# Patient Record
Sex: MALE | Race: WHITE | NOT HISPANIC OR LATINO | Employment: FULL TIME | URBAN - NONMETROPOLITAN AREA
[De-identification: names, ages, dates, MRNs, and addresses within clinical notes are randomized per-mention and may not be internally consistent; named-entity substitution may affect disease eponyms.]

---

## 2019-01-15 ENCOUNTER — OFFICE VISIT (OUTPATIENT)
Dept: RETAIL CLINIC | Facility: CLINIC | Age: 34
End: 2019-01-15

## 2019-01-15 VITALS
SYSTOLIC BLOOD PRESSURE: 123 MMHG | DIASTOLIC BLOOD PRESSURE: 83 MMHG | RESPIRATION RATE: 20 BRPM | WEIGHT: 307 LBS | BODY MASS INDEX: 40.69 KG/M2 | HEIGHT: 73 IN | TEMPERATURE: 98 F | HEART RATE: 90 BPM | OXYGEN SATURATION: 97 %

## 2019-01-15 DIAGNOSIS — R11.0 NAUSEA: Primary | ICD-10-CM

## 2019-01-15 DIAGNOSIS — R19.7 ACUTE DIARRHEA: ICD-10-CM

## 2019-01-15 PROBLEM — E11.9 TYPE 2 DIABETES MELLITUS WITHOUT COMPLICATION, WITHOUT LONG-TERM CURRENT USE OF INSULIN: Status: ACTIVE | Noted: 2019-01-15

## 2019-01-15 PROBLEM — I10 ESSENTIAL HYPERTENSION: Status: ACTIVE | Noted: 2019-01-15

## 2019-01-15 PROCEDURE — 99203 OFFICE O/P NEW LOW 30 MIN: CPT | Performed by: NURSE PRACTITIONER

## 2019-01-15 RX ORDER — HYDROCHLOROTHIAZIDE 25 MG/1
25 TABLET ORAL DAILY
COMMUNITY
End: 2019-02-21 | Stop reason: SDUPTHER

## 2019-01-15 RX ORDER — ONDANSETRON 8 MG/1
8 TABLET, ORALLY DISINTEGRATING ORAL EVERY 8 HOURS PRN
Qty: 12 TABLET | Refills: 0 | Status: SHIPPED | OUTPATIENT
Start: 2019-01-15 | End: 2019-07-18

## 2019-01-15 RX ORDER — AMLODIPINE BESYLATE 5 MG/1
5 TABLET ORAL DAILY
COMMUNITY
End: 2019-03-21 | Stop reason: DRUGHIGH

## 2019-01-15 RX ORDER — LOSARTAN POTASSIUM 50 MG/1
50 TABLET ORAL DAILY
COMMUNITY
End: 2019-02-21 | Stop reason: SDUPTHER

## 2019-01-15 NOTE — PATIENT INSTRUCTIONS
Diarrhea, Adult  Diarrhea is frequent loose and watery bowel movements. Diarrhea can make you feel weak and cause you to become dehydrated. Dehydration can make you tired and thirsty, cause you to have a dry mouth, and decrease how often you urinate. Diarrhea typically lasts 2-3 days. However, it can last longer if it is a sign of something more serious. It is important to treat your diarrhea as told by your health care provider.  Follow these instructions at home:  Eating and drinking    Follow these recommendations as told by your health care provider:  · Take an oral rehydration solution (ORS). This is a drink that is sold at pharmacies and retail stores.  · Drink clear fluids, such as water, ice chips, diluted fruit juice, and low-calorie sports drinks.  · Eat bland, easy-to-digest foods in small amounts as you are able. These foods include bananas, applesauce, rice, lean meats, toast, and crackers.  · Avoid drinking fluids that contain a lot of sugar or caffeine, such as energy drinks, sports drinks, and soda.  · Avoid alcohol.  · Avoid spicy or fatty foods.    General instructions  · Drink enough fluid to keep your urine clear or pale yellow.  · Wash your hands often. If soap and water are not available, use hand .  · Make sure that all people in your household wash their hands well and often.  · Take over-the-counter and prescription medicines only as told by your health care provider.  · Rest at home while you recover.  · Watch your condition for any changes.  · Take a warm bath to relieve any burning or pain from frequent diarrhea episodes.  · Keep all follow-up visits as told by your health care provider. This is important.  Contact a health care provider if:  · You have a fever.  · Your diarrhea gets worse.  · You have new symptoms.  · You cannot keep fluids down.  · You feel light-headed or dizzy.  · You have a headache  · You have muscle cramps.  Get help right away if:  · You have chest  pain.  · You feel extremely weak or you faint.  · You have bloody or black stools or stools that look like tar.  · You have severe pain, cramping, or bloating in your abdomen.  · You have trouble breathing or you are breathing very quickly.  · Your heart is beating very quickly.  · Your skin feels cold and clammy.  · You feel confused.  · You have signs of dehydration, such as:  ? Dark urine, very little urine, or no urine.  ? Cracked lips.  ? Dry mouth.  ? Sunken eyes.  ? Sleepiness.  ? Weakness.  This information is not intended to replace advice given to you by your health care provider. Make sure you discuss any questions you have with your health care provider.  Document Released: 12/08/2003 Document Revised: 04/27/2017 Document Reviewed: 08/23/2016  [employer] Patient Education © 2018 [employer]  Nausea, Adult  Nausea is the feeling of an upset stomach or having to vomit. Nausea on its own is not usually a serious concern, but it may be an early sign of a more serious medical problem. As nausea gets worse, it can lead to vomiting. If vomiting develops, or if you are not able to drink enough fluids, you are at risk of becoming dehydrated. Dehydration can make you tired and thirsty, cause you to have a dry mouth, and decrease how often you urinate. Older adults and people with other diseases or a weak immune system are at higher risk for dehydration. The main goals of treating your nausea are:  · To limit repeated nausea episodes.  · To prevent vomiting and dehydration.    Follow these instructions at home:  Follow instructions from your health care provider about how to care for yourself at home.  Eating and drinking  Follow these recommendations as told by your health care provider:  · Take an oral rehydration solution (ORS). This is a drink that is sold at pharmacies and retail stores.  · Drink clear fluids in small amounts as you are able. Clear fluids include water, ice chips, diluted fruit  juice, and low-calorie sports drinks.  · Eat bland, easy-to-digest foods in small amounts as you are able. These foods include bananas, applesauce, rice, lean meats, toast, and crackers.  · Avoid drinking fluids that contain a lot of sugar or caffeine, such as energy drinks, sports drinks, and soda.  · Avoid alcohol.  · Avoid spicy or fatty foods.    General instructions  · Drink enough fluid to keep your urine clear or pale yellow.  · Wash your hands often. If soap and water are not available, use hand .  · Make sure that all people in your household wash their hands well and often.  · Rest at home while you recover.  · Take over-the-counter and prescription medicines only as told by your health care provider.  · Breathe slowly and deeply when you feel nauseous.  · Watch your condition for any changes.  · Keep all follow-up visits as told by your health care provider. This is important.  Contact a health care provider if:  · You have a headache.  · You have new symptoms.  · Your nausea gets worse.  · You have a fever.  · You feel light-headed or dizzy.  · You vomit.  · You cannot keep fluids down.  Get help right away if:  · You have pain in your chest, neck, arm, or jaw.  · You feel extremely weak or you faint.  · You have vomit that is bright red or looks like coffee grounds.  · You have bloody or black stools or stools that look like tar.  · You have a severe headache, a stiff neck, or both.  · You have severe pain, cramping, or bloating in your abdomen.  · You have a rash.  · You have difficulty breathing or are breathing very quickly.  · Your heart is beating very quickly.  · Your skin feels cold and clammy.  · You feel confused.  · You have pain when you urinate.  · You have signs of dehydration, such as:  ? Dark urine, very little, or no urine.  ? Cracked lips.  ? Dry mouth.  ? Sunken eyes.  ? Sleepiness.  ? Weakness.  These symptoms may represent a serious problem that is an emergency. Do not wait  to see if the symptoms will go away. Get medical help right away. Call your local emergency services (911 in the [place] Do not drive yourself to the hospital.  This information is not intended to replace advice given to you by your health care provider. Make sure you discuss any questions you have with your health care provider.  Document Released: 01/25/2006 Document Revised: 05/22/2017 Document Reviewed: 08/23/2016  [employer] Interactive Patient Education © 2018 [employer]

## 2019-02-15 ENCOUNTER — OFFICE VISIT (OUTPATIENT)
Dept: INTERNAL MEDICINE | Facility: CLINIC | Age: 34
End: 2019-02-15

## 2019-02-15 VITALS
WEIGHT: 306.44 LBS | BODY MASS INDEX: 40.61 KG/M2 | OXYGEN SATURATION: 98 % | SYSTOLIC BLOOD PRESSURE: 144 MMHG | DIASTOLIC BLOOD PRESSURE: 92 MMHG | HEIGHT: 73 IN | RESPIRATION RATE: 12 BRPM | TEMPERATURE: 98.4 F | HEART RATE: 90 BPM

## 2019-02-15 DIAGNOSIS — K21.9 GASTROESOPHAGEAL REFLUX DISEASE, ESOPHAGITIS PRESENCE NOT SPECIFIED: ICD-10-CM

## 2019-02-15 DIAGNOSIS — I10 ESSENTIAL HYPERTENSION: ICD-10-CM

## 2019-02-15 DIAGNOSIS — E11.9 TYPE 2 DIABETES MELLITUS WITHOUT COMPLICATION, WITHOUT LONG-TERM CURRENT USE OF INSULIN (HCC): Primary | ICD-10-CM

## 2019-02-15 PROCEDURE — 99204 OFFICE O/P NEW MOD 45 MIN: CPT | Performed by: FAMILY MEDICINE

## 2019-02-15 RX ORDER — METFORMIN HYDROCHLORIDE 500 MG/1
1 TABLET, EXTENDED RELEASE ORAL 2 TIMES DAILY
COMMUNITY
Start: 2019-01-16 | End: 2019-03-21 | Stop reason: SDUPTHER

## 2019-02-15 NOTE — PATIENT INSTRUCTIONS
Check milligram doses on bottles before you needs refills.     Please get labs done soon.     Goal blood pressure less than 140/90, if that is not happening see us back for medication adjustment.     See us back in 3 months, sooner if labs or blood pressure is abnormal.

## 2019-02-15 NOTE — PROGRESS NOTES
"CC:   Chief Complaint   Patient presents with   • Establish Care   • Med Refill       History:  Gagandeep Ordoñez is a 33 y.o. male who presents today for evaluation of the above problems.      Constant muscle cramps that wake him up at night, on feet all day working at [employer] center. Works night shift. Present for several months, maybe a year. Last episode last night, prior to that 2 weeks ago.     HTN: on amlodipine, unsure what dose, as well as HCTZ and losartan. He does not know the dosage of any of them. BP normally runs 120-130/80s.     DMII: has not checked his blood sugars recently. Cannot remember last A1c. Says that he has never been a diabetic, just \"gave him medication.\" Saw dietician once, tries to avoid breads, no sodas, drinks mostly water.     Gets acid reflux on occasions with spicy foods.     Wife thinks he has sleep apnea due to fatigue.     ROS:  Review of Systems   Constitutional: Positive for fatigue (12 hr days). Negative for chills and fever.   HENT: Negative.    Eyes: Negative.    Respiratory: Negative.    Cardiovascular: Negative.    Gastrointestinal: Negative.    Endocrine: Positive for polydipsia.   Genitourinary: Negative.    Musculoskeletal: Positive for myalgias.   Allergic/Immunologic: Negative.    Neurological: Negative.    Hematological: Negative.    Psychiatric/Behavioral: Positive for sleep disturbance.       No Known Allergies  Past Medical History:   Diagnosis Date   • Acid reflux    • Hypertension    • Type 2 diabetes mellitus (CMS/HCC)      Past Surgical History:   Procedure Laterality Date   • HAND SURGERY     • HERNIA REPAIR       Family History   Problem Relation Age of Onset   • Diabetes Mother    • Diabetes Father    • Stroke Father       reports that  has never smoked. He has quit using smokeless tobacco. He reports that he drinks alcohol. He reports that he does not use drugs.      Current Outpatient Medications:   •  amLODIPine (NORVASC) 5 MG tablet, Take 5 mg by mouth " "Daily., Disp: , Rfl:   •  hydrochlorothiazide (HYDRODIURIL) 25 MG tablet, Take 25 mg by mouth Daily., Disp: , Rfl:   •  losartan (COZAAR) 50 MG tablet, Take 50 mg by mouth Daily., Disp: , Rfl:   •  metFORMIN ER (GLUCOPHAGE-XR) 500 MG 24 hr tablet, Take 1 tablet by mouth 2 (Two) Times a Day., Disp: , Rfl:   •  ondansetron ODT (ZOFRAN ODT) 8 MG disintegrating tablet, Take 1 tablet by mouth Every 8 (Eight) Hours As Needed for Nausea or Vomiting., Disp: 12 tablet, Rfl: 0    OBJECTIVE:  /92 (BP Location: Left arm, Patient Position: Sitting, Cuff Size: Adult)   Pulse 90   Temp 98.4 °F (36.9 °C) (Temporal)   Resp 12   Ht 185.4 cm (73\")   Wt (!) 139 kg (306 lb 7 oz)   SpO2 98%   BMI 40.43 kg/m²      Physical Exam   Constitutional: He is oriented to person, place, and time. No distress.   Morbidly obese   HENT:   Head: Normocephalic and atraumatic.   Nose: Nose normal.   Eyes: Conjunctivae are normal. Right eye exhibits no discharge. Left eye exhibits no discharge. No scleral icterus.   Neck: No tracheal deviation present.   Cardiovascular: Normal rate, regular rhythm and normal heart sounds. Exam reveals no gallop and no friction rub.   No murmur heard.  Pulmonary/Chest: Effort normal and breath sounds normal. No respiratory distress. He has no wheezes. He has no rales.   Musculoskeletal: He exhibits no edema.   Neurological: He is alert and oriented to person, place, and time.   Skin: Skin is warm and dry. He is not diaphoretic. No pallor.   Psychiatric: He has a normal mood and affect. His behavior is normal. Judgment and thought content normal.   Nursing note and vitals reviewed.      Assessment/Plan    Problem List Items Addressed This Visit        Cardiovascular and Mediastinum    Essential hypertension     Elevated today  -home BP measurements  -clarify home doses of medications prior to refill            Digestive    Acid reflux     PRN OTC antacid            Endocrine    Type 2 diabetes mellitus without " complication, without long-term current use of insulin (CMS/[facility]) - Primary     Check A1c and CMP today, gave Rx for pt to check blood sugars. F/u 3 months, sooner if A1c elevated for additional therapy         Relevant Medications    metFORMIN ER (GLUCOPHAGE-XR) 500 MG 24 hr tablet    Other Relevant Orders    Lipid panel    Comprehensive Metabolic Panel    Hemoglobin A1c    TSH          Patient's Body mass index is 40.43 kg/m². BMI is above normal parameters. Recommendations include: nutrition counseling.      An After Visit Summary was printed and given to the patient at discharge.  Return in about 3 months (around 5/15/2019).         Mynor Walker D.O.  Family Medicine  Osteopathic Neuromusculoskeletal Medicine  2/17/2019

## 2019-02-18 NOTE — ASSESSMENT & PLAN NOTE
Check A1c and CMP today, gave Rx for pt to check blood sugars. F/u 3 months, sooner if A1c elevated for additional therapy

## 2019-02-20 ENCOUNTER — APPOINTMENT (OUTPATIENT)
Dept: CT IMAGING | Age: 34
End: 2019-02-20
Payer: COMMERCIAL

## 2019-02-20 ENCOUNTER — HOSPITAL ENCOUNTER (EMERGENCY)
Age: 34
Discharge: HOME | End: 2019-02-20
Payer: COMMERCIAL

## 2019-02-20 VITALS
HEART RATE: 84 BPM | HEIGHT: 72 IN | TEMPERATURE: 97.8 F | WEIGHT: 306 LBS | OXYGEN SATURATION: 93 % | RESPIRATION RATE: 16 BRPM | SYSTOLIC BLOOD PRESSURE: 118 MMHG | BODY MASS INDEX: 41.45 KG/M2 | DIASTOLIC BLOOD PRESSURE: 64 MMHG

## 2019-02-20 DIAGNOSIS — I16.0 HYPERTENSIVE URGENCY: Primary | ICD-10-CM

## 2019-02-20 LAB
ALBUMIN SERPL-MCNC: 4.5 G/DL (ref 3.5–5.2)
ALP BLD-CCNC: 90 U/L (ref 40–130)
ALT SERPL-CCNC: 44 U/L (ref 5–41)
ANION GAP SERPL CALCULATED.3IONS-SCNC: 14 MMOL/L (ref 7–19)
AST SERPL-CCNC: 25 U/L (ref 5–40)
BASOPHILS ABSOLUTE: 0.1 K/UL (ref 0–0.2)
BASOPHILS RELATIVE PERCENT: 0.7 % (ref 0–1)
BILIRUB SERPL-MCNC: 0.4 MG/DL (ref 0.2–1.2)
BUN BLDV-MCNC: 18 MG/DL (ref 6–20)
CALCIUM SERPL-MCNC: 9.5 MG/DL (ref 8.6–10)
CHLORIDE BLD-SCNC: 97 MMOL/L (ref 98–111)
CO2: 27 MMOL/L (ref 22–29)
CREAT SERPL-MCNC: 0.8 MG/DL (ref 0.5–1.2)
EOSINOPHILS ABSOLUTE: 0.1 K/UL (ref 0–0.6)
EOSINOPHILS RELATIVE PERCENT: 1.4 % (ref 0–5)
GFR NON-AFRICAN AMERICAN: >60
GLUCOSE BLD-MCNC: 142 MG/DL (ref 74–109)
HCT VFR BLD CALC: 45.6 % (ref 42–52)
HEMOGLOBIN: 15.1 G/DL (ref 14–18)
LYMPHOCYTES ABSOLUTE: 3.4 K/UL (ref 1.1–4.5)
LYMPHOCYTES RELATIVE PERCENT: 33.2 % (ref 20–40)
MCH RBC QN AUTO: 28.8 PG (ref 27–31)
MCHC RBC AUTO-ENTMCNC: 33.1 G/DL (ref 33–37)
MCV RBC AUTO: 86.9 FL (ref 80–94)
MONOCYTES ABSOLUTE: 0.8 K/UL (ref 0–0.9)
MONOCYTES RELATIVE PERCENT: 7.9 % (ref 0–10)
NEUTROPHILS ABSOLUTE: 5.8 K/UL (ref 1.5–7.5)
NEUTROPHILS RELATIVE PERCENT: 56.4 % (ref 50–65)
PDW BLD-RTO: 12.9 % (ref 11.5–14.5)
PLATELET # BLD: 292 K/UL (ref 130–400)
PMV BLD AUTO: 11.2 FL (ref 9.4–12.4)
POTASSIUM SERPL-SCNC: 4.2 MMOL/L (ref 3.5–5)
RBC # BLD: 5.25 M/UL (ref 4.7–6.1)
SODIUM BLD-SCNC: 138 MMOL/L (ref 136–145)
TOTAL PROTEIN: 7.1 G/DL (ref 6.6–8.7)
TROPONIN: <0.01 NG/ML (ref 0–0.03)
WBC # BLD: 10.3 K/UL (ref 4.8–10.8)

## 2019-02-20 PROCEDURE — 84484 ASSAY OF TROPONIN QUANT: CPT

## 2019-02-20 PROCEDURE — 93005 ELECTROCARDIOGRAM TRACING: CPT

## 2019-02-20 PROCEDURE — 99284 EMERGENCY DEPT VISIT MOD MDM: CPT

## 2019-02-20 PROCEDURE — 36415 COLL VENOUS BLD VENIPUNCTURE: CPT

## 2019-02-20 PROCEDURE — 70450 CT HEAD/BRAIN W/O DYE: CPT

## 2019-02-20 PROCEDURE — 99284 EMERGENCY DEPT VISIT MOD MDM: CPT | Performed by: NURSE PRACTITIONER

## 2019-02-20 PROCEDURE — 80053 COMPREHEN METABOLIC PANEL: CPT

## 2019-02-20 PROCEDURE — 85025 COMPLETE CBC W/AUTO DIFF WBC: CPT

## 2019-02-20 RX ORDER — AMLODIPINE BESYLATE 5 MG/1
5 TABLET ORAL DAILY
COMMUNITY

## 2019-02-20 RX ORDER — LOSARTAN POTASSIUM 50 MG/1
50 TABLET ORAL DAILY
COMMUNITY

## 2019-02-20 RX ORDER — HYDROCHLOROTHIAZIDE 25 MG/1
25 TABLET ORAL DAILY
COMMUNITY

## 2019-02-20 ASSESSMENT — ENCOUNTER SYMPTOMS
RESPIRATORY NEGATIVE: 1
NAUSEA: 1
DIARRHEA: 1

## 2019-02-21 ENCOUNTER — LAB (OUTPATIENT)
Dept: LAB | Facility: HOSPITAL | Age: 34
End: 2019-02-21

## 2019-02-21 ENCOUNTER — OFFICE VISIT (OUTPATIENT)
Dept: INTERNAL MEDICINE | Facility: CLINIC | Age: 34
End: 2019-02-21

## 2019-02-21 VITALS
HEIGHT: 73 IN | SYSTOLIC BLOOD PRESSURE: 122 MMHG | OXYGEN SATURATION: 98 % | HEART RATE: 84 BPM | WEIGHT: 309.13 LBS | BODY MASS INDEX: 40.97 KG/M2 | DIASTOLIC BLOOD PRESSURE: 80 MMHG | RESPIRATION RATE: 12 BRPM

## 2019-02-21 DIAGNOSIS — I10 ESSENTIAL HYPERTENSION: Primary | ICD-10-CM

## 2019-02-21 DIAGNOSIS — G47.26 SHIFT WORK SLEEP DISORDER: ICD-10-CM

## 2019-02-21 DIAGNOSIS — Z79.4 DIABETES MELLITUS, TYPE II, INSULIN DEPENDENT (HCC): ICD-10-CM

## 2019-02-21 DIAGNOSIS — E11.9 TYPE 2 DIABETES MELLITUS WITHOUT COMPLICATION, WITHOUT LONG-TERM CURRENT USE OF INSULIN (HCC): ICD-10-CM

## 2019-02-21 DIAGNOSIS — E11.9 DIABETES MELLITUS, TYPE II, INSULIN DEPENDENT (HCC): ICD-10-CM

## 2019-02-21 LAB
ALBUMIN SERPL-MCNC: 4.5 G/DL (ref 3.5–5)
ALBUMIN/GLOB SERPL: 1.3 G/DL (ref 1.1–2.5)
ALP SERPL-CCNC: 81 U/L (ref 24–120)
ALT SERPL W P-5'-P-CCNC: 64 U/L (ref 0–54)
ANION GAP SERPL CALCULATED.3IONS-SCNC: 9 MMOL/L (ref 4–13)
AST SERPL-CCNC: 42 U/L (ref 7–45)
BILIRUB SERPL-MCNC: 0.8 MG/DL (ref 0.1–1)
BUN BLD-MCNC: 16 MG/DL (ref 5–21)
BUN/CREAT SERPL: 20 (ref 7–25)
CALCIUM SPEC-SCNC: 9.5 MG/DL (ref 8.4–10.4)
CHLORIDE SERPL-SCNC: 99 MMOL/L (ref 98–110)
CO2 SERPL-SCNC: 30 MMOL/L (ref 24–31)
CREAT BLD-MCNC: 0.8 MG/DL (ref 0.5–1.4)
GFR SERPL CREATININE-BSD FRML MDRD: 110 ML/MIN/1.73
GLOBULIN UR ELPH-MCNC: 3.5 GM/DL
GLUCOSE BLD-MCNC: 125 MG/DL (ref 70–100)
HBA1C MFR BLD: 6.6 %
POTASSIUM BLD-SCNC: 4.3 MMOL/L (ref 3.5–5.3)
PROT SERPL-MCNC: 8 G/DL (ref 6.3–8.7)
SODIUM BLD-SCNC: 138 MMOL/L (ref 135–145)
TSH SERPL DL<=0.05 MIU/L-ACNC: 1.39 MIU/ML (ref 0.47–4.68)

## 2019-02-21 PROCEDURE — 83036 HEMOGLOBIN GLYCOSYLATED A1C: CPT

## 2019-02-21 PROCEDURE — 84443 ASSAY THYROID STIM HORMONE: CPT | Performed by: FAMILY MEDICINE

## 2019-02-21 PROCEDURE — 36415 COLL VENOUS BLD VENIPUNCTURE: CPT

## 2019-02-21 PROCEDURE — 99214 OFFICE O/P EST MOD 30 MIN: CPT | Performed by: FAMILY MEDICINE

## 2019-02-21 PROCEDURE — 80053 COMPREHEN METABOLIC PANEL: CPT | Performed by: FAMILY MEDICINE

## 2019-02-21 RX ORDER — HYDROCHLOROTHIAZIDE 25 MG/1
25 TABLET ORAL DAILY
Qty: 90 TABLET | Refills: 1 | Status: SHIPPED | OUTPATIENT
Start: 2019-02-21 | End: 2019-03-21 | Stop reason: SDUPTHER

## 2019-02-21 RX ORDER — LOSARTAN POTASSIUM 50 MG/1
50 TABLET ORAL DAILY
Qty: 90 TABLET | Refills: 1 | Status: SHIPPED | OUTPATIENT
Start: 2019-02-21 | End: 2019-03-21 | Stop reason: SDUPTHER

## 2019-02-21 RX ORDER — AMLODIPINE BESYLATE 10 MG/1
10 TABLET ORAL DAILY
Qty: 90 TABLET | Refills: 0 | Status: SHIPPED | OUTPATIENT
Start: 2019-02-21 | End: 2019-03-21 | Stop reason: SDUPTHER

## 2019-02-21 NOTE — PATIENT INSTRUCTIONS
Continue losartan and HCTZ  Increase amlodipine to 10 mg daily (new Rx is 10 mg tabs, but you can take two 5 mg tabs until it's out)  Take home blood pressure goal of less than 140/90, use a arm cuff as wrist cuffs are often inaccurate.     Try melatonin to help with sleep before using Tylenol PM

## 2019-02-21 NOTE — ASSESSMENT & PLAN NOTE
Uncontrolled with hypertensive urgency yesterday. BP ok today, but given yesterday's events will increase amlodipine and continue HCTZ and losartan. F/u 4 weeks, home BP monitoring for goal < 140/90.

## 2019-02-21 NOTE — PROGRESS NOTES
CC:   Chief Complaint   Patient presents with   • Follow-up   • Hypertension       History:  Gagandeep Ordoñez is a 33 y.o. male who presents today for follow-up for evaluation of the above:    Had high blood pressure yesterday AM was 182/110 with wrist cuff, took meds and came down to 140s/70s. Says he went to [facility] ED. Had ECG and blood work and CT head (normal) and said that everything was ok. Says he felt numbness in his shoulder and L neck, but says that he pulled a muscle in the arm and it felt similar.     Struggles with staying asleep working at night. Currently not using sleep aid.     ROS:  Review of Systems   Constitutional: Negative for fatigue.   HENT:        Tightness in jaw yesterday with high BP   Respiratory: Negative for shortness of breath.    Cardiovascular: Negative for chest pain.   Gastrointestinal: Negative for nausea.   Genitourinary: Negative.    Neurological: Positive for dizziness (yesterday with high BP). Negative for light-headedness.   Psychiatric/Behavioral: Negative for sleep disturbance.       Mr. Ordoñez  reports that  has never smoked. He has quit using smokeless tobacco. He reports that he drinks alcohol. He reports that he does not use drugs.      Current Outpatient Medications:   •  amLODIPine (NORVASC) 5 MG tablet, Take 5 mg by mouth Daily., Disp: , Rfl:   •  hydrochlorothiazide (HYDRODIURIL) 25 MG tablet, Take 1 tablet by mouth Daily., Disp: 90 tablet, Rfl: 1  •  losartan (COZAAR) 50 MG tablet, Take 1 tablet by mouth Daily., Disp: 90 tablet, Rfl: 1  •  metFORMIN ER (GLUCOPHAGE-XR) 500 MG 24 hr tablet, Take 1 tablet by mouth 2 (Two) Times a Day., Disp: , Rfl:   •  amLODIPine (NORVASC) 10 MG tablet, Take 1 tablet by mouth Daily., Disp: 90 tablet, Rfl: 0  •  ondansetron ODT (ZOFRAN ODT) 8 MG disintegrating tablet, Take 1 tablet by mouth Every 8 (Eight) Hours As Needed for Nausea or Vomiting., Disp: 12 tablet, Rfl: 0      OBJECTIVE:  /80 (BP Location: Left arm, Patient Position:  "Sitting, Cuff Size: Large Adult)   Pulse 84   Resp 12   Ht 185.4 cm (73\")   Wt (!) 140 kg (309 lb 2 oz)   SpO2 98%   BMI 40.78 kg/m²    Physical Exam   Constitutional: He is oriented to person, place, and time. No distress.   Morbid obesity   HENT:   Head: Normocephalic and atraumatic.   Nose: Nose normal.   Eyes: Conjunctivae are normal. Right eye exhibits no discharge. Left eye exhibits no discharge. No scleral icterus.   Neck: No tracheal deviation present.   Cardiovascular: Normal rate, regular rhythm and normal heart sounds. Exam reveals no gallop and no friction rub.   No murmur heard.  Pulmonary/Chest: Effort normal and breath sounds normal. No respiratory distress. He has no wheezes. He has no rales.   Neurological: He is alert and oriented to person, place, and time.   Skin: Skin is warm and dry. He is not diaphoretic. No pallor.   Psychiatric: He has a normal mood and affect. His behavior is normal. Judgment and thought content normal.   Nursing note and vitals reviewed.      Assessment/Plan    Problem List Items Addressed This Visit        Cardiovascular and Mediastinum    Essential hypertension - Primary     Uncontrolled with hypertensive urgency yesterday. BP ok today, but given yesterday's events will increase amlodipine and continue HCTZ and losartan. F/u 4 weeks, home BP monitoring for goal < 140/90.          Relevant Medications    amLODIPine (NORVASC) 10 MG tablet    hydrochlorothiazide (HYDRODIURIL) 25 MG tablet    losartan (COZAAR) 50 MG tablet    Other Relevant Orders    Miscellaneous DME       Other    Shift work sleep disorder     Recommend melatonin               An After Visit Summary was printed and given to the patient at discharge.  Return in about 4 weeks (around 3/21/2019). Sooner if problems arise.         Mynor Walker D.O.  Family Medicine  Osteopathic Neuromusculoskeletal Medicine  "

## 2019-02-22 LAB
EKG P AXIS: -4 DEGREES
EKG P-R INTERVAL: 152 MS
EKG Q-T INTERVAL: 376 MS
EKG QRS DURATION: 92 MS
EKG QTC CALCULATION (BAZETT): 398 MS
EKG T AXIS: -2 DEGREES

## 2019-02-25 ENCOUNTER — TELEPHONE (OUTPATIENT)
Dept: INTERNAL MEDICINE | Facility: CLINIC | Age: 34
End: 2019-02-25

## 2019-02-25 NOTE — TELEPHONE ENCOUNTER
----- Message from Mynor Walker DO sent at 2/25/2019  4:25 PM CST -----  Normal TSH  A1c looks great  Mild liver enzyme elevation that can be monitored for now, recommend weight loss    Please inform pt

## 2019-03-14 ENCOUNTER — TELEPHONE (OUTPATIENT)
Dept: INTERNAL MEDICINE | Facility: CLINIC | Age: 34
End: 2019-03-14

## 2019-03-14 ENCOUNTER — TRANSCRIBE ORDERS (OUTPATIENT)
Dept: INTERNAL MEDICINE | Facility: CLINIC | Age: 34
End: 2019-03-14

## 2019-03-14 ENCOUNTER — APPOINTMENT (OUTPATIENT)
Dept: LAB | Facility: HOSPITAL | Age: 34
End: 2019-03-14

## 2019-03-14 DIAGNOSIS — E11.9 DIABETES MELLITUS, TYPE II, INSULIN DEPENDENT (HCC): Primary | ICD-10-CM

## 2019-03-14 DIAGNOSIS — Z79.4 DIABETES MELLITUS, TYPE II, INSULIN DEPENDENT (HCC): Primary | ICD-10-CM

## 2019-03-14 LAB
CHOLEST SERPL-MCNC: 146 MG/DL (ref 130–200)
HDLC SERPL-MCNC: 39 MG/DL
LDLC SERPL CALC-MCNC: 42 MG/DL (ref 0–99)
LDLC/HDLC SERPL: 1.09 {RATIO}
TRIGL SERPL-MCNC: 323 MG/DL (ref 0–149)
VLDLC SERPL-MCNC: 64.6 MG/DL

## 2019-03-14 PROCEDURE — 80061 LIPID PANEL: CPT

## 2019-03-14 PROCEDURE — 36415 COLL VENOUS BLD VENIPUNCTURE: CPT

## 2019-03-14 NOTE — TELEPHONE ENCOUNTER
Called patient gave results and need for low fat diet        ----- Message from Mynor Walker DO sent at 3/14/2019 12:28 PM CDT -----  Hypertriglyceridemia, recommend low-fat diet, please inform patient

## 2019-03-15 NOTE — TELEPHONE ENCOUNTER
Please tell the patient that I would recommend starting at the American diabetes Association website for further information.  We can talk more at our next follow-up visit

## 2019-03-21 ENCOUNTER — OFFICE VISIT (OUTPATIENT)
Dept: INTERNAL MEDICINE | Facility: CLINIC | Age: 34
End: 2019-03-21

## 2019-03-21 VITALS
BODY MASS INDEX: 41.27 KG/M2 | WEIGHT: 311.38 LBS | HEIGHT: 73 IN | RESPIRATION RATE: 12 BRPM | OXYGEN SATURATION: 98 % | SYSTOLIC BLOOD PRESSURE: 130 MMHG | HEART RATE: 82 BPM | DIASTOLIC BLOOD PRESSURE: 92 MMHG

## 2019-03-21 DIAGNOSIS — E11.9 TYPE 2 DIABETES MELLITUS WITHOUT COMPLICATION, WITHOUT LONG-TERM CURRENT USE OF INSULIN (HCC): Primary | ICD-10-CM

## 2019-03-21 DIAGNOSIS — Z56.6 STRESS AT WORK: ICD-10-CM

## 2019-03-21 DIAGNOSIS — E66.01 MORBID OBESITY WITH BMI OF 40.0-44.9, ADULT (HCC): ICD-10-CM

## 2019-03-21 DIAGNOSIS — G47.26 SHIFT WORK SLEEP DISORDER: ICD-10-CM

## 2019-03-21 DIAGNOSIS — I10 ESSENTIAL HYPERTENSION: ICD-10-CM

## 2019-03-21 PROCEDURE — 99214 OFFICE O/P EST MOD 30 MIN: CPT | Performed by: FAMILY MEDICINE

## 2019-03-21 RX ORDER — AMLODIPINE BESYLATE 10 MG/1
10 TABLET ORAL DAILY
Qty: 90 TABLET | Refills: 1 | Status: SHIPPED | OUTPATIENT
Start: 2019-03-21 | End: 2019-11-15 | Stop reason: SDUPTHER

## 2019-03-21 RX ORDER — HYDROCHLOROTHIAZIDE 25 MG/1
25 TABLET ORAL DAILY
Qty: 90 TABLET | Refills: 1 | Status: SHIPPED | OUTPATIENT
Start: 2019-03-21 | End: 2019-05-06 | Stop reason: SDUPTHER

## 2019-03-21 RX ORDER — METFORMIN HYDROCHLORIDE 500 MG/1
500 TABLET, EXTENDED RELEASE ORAL 2 TIMES DAILY
Qty: 180 TABLET | Refills: 1 | Status: SHIPPED | OUTPATIENT
Start: 2019-03-21 | End: 2019-09-16 | Stop reason: SDUPTHER

## 2019-03-21 RX ORDER — LOSARTAN POTASSIUM 100 MG/1
100 TABLET ORAL DAILY
Qty: 90 TABLET | Refills: 1 | Status: SHIPPED | OUTPATIENT
Start: 2019-03-21 | End: 2019-09-16 | Stop reason: SDUPTHER

## 2019-03-21 SDOH — HEALTH STABILITY - MENTAL HEALTH: OTHER PHYSICAL AND MENTAL STRAIN RELATED TO WORK: Z56.6

## 2019-03-21 NOTE — PROGRESS NOTES
"CC:   Chief Complaint   Patient presents with   • Follow-up   • Hypertension   • Diabetes       History:  Gagandeep Ordoñez is a 33 y.o. male who presents today for follow-up for evaluation of the above:    Blood pressure usually 140s/80s, but staying under 150 since amlodipine increase. 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 He has started taking a cortisol manager supplement recommended by pharmacist that is helping. Melatonin is helping with sleep.     Needs refill of metformin for stable DM    Wants information on low fat diets given in pt instructions today      ROS:  Review of Systems   Respiratory: Negative for shortness of breath.    Cardiovascular: Negative for chest pain.   Neurological: Negative for dizziness and light-headedness.       Mr. Ordoñez  reports that he has never smoked. He has quit using smokeless tobacco. He reports that he drinks alcohol. He reports that he does not use drugs.      Current Outpatient Medications:   •  amLODIPine (NORVASC) 10 MG tablet, Take 1 tablet by mouth Daily., Disp: 90 tablet, Rfl: 1  •  hydrochlorothiazide (HYDRODIURIL) 25 MG tablet, Take 1 tablet by mouth Daily., Disp: 90 tablet, Rfl: 1  •  losartan (COZAAR) 100 MG tablet, Take 1 tablet by mouth Daily., Disp: 90 tablet, Rfl: 1  •  metFORMIN ER (GLUCOPHAGE-XR) 500 MG 24 hr tablet, Take 1 tablet by mouth 2 (Two) Times a Day., Disp: 180 tablet, Rfl: 1  •  Misc Natural Products (CORTISOL MANAGER PO), Take 3 tablets by mouth Daily., Disp: , Rfl:   •  ondansetron ODT (ZOFRAN ODT) 8 MG disintegrating tablet, Take 1 tablet by mouth Every 8 (Eight) Hours As Needed for Nausea or Vomiting., Disp: 12 tablet, Rfl: 0      OBJECTIVE:  /92 (BP Location: Left arm, Patient Position: Sitting, Cuff Size: Large Adult)   Pulse 82   Resp 12   Ht 185.4 cm (73\")   Wt (!) 141 kg (311 lb 6 oz)   SpO2 98%   BMI 41.08 kg/m²    Physical Exam   Constitutional: He is oriented to person, place, and " time. No distress.   Morbid obesity   HENT:   Head: Normocephalic and atraumatic.   Nose: Nose normal.   Eyes: Conjunctivae are normal. Right eye exhibits no discharge. Left eye exhibits no discharge. No scleral icterus.   Neck: No tracheal deviation present.   Cardiovascular: Normal rate, regular rhythm and normal heart sounds. Exam reveals no gallop and no friction rub.   No murmur heard.  Pulmonary/Chest: Effort normal and breath sounds normal. No respiratory distress. He has no wheezes. He has no rales.   Neurological: He is alert and oriented to person, place, and time.   Skin: Skin is warm and dry. He is not diaphoretic. No pallor.   Psychiatric: He has a normal mood and affect. His behavior is normal. Judgment and thought content normal.   Nursing note and vitals reviewed.      Assessment/Plan    Problem List Items Addressed This Visit        Cardiovascular and Mediastinum    Essential hypertension     Still uncontrolled, increase losartan and continue amlodipine and HCTZ. F/u 4-6 weeks         Relevant Medications    losartan (COZAAR) 100 MG tablet    hydrochlorothiazide (HYDRODIURIL) 25 MG tablet    amLODIPine (NORVASC) 10 MG tablet       Digestive    Morbid obesity with BMI of 40.0-44.9, adult (CMS/[facility])     Diet information given to pt today            Endocrine    Type 2 diabetes mellitus without complication, without long-term current use of insulin (CMS/[facility]) - Primary    Relevant Medications    metFORMIN ER (GLUCOPHAGE-XR) 500 MG 24 hr tablet       Other    Shift work sleep disorder     Improved on melatonin         Stress at work     Declines further assistance, will let us know if that is needed at f/u               Patient's Body mass index is 41.08 kg/m². BMI is above normal parameters. Recommendations include: educational material and nutrition counseling.      An After Visit Summary was printed and given to the patient at discharge.  Return in about 6 weeks (around 5/2/2019) for Recheck. Sooner if  problems arise.         Mynor Walker D.O.  [facility]  [facility]

## 2019-03-21 NOTE — PATIENT INSTRUCTIONS
High-Fiber Diet  Fiber, also called dietary fiber, is a type of carbohydrate found in fruits, vegetables, whole grains, and beans. A high-fiber diet can have many health benefits. Your health care provider may recommend a high-fiber diet to help:  · Prevent constipation. Fiber can make your bowel movements more regular.  · Lower your cholesterol.  · Relieve hemorrhoids, uncomplicated diverticulosis, or irritable bowel syndrome.  · Prevent overeating as part of a weight-loss plan.  · Prevent heart disease, type 2 diabetes, and certain cancers.    What is my plan?  The recommended daily intake of fiber includes:  · 38 grams for men under age 50.  · 30 grams for men over age 50.  · 25 grams for women under age 50.  · 21 grams for women over age 50.    You can get the recommended daily intake of dietary fiber by eating a variety of fruits, vegetables, grains, and beans. Your health care provider may also recommend a fiber supplement if it is not possible to get enough fiber through your diet.  What do I need to know about a high-fiber diet?  · Fiber supplements have not been widely studied for their effectiveness, so it is better to get fiber through food sources.  · Always check the fiber content on the nutrition facts label of any prepackaged food. Look for foods that contain at least 5 grams of fiber per serving.  · Ask your dietitian if you have questions about specific foods that are related to your condition, especially if those foods are not listed in the following section.  · Increase your daily fiber consumption gradually. Increasing your intake of dietary fiber too quickly may cause bloating, cramping, or gas.  · Drink plenty of water. Water helps you to digest fiber.  What foods can I eat?  Grains  Whole-grain breads. Multigrain cereal. Oats and oatmeal. Brown rice. Barley. Bulgur wheat. Millet. Bran muffins. Popcorn. Rye wafer crackers.  Vegetables  Sweet potatoes. Spinach. Kale. Artichokes. Cabbage.  Broccoli. Green peas. Carrots. Squash.  Fruits  Berries. Pears. Apples. Oranges. Avocados. Prunes and raisins. Dried figs.  Meats and Other Protein Sources  Navy, kidney, johnston, and soy beans. Split peas. Lentils. Nuts and seeds.  Dairy  Fiber-fortified yogurt.  Beverages  Fiber-fortified soy milk. Fiber-fortified orange juice.  Other  Fiber bars.  The items listed above may not be a complete list of recommended foods or beverages. Contact your dietitian for more options.  What foods are not recommended?  Grains  White bread. Pasta made with refined flour. White rice.  Vegetables  Fried potatoes. Canned vegetables. Well-cooked vegetables.  Fruits  Fruit juice. Cooked, strained fruit.  Meats and Other Protein Sources  Fatty cuts of meat. Fried poultry or fried fish.  Dairy  Milk. Yogurt. Cream cheese. Sour cream.  Beverages  Soft drinks.  Other  Cakes and pastries. Butter and oils.  The items listed above may not be a complete list of foods and beverages to avoid. Contact your dietitian for more information.  What are some tips for including high-fiber foods in my diet?  · Eat a wide variety of high-fiber foods.  · Make sure that half of all grains consumed each day are whole grains.  · Replace breads and cereals made from refined flour or white flour with whole-grain breads and cereals.  · Replace white rice with brown rice, bulgur wheat, or millet.  · Start the day with a breakfast that is high in fiber, such as a cereal that contains at least 5 grams of fiber per serving.  · Use beans in place of meat in soups, salads, or pasta.  · Eat high-fiber snacks, such as berries, raw vegetables, nuts, or popcorn.  This information is not intended to replace advice given to you by your health care provider. Make sure you discuss any questions you have with your health care provider.  Document Released: 12/18/2006 Document Revised: 05/25/2017 Document Reviewed: 06/02/2015  Elsevier Interactive Patient Education © 2018  "[employer]      DASH Eating Plan  DASH stands for \"Dietary Approaches to Stop Hypertension.\" The DASH eating plan is a healthy eating plan that has been shown to reduce high blood pressure (hypertension). It may also reduce your risk for type 2 diabetes, heart disease, and stroke. The DASH eating plan may also help with weight loss.  What are tips for following this plan?  General guidelines  · Avoid eating more than 2,300 mg (milligrams) of salt (sodium) a day. If you have hypertension, you may need to reduce your sodium intake to 1,500 mg a day.  · Limit alcohol intake to no more than 1 drink a day for nonpregnant women and 2 drinks a day for men. One drink equals 12 oz of beer, 5 oz of wine, or 1½ oz of hard liquor.  · Work with your health care provider to maintain a healthy body weight or to lose weight. Ask what an ideal weight is for you.  · Get at least 30 minutes of exercise that causes your heart to beat faster (aerobic exercise) most days of the week. Activities may include walking, swimming, or biking.  · Work with your health care provider or diet and nutrition specialist (dietitian) to adjust your eating plan to your individual calorie needs.  Reading food labels  · Check food labels for the amount of sodium per serving. Choose foods with less than 5 percent of the Daily Value of sodium. Generally, foods with less than 300 mg of sodium per serving fit into this eating plan.  · To find whole grains, look for the word \"whole\" as the first word in the ingredient list.  Shopping  · Buy products labeled as \"low-sodium\" or \"no salt added.\"  · Buy fresh foods. Avoid canned foods and premade or frozen meals.  Cooking  · Avoid adding salt when cooking. Use salt-free seasonings or herbs instead of table salt or sea salt. Check with your health care provider or pharmacist before using salt substitutes.  · Do not roa foods. Cook foods using healthy methods such as baking, boiling, grilling, and broiling " instead.  · Cook with heart-healthy oils, such as olive, canola, soybean, or sunflower oil.  Meal planning    · Eat a balanced diet that includes:  ? 5 or more servings of fruits and vegetables each day. At each meal, try to fill half of your plate with fruits and vegetables.  ? Up to 6-8 servings of whole grains each day.  ? Less than 6 oz of lean meat, poultry, or fish each day. A 3-oz serving of meat is about the same size as a deck of cards. One egg equals 1 oz.  ? 2 servings of low-fat dairy each day.  ? A serving of nuts, seeds, or beans 5 times each week.  ? Heart-healthy fats. Healthy fats called Omega-3 fatty acids are found in foods such as flaxseeds and coldwater fish, like sardines, salmon, and mackerel.  · Limit how much you eat of the following:  ? Canned or prepackaged foods.  ? Food that is high in trans fat, such as fried foods.  ? Food that is high in saturated fat, such as fatty meat.  ? Sweets, desserts, sugary drinks, and other foods with added sugar.  ? Full-fat dairy products.  · Do not salt foods before eating.  · Try to eat at least 2 vegetarian meals each week.  · Eat more home-cooked food and less restaurant, buffet, and fast food.  · When eating at a restaurant, ask that your food be prepared with less salt or no salt, if possible.  What foods are recommended?  The items listed may not be a complete list. Talk with your dietitian about what dietary choices are best for you.  Grains  Whole-grain or whole-wheat bread. Whole-grain or whole-wheat pasta. Brown rice. Oatmeal. Quinoa. Bulgur. Whole-grain and low-sodium cereals. Fouzia bread. Low-fat, low-sodium crackers. Whole-wheat flour tortillas.  Vegetables  Fresh or frozen vegetables (raw, steamed, roasted, or grilled). Low-sodium or reduced-sodium tomato and vegetable juice. Low-sodium or reduced-sodium tomato sauce and tomato paste. Low-sodium or reduced-sodium canned vegetables.  Fruits  All fresh, dried, or frozen fruit. Canned fruit in  natural juice (without added sugar).  Meat and other protein foods  Skinless chicken or turkey. Ground chicken or turkey. Pork with fat trimmed off. Fish and seafood. Egg whites. Dried beans, peas, or lentils. Unsalted nuts, nut butters, and seeds. Unsalted canned beans. Lean cuts of beef with fat trimmed off. Low-sodium, lean deli meat.  Dairy  Low-fat (1%) or fat-free (skim) milk. Fat-free, low-fat, or reduced-fat cheeses. Nonfat, low-sodium ricotta or cottage cheese. Low-fat or nonfat yogurt. Low-fat, low-sodium cheese.  Fats and oils  Soft margarine without trans fats. Vegetable oil. Low-fat, reduced-fat, or light mayonnaise and salad dressings (reduced-sodium). Canola, safflower, olive, soybean, and sunflower oils. Avocado.  Seasoning and other foods  Herbs. Spices. Seasoning mixes without salt. Unsalted popcorn and pretzels. Fat-free sweets.  What foods are not recommended?  The items listed may not be a complete list. Talk with your dietitian about what dietary choices are best for you.  Grains  Baked goods made with fat, such as croissants, muffins, or some breads. Dry pasta or rice meal packs.  Vegetables  Creamed or fried vegetables. Vegetables in a cheese sauce. Regular canned vegetables (not low-sodium or reduced-sodium). Regular canned tomato sauce and paste (not low-sodium or reduced-sodium). Regular tomato and vegetable juice (not low-sodium or reduced-sodium). Pickles. Olives.  Fruits  Canned fruit in a light or heavy syrup. Fried fruit. Fruit in cream or butter sauce.  Meat and other protein foods  Fatty cuts of meat. Ribs. Fried meat. Hernandez. Sausage. Bologna and other processed lunch meats. Salami. Fatback. Hotdogs. Bratwurst. Salted nuts and seeds. Canned beans with added salt. Canned or smoked fish. Whole eggs or egg yolks. Chicken or turkey with skin.  Dairy  Whole or 2% milk, cream, and half-and-half. Whole or full-fat cream cheese. Whole-fat or sweetened yogurt. Full-fat cheese. Nondairy  creamers. Whipped toppings. Processed cheese and cheese spreads.  Fats and oils  Butter. Stick margarine. Lard. Shortening. Ghee. Hernandez fat. Tropical oils, such as coconut, palm kernel, or palm oil.  Seasoning and other foods  Salted popcorn and pretzels. Onion salt, garlic salt, seasoned salt, table salt, and sea salt. Worcestershire sauce. Tartar sauce. Barbecue sauce. Teriyaki sauce. Soy sauce, including reduced-sodium. Steak sauce. Canned and packaged gravies. Fish sauce. Oyster sauce. Cocktail sauce. Horseradish that you find on the shelf. Ketchup. Mustard. Meat flavorings and tenderizers. Bouillon cubes. Hot sauce and Tabasco sauce. Premade or packaged marinades. Premade or packaged taco seasonings. Relishes. Regular salad dressings.  Where to find more information:  · National Heart, Lung, and Blood [place]: www.nhlbi.nih.gov  · American Heart Association: www.heart.org  Summary  · The DASH eating plan is a healthy eating plan that has been shown to reduce high blood pressure (hypertension). It may also reduce your risk for type 2 diabetes, heart disease, and stroke.  · With the DASH eating plan, you should limit salt (sodium) intake to 2,300 mg a day. If you have hypertension, you may need to reduce your sodium intake to 1,500 mg a day.  · When on the DASH eating plan, aim to eat more fresh fruits and vegetables, whole grains, lean proteins, low-fat dairy, and heart-healthy fats.  · Work with your health care provider or diet and nutrition specialist (dietitian) to adjust your eating plan to your individual calorie needs.  This information is not intended to replace advice given to you by your health care provider. Make sure you discuss any questions you have with your health care provider.  Document Released: 12/06/2012 Document Revised: 12/11/2017 Document Reviewed: 12/11/2017  [employer] Interactive Patient Education © 2019 [employer].      Heart-Healthy Eating Plan  Heart-healthy meal planning  "includes:  · Limiting unhealthy fats.  · Increasing healthy fats.  · Making other small dietary changes.    You may need to talk with your doctor or a diet specialist (dietitian) to create an eating plan that is right for you.  What types of fat should I choose?  · Choose healthy fats. These include olive oil and canola oil, flaxseeds, walnuts, almonds, and seeds.  · Eat more omega-3 fats. These include salmon, mackerel, sardines, tuna, flaxseed oil, and ground flaxseeds. Try to eat fish at least twice each week.  · Limit saturated fats.  ? Saturated fats are often found in animal products, such as meats, butter, and cream.  ? Plant sources of saturated fats include palm oil, palm kernel oil, and coconut oil.  · Avoid foods with partially hydrogenated oils in them. These include stick margarine, some tub margarines, cookies, crackers, and other baked goods. These contain trans fats.  What general guidelines do I need to follow?  · Check food labels carefully. Identify foods with trans fats or high amounts of saturated fat.  · Fill one half of your plate with vegetables and green salads. Eat 4-5 servings of vegetables per day. A serving of vegetables is:  ? 1 cup of raw leafy vegetables.  ? ½ cup of raw or cooked cut-up vegetables.  ? ½ cup of vegetable juice.  · Fill one fourth of your plate with whole grains. Look for the word \"whole\" as the first word in the ingredient list.  · Fill one fourth of your plate with lean protein foods.  · Eat 4-5 servings of fruit per day. A serving of fruit is:  ? One medium whole fruit.  ? ¼ cup of dried fruit.  ? ½ cup of fresh, frozen, or canned fruit.  ? ½ cup of 100% fruit juice.  · Eat more foods that contain soluble fiber. These include apples, broccoli, carrots, beans, peas, and barley. Try to get 20-30 g of fiber per day.  · Eat more home-cooked food. Eat less restaurant, buffet, and fast food.  · Limit or avoid alcohol.  · Limit foods high in starch and sugar.  · Avoid " fried foods.  · Avoid frying your food. Try baking, boiling, grilling, or broiling it instead. You can also reduce fat by:  ? Removing the skin from poultry.  ? Removing all visible fats from meats.  ? Skimming the fat off of stews, soups, and gravies before serving them.  ? Steaming vegetables in water or broth.  · Lose weight if you are overweight.  · Eat 4-5 servings of nuts, legumes, and seeds per week:  ? One serving of dried beans or legumes equals ½ cup after being cooked.  ? One serving of nuts equals 1½ ounces.  ? One serving of seeds equals ½ ounce or one tablespoon.  · You may need to keep track of how much salt or sodium you eat. This is especially true if you have high blood pressure. Talk with your doctor or dietitian to get more information.  What foods can I eat?  Grains  Breads, including Kazakh, white, erasmo, wheat, raisin, rye, oatmeal, and Italian. Tortillas that are neither fried nor made with lard or trans fat. Low-fat rolls, including hotdog and hamburger buns and English muffins. Biscuits. Muffins. Waffles. Pancakes. Light popcorn. Whole-grain cereals. Flatbread. [place] toast. Pretzels. Breadsticks. Rusks. Low-fat snacks. Low-fat crackers, including oyster, saltine, matzo, jorge, animal, and rye. Rice and pasta, including brown rice and pastas that are made with whole wheat.  Vegetables  All vegetables.  Fruits  All fruits, but limit coconut.  Meats and Other Protein Sources  Lean, well-trimmed beef, veal, pork, and lamb. Chicken and turkey without skin. All fish and shellfish. Wild duck, rabbit, pheasant, and venison. Egg whites or low-cholesterol egg substitutes. Dried beans, peas, lentils, and tofu. Seeds and most nuts.  Dairy  Low-fat or nonfat cheeses, including ricotta, string, and mozzarella. Skim or 1% milk that is liquid, powdered, or evaporated. Buttermilk that is made with low-fat milk. Nonfat or low-fat yogurt.  Beverages  Mineral water. Diet carbonated beverages.  Sweets and  Desserts  Sherbets and fruit ices. Honey, jam, marmalade, jelly, and syrups. Meringues and gelatins. Pure sugar candy, such as hard candy, jelly beans, gumdrops, mints, marshmallows, and small amounts of dark chocolate. Paul food cake.  Eat all sweets and desserts in moderation.  Fats and Oils  Nonhydrogenated (trans-free) margarines. Vegetable oils, including soybean, sesame, sunflower, olive, peanut, safflower, corn, canola, and cottonseed. Salad dressings or mayonnaise made with a vegetable oil. Limit added fats and oils that you use for cooking, baking, salads, and as spreads.  Other  Cocoa powder. Coffee and tea. All seasonings and condiments.  The items listed above may not be a complete list of recommended foods or beverages. Contact your dietitian for more options.  What foods are not recommended?  Grains  Breads that are made with saturated or trans fats, oils, or whole milk. Croissants. Butter rolls. Cheese breads. Sweet rolls. Donuts. Buttered popcorn. Chow mein noodles. High-fat crackers, such as cheese or butter crackers.  Meats and Other Protein Sources  Fatty meats, such as hotdogs, short ribs, sausage, spareribs, brewer, rib eye roast or steak, and mutton. High-fat deli meats, such as salami and bologna. Caviar. Domestic duck and goose. Organ meats, such as kidney, liver, sweetbreads, and heart.  Dairy  Cream, sour cream, cream cheese, and creamed cottage cheese. Whole-milk cheeses, including blue (emerita), Arjay Camilo, Brie, Efrain, American, Havarti, Swiss, cheddar, Camembert, and [place]. Whole or 2% milk that is liquid, evaporated, or condensed. Whole buttermilk. Cream sauce or high-fat cheese sauce. Yogurt that is made from whole milk.  Beverages  Regular sodas and juice drinks with added sugar.  Sweets and Desserts  Frosting. Pudding. Cookies. Cakes other than paul food cake. Candy that has milk chocolate or white chocolate, hydrogenated fat, butter, coconut, or unknown ingredients. Buttered  syrups. Full-fat ice cream or ice cream drinks.  Fats and Oils  Gravy that has suet, meat fat, or shortening. Cocoa butter, hydrogenated oils, palm oil, coconut oil, palm kernel oil. These can often be found in baked products, candy, fried foods, nondairy creamers, and whipped toppings. Solid fats and shortenings, including brewer fat, salt pork, lard, and butter. Nondairy cream substitutes, such as coffee creamers and sour cream substitutes. Salad dressings that are made of unknown oils, cheese, or sour cream.  The items listed above may not be a complete list of foods and beverages to avoid. Contact your dietitian for more information.  This information is not intended to replace advice given to you by your health care provider. Make sure you discuss any questions you have with your health care provider.  Document Released: 06/18/2013 Document Revised: 05/25/2017 Document Reviewed: 06/11/2015  [employer] Interactive Patient Education © 2019 [employer] Inc.

## 2019-05-06 ENCOUNTER — OFFICE VISIT (OUTPATIENT)
Dept: INTERNAL MEDICINE | Facility: CLINIC | Age: 34
End: 2019-05-06

## 2019-05-06 VITALS
DIASTOLIC BLOOD PRESSURE: 96 MMHG | BODY MASS INDEX: 41.62 KG/M2 | OXYGEN SATURATION: 97 % | SYSTOLIC BLOOD PRESSURE: 146 MMHG | HEART RATE: 82 BPM | RESPIRATION RATE: 16 BRPM | TEMPERATURE: 98 F | WEIGHT: 314 LBS | HEIGHT: 73 IN

## 2019-05-06 DIAGNOSIS — I10 ESSENTIAL HYPERTENSION: Primary | ICD-10-CM

## 2019-05-06 DIAGNOSIS — R42 DIZZINESS: ICD-10-CM

## 2019-05-06 PROCEDURE — 99213 OFFICE O/P EST LOW 20 MIN: CPT | Performed by: NURSE PRACTITIONER

## 2019-05-06 RX ORDER — HYDROCHLOROTHIAZIDE 50 MG/1
50 TABLET ORAL DAILY
Start: 2019-05-06 | End: 2019-05-29 | Stop reason: SDUPTHER

## 2019-05-06 NOTE — PROGRESS NOTES
CC: dizziness    History:  Gagandeep Ordoñez is a 33 y.o. male who presents today for evaluation of the above problems.    Has been feeling dizzy since Saturday.  His wife feels that he has been breathing heavier, though he hasn't noticed this.   Feels slightly off balance, though if he is on an elevator the sensation is worse.  Has been swimming recently, but this started before that.    Has also had a mild headache.  Has had some mild congestion in the AM that resolves after he gets up.    He does have a BP cuff at home and his pressure has been running high 140s to 150s over 100-110.  In our office today it is 146/96.      ROS:  Review of Systems   Constitutional: Negative for fever.   HENT: Positive for congestion (am only). Negative for ear pain, sinus pressure, sinus pain and sore throat.    Respiratory: Negative for cough and shortness of breath.    Cardiovascular: Negative for chest pain.   Neurological: Positive for headaches.       No Known Allergies  Past Medical History:   Diagnosis Date   • Acid reflux    • Hypertension    • Type 2 diabetes mellitus (CMS/HCC)      Past Surgical History:   Procedure Laterality Date   • HAND SURGERY     • HERNIA REPAIR       Family History   Problem Relation Age of Onset   • Diabetes Mother    • Diabetes Father    • Stroke Father       reports that he has never smoked. He has quit using smokeless tobacco. He reports that he drinks alcohol. He reports that he does not use drugs.      Current Outpatient Medications:   •  amLODIPine (NORVASC) 10 MG tablet, Take 1 tablet by mouth Daily., Disp: 90 tablet, Rfl: 1  •  Doxylamine Succinate, Sleep, (SLEEP AID PO), Take  by mouth Every Night., Disp: , Rfl:   •  hydrochlorothiazide (HYDRODIURIL) 50 MG tablet, Take 1 tablet by mouth Daily., Disp: , Rfl:   •  losartan (COZAAR) 100 MG tablet, Take 1 tablet by mouth Daily., Disp: 90 tablet, Rfl: 1  •  metFORMIN ER (GLUCOPHAGE-XR) 500 MG 24 hr tablet, Take 1 tablet by mouth 2 (Two) Times a  "Day., Disp: 180 tablet, Rfl: 1  •  Misc Natural Products (CORTISOL MANAGER PO), Take 3 tablets by mouth Daily., Disp: , Rfl:   •  ondansetron ODT (ZOFRAN ODT) 8 MG disintegrating tablet, Take 1 tablet by mouth Every 8 (Eight) Hours As Needed for Nausea or Vomiting., Disp: 12 tablet, Rfl: 0    OBJECTIVE:  /96 (BP Location: Left arm, Patient Position: Sitting, Cuff Size: Large Adult)   Pulse 82   Temp 98 °F (36.7 °C) (Oral)   Resp 16   Ht 185.4 cm (73\")   Wt (!) 142 kg (314 lb)   SpO2 97%   BMI 41.43 kg/m²    Physical Exam   Constitutional: He is oriented to person, place, and time. Vital signs are normal. He appears well-developed and well-nourished.   HENT:   Right Ear: Tympanic membrane, external ear and ear canal normal.   Left Ear: Tympanic membrane, external ear and ear canal normal.   Neck: Carotid bruit is not present.   Cardiovascular: Normal rate and regular rhythm.   Pulmonary/Chest: Effort normal and breath sounds normal.   Neurological: He is alert and oriented to person, place, and time.   Psychiatric: He has a normal mood and affect. His behavior is normal.   Vitals reviewed.      Assessment/Plan    Gagandeep was seen today for dizziness.    Diagnoses and all orders for this visit:    Essential hypertension  -     hydrochlorothiazide (HYDRODIURIL) 50 MG tablet; Take 1 tablet by mouth Daily.    Dizziness      Since he is already on maximum dose of losartan and amlodipine, will increase HCTZ and plan to re-evaluate at his next appt. With Dr. Walker on May 17.  He was advised to let us known if his condition worsens prior to this.     An After Visit Summary was printed and given to the patient at discharge.  Return for Next scheduled follow up.         Starr Milian, KARUNA  5/6/2019   "

## 2019-05-07 ENCOUNTER — TELEPHONE (OUTPATIENT)
Dept: INTERNAL MEDICINE | Facility: CLINIC | Age: 34
End: 2019-05-07

## 2019-05-07 NOTE — TELEPHONE ENCOUNTER
"Patient called back and needs his work excuse for Sunday and Monday nights faxed, plus a statement saying that he can work tonight and tomorrow night but should not be out on the floor, he should be in a \"Control\" position because of his blood pressure. Is this OK? If so, after printed, they should all be faxed to [facility] at [phone].  MM  "

## 2019-05-07 NOTE — TELEPHONE ENCOUNTER
Ok.  Continue watching it for the next several days. Keep a record and bring to visit on the 17th with Aaron.

## 2019-05-07 NOTE — TELEPHONE ENCOUNTER
"Patient called back and needs his work excuse for Sunday and Monday nights faxed, plus a statement saying that he can work tonight and tomorrow night but should not be out on the floor, he should be in a \"Control\" position because of his blood pressure. Is this OK? If so, after printed, they should all be faxed to [facility] at [phone].  MM  "

## 2019-05-07 NOTE — TELEPHONE ENCOUNTER
Patient called to let you know that his BP today after taking his new medication is 138/100.    MM

## 2019-05-07 NOTE — TELEPHONE ENCOUNTER
I spoke with the patient and he will keep readings and call on Friday if it has not come down by then.    EMY

## 2019-05-08 NOTE — TELEPHONE ENCOUNTER
Work note faxed yesterday. If this is for HTN he needs better BP management and should have further follow up

## 2019-05-17 ENCOUNTER — OFFICE VISIT (OUTPATIENT)
Dept: INTERNAL MEDICINE | Facility: CLINIC | Age: 34
End: 2019-05-17

## 2019-05-17 VITALS
HEIGHT: 73 IN | TEMPERATURE: 98 F | RESPIRATION RATE: 16 BRPM | WEIGHT: 311 LBS | OXYGEN SATURATION: 96 % | BODY MASS INDEX: 41.22 KG/M2 | HEART RATE: 75 BPM | SYSTOLIC BLOOD PRESSURE: 126 MMHG | DIASTOLIC BLOOD PRESSURE: 84 MMHG

## 2019-05-17 DIAGNOSIS — I10 ESSENTIAL HYPERTENSION: Primary | ICD-10-CM

## 2019-05-17 DIAGNOSIS — E11.9 TYPE 2 DIABETES MELLITUS WITHOUT COMPLICATION, WITHOUT LONG-TERM CURRENT USE OF INSULIN (HCC): ICD-10-CM

## 2019-05-17 DIAGNOSIS — Z56.6 STRESS AT WORK: ICD-10-CM

## 2019-05-17 DIAGNOSIS — E66.01 MORBID OBESITY WITH BMI OF 40.0-44.9, ADULT (HCC): ICD-10-CM

## 2019-05-17 PROCEDURE — 99214 OFFICE O/P EST MOD 30 MIN: CPT | Performed by: FAMILY MEDICINE

## 2019-05-17 SDOH — HEALTH STABILITY - MENTAL HEALTH: OTHER PHYSICAL AND MENTAL STRAIN RELATED TO WORK: Z56.6

## 2019-05-17 NOTE — ASSESSMENT & PLAN NOTE
Patient may benefit from small dose of antidepressant but he is not interested in trying one at this time.  He will let us know if he changes his mind, follow-up in 3 months unless he starts a new medication and then we will see him back in 4 weeks from starting antidepressant

## 2019-05-17 NOTE — PROGRESS NOTES
CC:   Chief Complaint   Patient presents with   • Follow-up   • Diabetes       History:  Gagandeep Ordoñez is a 33 y.o. 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  Occasionally he experiences headache when his blood pressure is elevated, he did ask for a letter last week to be removed to a control station given elevated blood pressure at that time, diastolic could be as high as 100s.  He is taking a cortisol manager that is not FDA regulated, it is recommended that he take 2 capsules based on the product information, but he has been taking as high as 4 daily.    He has lost 3 pounds since last visit    ROS:  Review of Systems   Neurological: Headaches: HTN.   Psychiatric/Behavioral: Positive for agitation and behavioral problems.       Mr. Ordoñez  reports that he has never smoked. He has quit using smokeless tobacco. He reports that he drinks alcohol. He reports that he does not use drugs.      Current Outpatient Medications:   •  amLODIPine (NORVASC) 10 MG tablet, Take 1 tablet by mouth Daily., Disp: 90 tablet, Rfl: 1  •  Doxylamine Succinate, Sleep, (SLEEP AID PO), Take  by mouth Every Night., Disp: , Rfl:   •  hydrochlorothiazide (HYDRODIURIL) 50 MG tablet, Take 1 tablet by mouth Daily., Disp: , Rfl:   •  losartan (COZAAR) 100 MG tablet, Take 1 tablet by mouth Daily., Disp: 90 tablet, Rfl: 1  •  metFORMIN ER (GLUCOPHAGE-XR) 500 MG 24 hr tablet, Take 1 tablet by mouth 2 (Two) Times a Day., Disp: 180 tablet, Rfl: 1  •  Misc Natural Products (CORTISOL MANAGER PO), Take 3 tablets by mouth Daily., Disp: , Rfl:   •  ondansetron ODT (ZOFRAN ODT) 8 MG disintegrating tablet, Take 1 tablet by mouth Every 8 (Eight) Hours As Needed for Nausea or Vomiting., Disp: 12 tablet, Rfl:  "0      OBJECTIVE:  /84 (BP Location: Left arm, Patient Position: Sitting, Cuff Size: Adult)   Pulse 75   Temp 98 °F (36.7 °C) (Temporal)   Resp 16   Ht 185.4 cm (73\")   Wt (!) 141 kg (311 lb)   SpO2 96%   BMI 41.03 kg/m²    Physical Exam   Constitutional: He is oriented to person, place, and time. No distress.   HENT:   Head: Normocephalic and atraumatic.   Nose: Nose normal.   Eyes: Conjunctivae are normal. Right eye exhibits no discharge. Left eye exhibits no discharge. No scleral icterus.   Neck: No tracheal deviation present.   Cardiovascular: Normal rate, regular rhythm and normal heart sounds. Exam reveals no gallop and no friction rub.   No murmur heard.  Pulmonary/Chest: Effort normal and breath sounds normal. No respiratory distress. He has no wheezes. He has no rales.   Neurological: He is alert and oriented to person, place, and time.   Skin: Skin is warm and dry. He is not diaphoretic. No pallor.   Psychiatric: He has a normal mood and affect. His behavior is normal. Judgment and thought content normal.   Nursing note and vitals reviewed.      Assessment/Plan    Problem List Items Addressed This Visit        Cardiovascular and Mediastinum    Essential hypertension - Primary     Currently stable with elevation in the setting of stress, no change in medical therapy            Digestive    Morbid obesity with BMI of 40.0-44.9, adult (CMS/[facility])     Encouraged exercise, repeating CMP given mild LFT elevation on last CMP         Relevant Orders    Comprehensive Metabolic Panel       Endocrine    Type 2 diabetes mellitus without complication, without long-term current use of insulin (CMS/[facility])     Stable         Relevant Orders    Hemoglobin A1c    Comprehensive Metabolic Panel    MicroAlbumin, Urine, Random - Urine, Clean Catch       Other    Stress at work     Patient may benefit from small dose of antidepressant but he is not interested in trying one at this time.  He will let us know if he " changes his mind, follow-up in 3 months unless he starts a new medication and then we will see him back in 4 weeks from starting antidepressant               Patient's Body mass index is 41.03 kg/m². BMI is above normal parameters. Recommendations include: exercise counseling and nutrition counseling.      An After Visit Summary was printed and given to the patient at discharge.  Return in about 3 months (around 8/17/2019). Sooner if problems arise.         Mynor Walker D.O.  Family Medicine  Osteopathic Neuromusculoskeletal Medicine

## 2019-05-29 DIAGNOSIS — I10 ESSENTIAL HYPERTENSION: ICD-10-CM

## 2019-05-29 RX ORDER — HYDROCHLOROTHIAZIDE 50 MG/1
50 TABLET ORAL DAILY
Qty: 30 TABLET | Refills: 5
Start: 2019-05-29 | End: 2019-05-30 | Stop reason: SDUPTHER

## 2019-05-30 DIAGNOSIS — I10 ESSENTIAL HYPERTENSION: ICD-10-CM

## 2019-05-30 RX ORDER — HYDROCHLOROTHIAZIDE 50 MG/1
50 TABLET ORAL DAILY
Qty: 30 TABLET | Refills: 5 | Status: SHIPPED | OUTPATIENT
Start: 2019-05-30 | End: 2019-11-06 | Stop reason: SDUPTHER

## 2019-05-30 NOTE — TELEPHONE ENCOUNTER
Marina went to the pharmacy to  [facility] and it was not there.  Can we resend the prescription to his pharmacy?

## 2019-06-13 ENCOUNTER — LAB (OUTPATIENT)
Dept: LAB | Facility: HOSPITAL | Age: 34
End: 2019-06-13

## 2019-06-13 DIAGNOSIS — E11.9 TYPE 2 DIABETES MELLITUS WITHOUT COMPLICATION, WITHOUT LONG-TERM CURRENT USE OF INSULIN (HCC): ICD-10-CM

## 2019-06-13 DIAGNOSIS — E66.01 MORBID OBESITY WITH BMI OF 40.0-44.9, ADULT (HCC): ICD-10-CM

## 2019-06-13 LAB
ALBUMIN SERPL-MCNC: 4.2 G/DL (ref 3.5–5)
ALBUMIN/GLOB SERPL: 1.1 G/DL (ref 1.1–2.5)
ALP SERPL-CCNC: 89 U/L (ref 24–120)
ALT SERPL W P-5'-P-CCNC: 43 U/L (ref 0–54)
ANION GAP SERPL CALCULATED.3IONS-SCNC: 9 MMOL/L (ref 4–13)
AST SERPL-CCNC: 32 U/L (ref 7–45)
BILIRUB SERPL-MCNC: 0.6 MG/DL (ref 0.1–1)
BUN BLD-MCNC: 15 MG/DL (ref 5–21)
BUN/CREAT SERPL: 18.3
CALCIUM SPEC-SCNC: 9.7 MG/DL (ref 8.4–10.4)
CHLORIDE SERPL-SCNC: 100 MMOL/L (ref 98–110)
CO2 SERPL-SCNC: 33 MMOL/L (ref 24–31)
CREAT BLD-MCNC: 0.82 MG/DL (ref 0.5–1.4)
GFR SERPL CREATININE-BSD FRML MDRD: 107 ML/MIN/1.73
GLOBULIN UR ELPH-MCNC: 3.7 GM/DL
GLUCOSE BLD-MCNC: 130 MG/DL (ref 70–100)
HBA1C MFR BLD: 6.7 %
POTASSIUM BLD-SCNC: 4.4 MMOL/L (ref 3.5–5.3)
PROT SERPL-MCNC: 7.9 G/DL (ref 6.3–8.7)
SODIUM BLD-SCNC: 142 MMOL/L (ref 135–145)

## 2019-06-13 PROCEDURE — 36415 COLL VENOUS BLD VENIPUNCTURE: CPT

## 2019-06-13 PROCEDURE — 82043 UR ALBUMIN QUANTITATIVE: CPT | Performed by: FAMILY MEDICINE

## 2019-06-13 PROCEDURE — 83036 HEMOGLOBIN GLYCOSYLATED A1C: CPT

## 2019-06-13 PROCEDURE — 80053 COMPREHEN METABOLIC PANEL: CPT

## 2019-06-14 LAB — MICROALBUMIN UR-MCNC: <3 UG/ML

## 2019-06-17 ENCOUNTER — TELEPHONE (OUTPATIENT)
Dept: INTERNAL MEDICINE | Facility: CLINIC | Age: 34
End: 2019-06-17

## 2019-06-17 DIAGNOSIS — Z56.6 STRESS AT WORK: Primary | ICD-10-CM

## 2019-06-17 SDOH — HEALTH STABILITY - MENTAL HEALTH: OTHER PHYSICAL AND MENTAL STRAIN RELATED TO WORK: Z56.6

## 2019-06-17 NOTE — TELEPHONE ENCOUNTER
I spoke with the patient and he states that he is still having a problem with feeling aggravated and would like to know if you are still willing to send in something for him please.    MM

## 2019-06-17 NOTE — TELEPHONE ENCOUNTER
----- Message from Mynor Walker DO sent at 6/17/2019  1:09 PM CDT -----  Mild hyperglycemia with normal kidney, liver, urine study

## 2019-06-18 RX ORDER — FLUOXETINE HYDROCHLORIDE 20 MG/1
20 CAPSULE ORAL DAILY
Qty: 30 CAPSULE | Refills: 1 | Status: SHIPPED | OUTPATIENT
Start: 2019-06-18 | End: 2019-07-18 | Stop reason: SDUPTHER

## 2019-06-18 NOTE — TELEPHONE ENCOUNTER
Will start Prozac 20 mg daily, tell him to take it when he has what he feels are his morning hours as he does work during shifts.  If this medication worsens his mood or causes symptoms of dizziness or vision changes have him stop it as well as stop for any other unwanted side effects and notify our office.  He needs a follow-up in 4 weeks

## 2019-07-18 ENCOUNTER — OFFICE VISIT (OUTPATIENT)
Dept: INTERNAL MEDICINE | Facility: CLINIC | Age: 34
End: 2019-07-18

## 2019-07-18 VITALS
DIASTOLIC BLOOD PRESSURE: 78 MMHG | WEIGHT: 311.19 LBS | TEMPERATURE: 98.7 F | RESPIRATION RATE: 12 BRPM | HEART RATE: 58 BPM | SYSTOLIC BLOOD PRESSURE: 124 MMHG | BODY MASS INDEX: 41.24 KG/M2 | HEIGHT: 73 IN | OXYGEN SATURATION: 98 %

## 2019-07-18 DIAGNOSIS — B35.1 ONYCHOMYCOSIS: ICD-10-CM

## 2019-07-18 DIAGNOSIS — R21 RASH AND NONSPECIFIC SKIN ERUPTION: ICD-10-CM

## 2019-07-18 DIAGNOSIS — Z56.6 STRESS AT WORK: Primary | ICD-10-CM

## 2019-07-18 DIAGNOSIS — R45.1 AGITATION: ICD-10-CM

## 2019-07-18 PROCEDURE — 99214 OFFICE O/P EST MOD 30 MIN: CPT | Performed by: FAMILY MEDICINE

## 2019-07-18 RX ORDER — FLUOXETINE HYDROCHLORIDE 20 MG/1
20 CAPSULE ORAL DAILY
Qty: 90 CAPSULE | Refills: 1 | Status: SHIPPED | OUTPATIENT
Start: 2019-07-18 | End: 2019-12-11 | Stop reason: SDUPTHER

## 2019-07-18 RX ORDER — HYDROCHLOROTHIAZIDE 25 MG/1
1 TABLET ORAL DAILY
COMMUNITY
Start: 2019-05-15 | End: 2019-07-18 | Stop reason: DRUGHIGH

## 2019-07-18 SDOH — HEALTH STABILITY - MENTAL HEALTH: OTHER PHYSICAL AND MENTAL STRAIN RELATED TO WORK: Z56.6

## 2019-07-18 NOTE — PROGRESS NOTES
"CC:   Chief Complaint   Patient presents with   • Follow-up     Patient reports the Prozac is working well, but is still getting some high BP readings around 3-4 am       History:  Gagandeep Ordoñez is a 33 y.o. male who presents today for follow-up for evaluation of the above:    Recently started Prozac for increased mood and irritation at work.    Toenails have been discolored, no previous Tx    Has rash at abdomen where his pants fit him, uses nystatin cream for worsening.     ROS:  Review of Systems   Skin: Positive for rash. Color change: toenails.   Psychiatric/Behavioral: Negative for agitation and behavioral problems.       Mr. Ordoñez  reports that he has never smoked. He has quit using smokeless tobacco. He reports that he drinks alcohol. He reports that he does not use drugs.      Current Outpatient Medications:   •  amLODIPine (NORVASC) 10 MG tablet, Take 1 tablet by mouth Daily., Disp: 90 tablet, Rfl: 1  •  Doxylamine Succinate, Sleep, (SLEEP AID PO), Take  by mouth Every Night., Disp: , Rfl:   •  FLUoxetine (PROzac) 20 MG capsule, Take 1 capsule by mouth Daily., Disp: 30 capsule, Rfl: 1  •  hydrochlorothiazide (HYDRODIURIL) 50 MG tablet, Take 1 tablet by mouth Daily., Disp: 30 tablet, Rfl: 5  •  losartan (COZAAR) 100 MG tablet, Take 1 tablet by mouth Daily., Disp: 90 tablet, Rfl: 1  •  metFORMIN ER (GLUCOPHAGE-XR) 500 MG 24 hr tablet, Take 1 tablet by mouth 2 (Two) Times a Day., Disp: 180 tablet, Rfl: 1  •  Misc Natural Products (CORTISOL MANAGER PO), Take 3 tablets by mouth Daily., Disp: , Rfl:     OBJECTIVE:  /78 (BP Location: Left arm, Patient Position: Sitting, Cuff Size: Large Adult)   Pulse 58   Temp 98.7 °F (37.1 °C) (Temporal)   Resp 12   Ht 185.4 cm (73\")   Wt (!) 141 kg (311 lb 3 oz)   SpO2 98%   BMI 41.06 kg/m²    Physical Exam   Constitutional: He is oriented to person, place, and time. No distress.   HENT:   Head: Normocephalic and atraumatic.   Nose: Nose normal.   Eyes: " Conjunctivae are normal. Right eye exhibits no discharge. Left eye exhibits no discharge. No scleral icterus.   Neck: No tracheal deviation present.   Cardiovascular: Normal rate, regular rhythm and normal heart sounds. Exam reveals no gallop and no friction rub.   No murmur heard.  Pulmonary/Chest: Effort normal and breath sounds normal. No respiratory distress. He has no wheezes. He has no rales.   Neurological: He is alert and oriented to person, place, and time.   Skin: Skin is warm and dry. Rash:  erythemic rash of pannus consistent with candida, dry today. He is not diaphoretic. No pallor.   Thickened discolored nails of right foot that do not involve the entirety of the nail   Psychiatric: He has a normal mood and affect. His behavior is normal. Judgment and thought content normal.   Nursing note and vitals reviewed.      Assessment/Plan     Diagnosis Plan   1. Stress at work  FLUoxetine (PROzac) 20 MG capsule   2. Agitation     3. Onychomycosis     4. Rash and nonspecific skin eruption     -Continue Prozac  -Discussed terbinafine, patient will hold off for now  -Okay to continue OTC care for skin rash and nystatin as needed    An After Visit Summary was printed and given to the patient at discharge.  Return in about 3 months (around 10/18/2019). Sooner if problems arise.         Mynor Walker D.O.  Family [facility]  Osteopathic Neuromusculoskeletal Medicine

## 2019-08-16 ENCOUNTER — TELEPHONE (OUTPATIENT)
Dept: INTERNAL MEDICINE | Facility: CLINIC | Age: 34
End: 2019-08-16

## 2019-09-14 DIAGNOSIS — I10 ESSENTIAL HYPERTENSION: ICD-10-CM

## 2019-09-14 RX ORDER — METFORMIN HYDROCHLORIDE 500 MG/1
TABLET, EXTENDED RELEASE ORAL
Qty: 60 TABLET | Refills: 0 | Status: CANCELLED | OUTPATIENT
Start: 2019-09-14

## 2019-09-14 RX ORDER — LOSARTAN POTASSIUM 100 MG/1
TABLET ORAL
Qty: 30 TABLET | Refills: 0 | Status: CANCELLED | OUTPATIENT
Start: 2019-09-14

## 2019-09-16 DIAGNOSIS — I10 ESSENTIAL HYPERTENSION: ICD-10-CM

## 2019-09-16 RX ORDER — METFORMIN HYDROCHLORIDE 500 MG/1
500 TABLET, EXTENDED RELEASE ORAL 2 TIMES DAILY
Qty: 180 TABLET | Refills: 1 | Status: SHIPPED | OUTPATIENT
Start: 2019-09-16 | End: 2019-10-28 | Stop reason: SDUPTHER

## 2019-09-16 RX ORDER — LOSARTAN POTASSIUM 100 MG/1
100 TABLET ORAL DAILY
Qty: 90 TABLET | Refills: 1 | Status: SHIPPED | OUTPATIENT
Start: 2019-09-16 | End: 2020-02-07

## 2019-10-28 ENCOUNTER — OFFICE VISIT (OUTPATIENT)
Dept: INTERNAL MEDICINE | Facility: CLINIC | Age: 34
End: 2019-10-28

## 2019-10-28 ENCOUNTER — TELEPHONE (OUTPATIENT)
Dept: INTERNAL MEDICINE | Facility: CLINIC | Age: 34
End: 2019-10-28

## 2019-10-28 VITALS
OXYGEN SATURATION: 98 % | SYSTOLIC BLOOD PRESSURE: 120 MMHG | HEART RATE: 76 BPM | HEIGHT: 73 IN | WEIGHT: 310.38 LBS | RESPIRATION RATE: 12 BRPM | TEMPERATURE: 98.2 F | BODY MASS INDEX: 41.14 KG/M2 | DIASTOLIC BLOOD PRESSURE: 76 MMHG

## 2019-10-28 DIAGNOSIS — R45.1 AGITATION: ICD-10-CM

## 2019-10-28 DIAGNOSIS — E66.01 MORBID OBESITY WITH BMI OF 40.0-44.9, ADULT (HCC): ICD-10-CM

## 2019-10-28 DIAGNOSIS — E11.9 TYPE 2 DIABETES MELLITUS WITHOUT COMPLICATION, WITHOUT LONG-TERM CURRENT USE OF INSULIN (HCC): Primary | ICD-10-CM

## 2019-10-28 DIAGNOSIS — I10 ESSENTIAL HYPERTENSION: ICD-10-CM

## 2019-10-28 DIAGNOSIS — G47.33 OSA (OBSTRUCTIVE SLEEP APNEA): ICD-10-CM

## 2019-10-28 LAB — HBA1C MFR BLD: 7.2 %

## 2019-10-28 PROCEDURE — 99214 OFFICE O/P EST MOD 30 MIN: CPT | Performed by: FAMILY MEDICINE

## 2019-10-28 PROCEDURE — 83036 HEMOGLOBIN GLYCOSYLATED A1C: CPT | Performed by: FAMILY MEDICINE

## 2019-10-28 RX ORDER — METFORMIN HYDROCHLORIDE EXTENDED-RELEASE TABLETS 1000 MG/1
1000 TABLET, FILM COATED, EXTENDED RELEASE ORAL 2 TIMES DAILY
Qty: 180 TABLET | Refills: 1 | Status: SHIPPED | OUTPATIENT
Start: 2019-10-28 | End: 2020-01-27 | Stop reason: SDUPTHER

## 2019-10-28 NOTE — PROGRESS NOTES
CC:   Chief Complaint   Patient presents with   • Follow-up       History:  Gagandeep Ordoñez is a 33 y.o. male who presents today for follow-up for evaluation of the above:    A1c 7.2 today on metformin 500 mg BID    Blood pressure well controlled on HCTZ and amlodipine    Mood well control on prozac    Wife thinks that he has sleep apnea, constantly tired.     [place]:  Sitting and Reading: 3  Watching TV: 3  Sitting, inactive in a public place: 3  As a passenger in a car for an hour without a break: 3  Lying down to rest in the afternoon: 3  Sitting and talking to someone: 0  Sitting quietly after a lunch without alcohol: 3  In a car, while stopped for a few minutes in traffic: 0  Total: 18      ROS:  Review of Systems   Constitutional: Positive for fatigue.   Respiratory: Negative.    Cardiovascular: Negative.    Psychiatric/Behavioral: Positive for sleep disturbance. Negative for agitation and behavioral problems.       Mr. Ordoñez  reports that he has never smoked. He has quit using smokeless tobacco. He reports that he drinks alcohol. He reports that he does not use drugs.      Current Outpatient Medications:   •  amLODIPine (NORVASC) 10 MG tablet, Take 1 tablet by mouth Daily., Disp: 90 tablet, Rfl: 1  •  FLUoxetine (PROzac) 20 MG capsule, Take 1 capsule by mouth Daily., Disp: 90 capsule, Rfl: 1  •  hydrochlorothiazide (HYDRODIURIL) 50 MG tablet, Take 1 tablet by mouth Daily., Disp: 30 tablet, Rfl: 5  •  losartan (COZAAR) 100 MG tablet, Take 1 tablet by mouth Daily., Disp: 90 tablet, Rfl: 1  •  metFORMIN ER (FORTAMET) 1000 MG (OSM) 24 hr tablet, Take 1 tablet by mouth 2 (Two) Times a Day., Disp: 180 tablet, Rfl: 1  •  Doxylamine Succinate, Sleep, (SLEEP AID PO), Take  by mouth Every Night., Disp: , Rfl:   •  Misc Natural Products (CORTISOL MANAGER PO), Take 3 tablets by mouth Daily., Disp: , Rfl:       OBJECTIVE:  /76 (BP Location: Left arm, Patient Position: Sitting, Cuff Size: Large Adult)   Pulse 76    "Temp 98.2 °F (36.8 °C) (Temporal)   Resp 12   Ht 185.4 cm (73\")   Wt (!) 141 kg (310 lb 6 oz)   SpO2 98%   BMI 40.95 kg/m²    Physical Exam   Constitutional: He is oriented to person, place, and time. No distress.   HENT:   Head: Normocephalic and atraumatic.   Nose: Nose normal.   Eyes: Conjunctivae are normal. Right eye exhibits no discharge. Left eye exhibits no discharge. No scleral icterus.   Neck: No tracheal deviation present.   Cardiovascular: Normal rate, regular rhythm and normal heart sounds. Exam reveals no gallop and no friction rub.   No murmur heard.  Pulmonary/Chest: Effort normal and breath sounds normal. No respiratory distress. He has no wheezes. He has no rales.   Neurological: He is alert and oriented to person, place, and time.   Normal diabetic foot exam   Skin: Skin is warm and dry. He is not diaphoretic. No pallor.   Psychiatric: He has a normal mood and affect. His behavior is normal. Judgment and thought content normal.   Nursing note and vitals reviewed.    Assessment/Plan     Diagnosis Plan   1. Type 2 diabetes mellitus without complication, without long-term current use of insulin (CMS/[facility])  POCT glycated hemoglobin, total    metFORMIN ER (FORTAMET) 1000 MG (OSM) 24 hr tablet   2. Morbid obesity with BMI of 40.0-44.9, adult (CMS/HCC)     3. Essential hypertension     4. Agitation     5. YVES (obstructive sleep apnea)  Home Sleep Study   -Increase metformin to maximum dose  -Continue blood pressure regimen  -Continue Prozac  -Home sleep study, highly suspicious of YVES    An After Visit Summary was printed and given to the patient at discharge.  Return in about 3 months (around 1/28/2020). Sooner if problems arise.         Mynor Walker D.O.  Family Medicine  Osteopathic Neuromusculoskeletal Medicine  "

## 2019-10-28 NOTE — TELEPHONE ENCOUNTER
Marina called from  pharmacy stating that the patients insurance does not cover Metformin XR 1000 mg.  Please send replacement.    EMY

## 2019-11-06 DIAGNOSIS — I10 ESSENTIAL HYPERTENSION: ICD-10-CM

## 2019-11-06 RX ORDER — HYDROCHLOROTHIAZIDE 50 MG/1
50 TABLET ORAL DAILY
Qty: 30 TABLET | Refills: 5 | Status: SHIPPED | OUTPATIENT
Start: 2019-11-06 | End: 2019-12-09 | Stop reason: SDUPTHER

## 2019-11-07 ENCOUNTER — HOSPITAL ENCOUNTER (OUTPATIENT)
Dept: SLEEP MEDICINE | Facility: HOSPITAL | Age: 34
Discharge: HOME | End: 2019-11-07
Admitting: FAMILY MEDICINE

## 2019-11-07 DIAGNOSIS — G47.33 OSA (OBSTRUCTIVE SLEEP APNEA): ICD-10-CM

## 2019-11-07 PROCEDURE — 95800 SLP STDY UNATTENDED: CPT

## 2019-11-07 PROCEDURE — 95800 SLP STDY UNATTENDED: CPT | Performed by: PSYCHIATRY & NEUROLOGY

## 2019-11-11 ENCOUNTER — TELEPHONE (OUTPATIENT)
Dept: INTERNAL MEDICINE | Facility: CLINIC | Age: 34
End: 2019-11-11

## 2019-11-11 DIAGNOSIS — G47.33 OSA (OBSTRUCTIVE SLEEP APNEA): Primary | ICD-10-CM

## 2019-11-11 NOTE — TELEPHONE ENCOUNTER
I tried to call the patient, LM that he does have YVES and another study has been ordered, to call with questions.    MM

## 2019-11-11 NOTE — TELEPHONE ENCOUNTER
----- Message from Mynor Walker DO sent at 11/11/2019  2:32 PM CST -----  Inform patient that he has obstructive sleep apnea, and that I ordered a sleep study for CPAP titration

## 2019-11-15 ENCOUNTER — TRANSCRIBE ORDERS (OUTPATIENT)
Dept: SLEEP MEDICINE | Facility: HOSPITAL | Age: 34
End: 2019-11-15

## 2019-11-15 DIAGNOSIS — G47.33 OBSTRUCTIVE SLEEP APNEA, ADULT: Primary | ICD-10-CM

## 2019-11-15 DIAGNOSIS — I10 ESSENTIAL HYPERTENSION: ICD-10-CM

## 2019-11-18 RX ORDER — AMLODIPINE BESYLATE 10 MG/1
10 TABLET ORAL DAILY
Qty: 90 TABLET | Refills: 1 | Status: SHIPPED | OUTPATIENT
Start: 2019-11-18 | End: 2020-05-06

## 2019-12-09 DIAGNOSIS — I10 ESSENTIAL HYPERTENSION: ICD-10-CM

## 2019-12-09 RX ORDER — HYDROCHLOROTHIAZIDE 50 MG/1
50 TABLET ORAL DAILY
Qty: 30 TABLET | Refills: 0 | Status: SHIPPED | OUTPATIENT
Start: 2019-12-09 | End: 2020-06-04 | Stop reason: SDUPTHER

## 2019-12-11 DIAGNOSIS — Z56.6 STRESS AT WORK: ICD-10-CM

## 2019-12-11 RX ORDER — FLUOXETINE HYDROCHLORIDE 20 MG/1
20 CAPSULE ORAL DAILY
Qty: 90 CAPSULE | Refills: 1 | Status: SHIPPED | OUTPATIENT
Start: 2019-12-11 | End: 2020-06-12 | Stop reason: SDUPTHER

## 2019-12-11 SDOH — HEALTH STABILITY - MENTAL HEALTH: OTHER PHYSICAL AND MENTAL STRAIN RELATED TO WORK: Z56.6

## 2020-01-27 ENCOUNTER — OFFICE VISIT (OUTPATIENT)
Dept: NEUROLOGY | Facility: CLINIC | Age: 35
End: 2020-01-27

## 2020-01-27 VITALS
WEIGHT: 310 LBS | OXYGEN SATURATION: 95 % | HEART RATE: 85 BPM | BODY MASS INDEX: 41.08 KG/M2 | DIASTOLIC BLOOD PRESSURE: 80 MMHG | SYSTOLIC BLOOD PRESSURE: 124 MMHG | HEIGHT: 73 IN

## 2020-01-27 DIAGNOSIS — G47.26 SHIFT WORK SLEEP DISORDER: ICD-10-CM

## 2020-01-27 DIAGNOSIS — E66.01 MORBID OBESITY WITH BMI OF 40.0-44.9, ADULT (HCC): ICD-10-CM

## 2020-01-27 DIAGNOSIS — G47.33 OSA (OBSTRUCTIVE SLEEP APNEA): Primary | ICD-10-CM

## 2020-01-27 PROCEDURE — 99203 OFFICE O/P NEW LOW 30 MIN: CPT | Performed by: PHYSICIAN ASSISTANT

## 2020-01-27 RX ORDER — ONDANSETRON 4 MG/1
TABLET, FILM COATED ORAL AS NEEDED
COMMUNITY
Start: 2019-12-24 | End: 2021-05-11 | Stop reason: SDUPTHER

## 2020-01-27 RX ORDER — DIPHENOXYLATE HYDROCHLORIDE AND ATROPINE SULFATE 2.5; .025 MG/1; MG/1
1 TABLET ORAL AS NEEDED
COMMUNITY
Start: 2019-12-24

## 2020-01-27 NOTE — PROGRESS NOTES
Neurology Consult Note    Referring Provider: Mynor Walker DO    Reason for Consultation:    Obstructive sleep apnea  Shiftwork sleep disorder  BMI 40.9    Subjective     History of Present Illness:  This is a very pleasant 34-year-old right-hand-dominant male routinely cared for by Mynor Walker DO, who is referred for evaluation of obstructive sleep apnea.  The patient works third shift at the local [employer] and has been diagnosed with shiftwork sleep disorder.  However, the patient has increasing BMI and weight over the last several years and has persistent somnolence during the nighttime hours when he is scheduled to be working.  He sleeps during the daytime hours, but also is involved in caring for his 5-month-old son and, therefore, has had difficulties with sleep and marketed hypersomnolence since that time.    He underwent overnight home sleep study on 11/8/2019 demonstrating an AHI of 33.7.  However, insurance requested for evaluation with sleep specialist prior to proceeding with split-night/titration study.    Current [place] scale is 17 and STOP-BANG is high.  Neck circumference is 20 inches.  BMI is 40.9.    Past Medical History:   Diagnosis Date   • Acid reflux    • Hypertension    • Type 2 diabetes mellitus (CMS/HCC)        No Known Allergies  Current Outpatient Medications on File Prior to Visit   Medication Sig   • amLODIPine (NORVASC) 10 MG tablet Take 1 tablet by mouth Daily.   • diphenoxylate-atropine (LOMOTIL) 2.5-0.025 MG per tablet As Needed.   • Doxylamine Succinate, Sleep, (SLEEP AID PO) Take  by mouth Every Night.   • FLUoxetine (PROzac) 20 MG capsule Take 1 capsule by mouth Daily.   • hydroCHLOROthiazide (HYDRODIURIL) 50 MG tablet Take 1 tablet by mouth Daily.   • losartan (COZAAR) 100 MG tablet Take 1 tablet by mouth Daily.   • metFORMIN (GLUCOPHAGE) 1000 MG tablet Take 1 tablet by mouth 2 (Two) Times a Day With Meals.   • ondansetron (ZOFRAN) 4 MG tablet As  Needed.   • [DISCONTINUED] metFORMIN ER (FORTAMET) 1000 MG (OSM) 24 hr tablet Take 1 tablet by mouth 2 (Two) Times a Day.   • [DISCONTINUED] Misc Natural Products (CORTISOL MANAGER PO) Take 3 tablets by mouth Daily.     No current facility-administered medications on file prior to visit.        Social History     Socioeconomic History   • Marital status:      Spouse name: Not on file   • Number of children: Not on file   • Years of education: Not on file   • Highest education level: Not on file   Tobacco Use   • Smoking status: Never Smoker   • Smokeless tobacco: Former User   Substance and Sexual Activity   • Alcohol use: Yes     Frequency: Monthly or less   • Drug use: No     Family History   Problem Relation Age of Onset   • Diabetes Mother    • Diabetes Father    • Stroke Father        Review of Systems  A 14 point review of systems was reviewed and was negative.    Objective      Vital Signs  Heart Rate:  [85] 85  BP: (124)/(80) 124/80    General Exam:  Head:  Normocephalic, atraumatic.  HEENT: PERRLA.  Full EOM.  Mallampati class III anatomy.  Neck:  No lymphadenopathy, thyromegaly or bruit.  Neck circumference 20 inches.  Cardiac:  Regular rate and rhythm.  Normal S1, S2.  No murmur, rub or gallop.  Lungs:  Clear to auscultation bilaterally.  No wheeze, rales or rhonchi.  Abdomen:  Non-tender, Non-distended.  Bowel sounds normoactive.  Morbidly obese.  Extremities: Full peripheral pulses.  No clubbing, cyanosis or edema.  Skin: No ulceration, breakdown or rash.       Assessment/Plan     Impression:  1.  Obstructive sleep apnea  2.  BMI 40.9  3.  Shiftwork sleep disorder      Plan:  1.  I have reviewed the previous home sleep study, his Kinder scale and STOP-BANG with him.  Patient needs to be scheduled for a sleep titration study to place on appropriate therapy.  I have recommended this to him and he is agreeable to proceed.    2.  Counseled on multimodal approach to treatment of sleep apnea to  include but not limited to diet, exercise, sleep hygiene, compliance with pap therapy. Encouraged lateral sleeping position and to avoid sedatives or alcohol close to bedtime. Risks of untreated sleep apnea were discussed to include but not limited to HTN, heart disease, stroke, cardiac arrhythmia such as AFIB, and dementia.    3.  I have recommended instituting regular cardiovascular exercise in the form of walking, biking or swimming 30-40 minutes at a time at least 3-4 times per week.    The patient will be scheduled for a split-night sleep study for titration of appropriate therapy and assistance in identifying appropriate mask/compliance.  The patient voices understanding and agreement with the plan of care and will call for any concerns or questions in the interim.    Thank you, Dr. Walker, for the referral and opportunity to participate in the care of your patient.          Arturo Mitchell PA-C  01/27/20  10:11 AM

## 2020-01-31 ENCOUNTER — OFFICE VISIT (OUTPATIENT)
Dept: INTERNAL MEDICINE | Facility: CLINIC | Age: 35
End: 2020-01-31

## 2020-01-31 VITALS
HEIGHT: 73 IN | HEART RATE: 98 BPM | OXYGEN SATURATION: 97 % | TEMPERATURE: 98.1 F | BODY MASS INDEX: 41.48 KG/M2 | RESPIRATION RATE: 18 BRPM | DIASTOLIC BLOOD PRESSURE: 74 MMHG | WEIGHT: 313 LBS | SYSTOLIC BLOOD PRESSURE: 114 MMHG

## 2020-01-31 DIAGNOSIS — Z56.6 STRESS AT WORK: ICD-10-CM

## 2020-01-31 DIAGNOSIS — I10 ESSENTIAL HYPERTENSION: ICD-10-CM

## 2020-01-31 DIAGNOSIS — E11.9 TYPE 2 DIABETES MELLITUS WITHOUT COMPLICATION, WITHOUT LONG-TERM CURRENT USE OF INSULIN (HCC): Primary | ICD-10-CM

## 2020-01-31 PROCEDURE — 99214 OFFICE O/P EST MOD 30 MIN: CPT | Performed by: FAMILY MEDICINE

## 2020-01-31 SDOH — HEALTH STABILITY - MENTAL HEALTH: OTHER PHYSICAL AND MENTAL STRAIN RELATED TO WORK: Z56.6

## 2020-01-31 NOTE — PROGRESS NOTES
"CC:   Chief Complaint   Patient presents with   • Hypertension     3 month follow-up.   • Diabetes       History:  Gagandeep Ordoñez is a 34 y.o. male who presents today for follow-up for evaluation of the above:    Here for follow-up diabetes on metformin, has not been taking blood sugars because he needs new glucometer.    He is starting a new job, feels like he may struggle with some irritation but is not quite sure if he still needs to keep taking Prozac.    Blood pressures well controlled.    ROS:  Review of Systems   Constitutional: Negative.    Psychiatric/Behavioral: Positive for agitation. Behavioral problem: stress at current job.       Mr. Ordoñez  reports that he has never smoked. He has quit using smokeless tobacco. He reports that he drinks alcohol. He reports that he does not use drugs.      Current Outpatient Medications:   •  amLODIPine (NORVASC) 10 MG tablet, Take 1 tablet by mouth Daily., Disp: 90 tablet, Rfl: 1  •  diphenoxylate-atropine (LOMOTIL) 2.5-0.025 MG per tablet, As Needed., Disp: , Rfl:   •  Doxylamine Succinate, Sleep, (SLEEP AID PO), Take  by mouth Every Night., Disp: , Rfl:   •  FLUoxetine (PROzac) 20 MG capsule, Take 1 capsule by mouth Daily., Disp: 90 capsule, Rfl: 1  •  hydroCHLOROthiazide (HYDRODIURIL) 50 MG tablet, Take 1 tablet by mouth Daily., Disp: 30 tablet, Rfl: 0  •  losartan (COZAAR) 100 MG tablet, Take 1 tablet by mouth Daily., Disp: 90 tablet, Rfl: 1  •  metFORMIN (GLUCOPHAGE) 1000 MG tablet, Take 1 tablet by mouth 2 (Two) Times a Day With Meals., Disp: 180 tablet, Rfl: 3  •  ondansetron (ZOFRAN) 4 MG tablet, As Needed., Disp: , Rfl:       OBJECTIVE:  /74 (BP Location: Left arm, Patient Position: Sitting, Cuff Size: Adult)   Pulse 98   Temp 98.1 °F (36.7 °C) (Temporal)   Resp 18   Ht 185.4 cm (73\")   Wt (!) 142 kg (313 lb)   SpO2 97%   BMI 41.30 kg/m²    Physical Exam   Constitutional: He is oriented to person, place, and time. No distress.   HENT:   Head: " Normocephalic and atraumatic.   Nose: Nose normal.   Eyes: Conjunctivae are normal. Right eye exhibits no discharge. Left eye exhibits no discharge. No scleral icterus.   Neck: No tracheal deviation present.   Cardiovascular: Normal rate, regular rhythm and normal heart sounds. Exam reveals no gallop and no friction rub.   No murmur heard.  Pulmonary/Chest: Effort normal and breath sounds normal. No respiratory distress. He has no wheezes. He has no rales.   Neurological: He is alert and oriented to person, place, and time.   Skin: Skin is warm and dry. He is not diaphoretic. No pallor.   Psychiatric: He has a normal mood and affect. His behavior is normal. Judgment and thought content normal.   Nursing note and vitals reviewed.      Assessment/Plan     Diagnosis Plan   1. Type 2 diabetes mellitus without complication, without long-term current use of insulin (CMS/[facility])  TSH    Hemoglobin A1c    Comprehensive Metabolic Panel    Lipid panel   2. Essential hypertension     3. Stress at work     labs above  Continue HTN regimen  Recommend continuing Prozac until he has been a new job for at least 1 month, then after breaking.  Can consider cessation      An After Visit Summary was printed and given to the patient at discharge.  Return in about 3 months (around 4/30/2020). Sooner if problems arise.         Mynor Walker D.O.  Family Medicine  Osteopathic Neuromusculoskeletal Medicine

## 2020-02-07 DIAGNOSIS — I10 ESSENTIAL HYPERTENSION: ICD-10-CM

## 2020-02-07 RX ORDER — LOSARTAN POTASSIUM 100 MG/1
100 TABLET ORAL DAILY
Qty: 30 TABLET | Refills: 5 | Status: SHIPPED | OUTPATIENT
Start: 2020-02-07 | End: 2020-08-05

## 2020-02-26 ENCOUNTER — TELEPHONE (OUTPATIENT)
Dept: NEUROLOGY | Facility: CLINIC | Age: 35
End: 2020-02-26

## 2020-02-26 DIAGNOSIS — G47.33 OBSTRUCTIVE SLEEP APNEA (ADULT) (PEDIATRIC): Primary | ICD-10-CM

## 2020-02-26 NOTE — TELEPHONE ENCOUNTER
KENDRICK KESSLER  [phone]    Has the patient been seen in the last 6 months? YES     PATIENT SPOUSE NIA CALLED AND NEEDED TO KNOW UPDATE ABOUT HUSBANDS SLEEP APPT FROM January. I NOTICED THAT THERE WAS A 3 MONTH FU APPT ON 4/27 BUT PATIENT STILL DOES NOT HAVE MACHINE AND THOUGHT HE NEEDED ANOTHER TEST BEFORE THIS APPT. PATIENT AND SPOUSE ARE CONFUSED AS TO WHAT THE APPT IS FOR SINCE NO TEST OR MACHINE WAS EVER ISSUED.     PLEASE CALL [phone]

## 2020-02-26 NOTE — TELEPHONE ENCOUNTER
I HAVE SPOKE WITH MR. KESSLER, THE PATIENT. I EXPLAINED TO HIM THAT THE SLEEP STUDY HAS BEEN DENIED, THAT NENO ORDERED AN APAP, AND THAT NENO RECOMMENDS MR. KESSLER USE IT FOR AT LEAST 30 DAYS. NENO WILL THEN PULL A 30 DAY COMPLIANCE REPORT FROM DME AND, DEPENDING ON RESULTS, MAY REORDER THE IN-LAB REQUEST.    MR. KESSLER EXPRESSED UNDERSTANDING.

## 2020-03-23 ENCOUNTER — TELEPHONE (OUTPATIENT)
Dept: INTERNAL MEDICINE | Facility: CLINIC | Age: 35
End: 2020-03-23

## 2020-04-27 ENCOUNTER — TELEMEDICINE (OUTPATIENT)
Dept: NEUROLOGY | Facility: CLINIC | Age: 35
End: 2020-04-27

## 2020-04-27 VITALS — HEIGHT: 73 IN | WEIGHT: 313 LBS | BODY MASS INDEX: 41.48 KG/M2

## 2020-04-27 DIAGNOSIS — G47.33 OSA (OBSTRUCTIVE SLEEP APNEA): Primary | ICD-10-CM

## 2020-04-27 DIAGNOSIS — E66.01 MORBID OBESITY WITH BMI OF 40.0-44.9, ADULT (HCC): ICD-10-CM

## 2020-04-27 DIAGNOSIS — G47.26 SHIFT WORK SLEEP DISORDER: ICD-10-CM

## 2020-04-27 PROCEDURE — 99441 PR PHYS/QHP TELEPHONE EVALUATION 5-10 MIN: CPT | Performed by: PHYSICIAN ASSISTANT

## 2020-04-27 NOTE — PROGRESS NOTES
You have chosen to receive care through a telephone visit. Do you consent to use a telephone visit for your medical care today? Yes      Neurology Progress Note    TELEMEDICINE ENCOUNTER      Chief Complaint:    Obstructive sleep apnea    Subjective     Subjective:  The patient is encountered today via telephone as he did not have video capability available to him today.  The patient is seen for follow-up of obstructive sleep apnea.  Sleep study on 11/8/2019 demonstrated a composite AHI of 33.7 and he was placed on auto CPAP therapy at a setting of 8-16 cm water.  Current compliance download from 3/25/2020-4/23/2020 demonstrates 97% compliance with an average usage time of 7 hours and 22 minutes.  Current AHI is 4.1 and central events are only 0.4.    The patient's STOP-BANG remains intermediate and current [place] is only 4.  The patient states that he is feeling well, more rested.  His only difficulty is that he has a young child that wakes him up during the night, otherwise, he is feeling much better, much more energized during the day and does not have daytime sleepiness as discussed previously.      Past Medical History:   Diagnosis Date   • Acid reflux    • Hypertension    • Type 2 diabetes mellitus (CMS/[facility])      Past Surgical History:   Procedure Laterality Date   • HAND SURGERY     • HERNIA REPAIR       Family History   Problem Relation Age of Onset   • Diabetes Mother    • Stroke Father         X4   • Heart attack Maternal Grandmother    • Lung cancer Maternal Grandfather      Social History     Tobacco Use   • Smoking status: Never Smoker   • Smokeless tobacco: Former User   Substance Use Topics   • Alcohol use: Yes     Frequency: Monthly or less     Comment: Rarely   • Drug use: No       Medications:  Current Outpatient Medications   Medication Sig Dispense Refill   • amLODIPine (NORVASC) 10 MG tablet Take 1 tablet by mouth Daily. 90 tablet 1   • diphenoxylate-atropine (LOMOTIL) 2.5-0.025 MG per tablet  As Needed.     • Doxylamine Succinate, Sleep, (SLEEP AID PO) Take  by mouth Every Night.     • FLUoxetine (PROzac) 20 MG capsule Take 1 capsule by mouth Daily. 90 capsule 1   • hydroCHLOROthiazide (HYDRODIURIL) 50 MG tablet Take 1 tablet by mouth Daily. 30 tablet 0   • losartan (COZAAR) 100 MG tablet Take 1 tablet by mouth Daily. 30 tablet 5   • metFORMIN (GLUCOPHAGE) 1000 MG tablet Take 1 tablet by mouth 2 (Two) Times a Day With Meals. 180 tablet 3   • ondansetron (ZOFRAN) 4 MG tablet As Needed.       No current facility-administered medications for this visit.        Allergies:    Patient has no known allergies.    Review of Systems:   -A 14 point review of systems is completed and is negative.      Objective      STOP-BANG: Intermediate  [place]: 4    Assessment/Plan     Impression:  1.  Obstructive sleep apnea  2.  Elevated BMI  3.  Diabetes mellitus  4.  Hypertension, essential      Plan:  1.  I reviewed the compliance download with the patient and emphasized the importance of compliance with the prescribed therapy.  He voices understanding with this.  He is 97% compliant.  He will call with any concerns or questions in the interim, however, otherwise, will be seen annually for annual sleep compliance review.    2.  I have recommended instituting regular cardiovascular exercise in the form of walking, biking or swimming 30-40 minutes at a time at least 3-4 times per week.    3.  Counseled on multimodal approach to treatment of sleep apnea to include but not limited to diet, exercise, sleep hygiene, compliance with pap therapy. Encouraged lateral sleeping position and to avoid sedatives or alcohol close to bedtime. Risks of untreated sleep apnea were discussed to include but not limited to HTN, heart disease, stroke, cardiac arrhythmia such as AFIB, and dementia.    5 minutes spent on the phone with the patient today reviewing his compliance download, discussing his therapy, his symptoms and arrange for  follow-up.          IVANIA Oconnor, PAValentinaC, MT([facility])    04/27/20  09:54

## 2020-05-06 DIAGNOSIS — I10 ESSENTIAL HYPERTENSION: ICD-10-CM

## 2020-05-08 RX ORDER — AMLODIPINE BESYLATE 10 MG/1
10 TABLET ORAL DAILY
Qty: 90 TABLET | Refills: 3 | Status: SHIPPED | OUTPATIENT
Start: 2020-05-08 | End: 2021-03-30 | Stop reason: SDUPTHER

## 2020-06-04 DIAGNOSIS — I10 ESSENTIAL HYPERTENSION: ICD-10-CM

## 2020-06-06 RX ORDER — HYDROCHLOROTHIAZIDE 50 MG/1
50 TABLET ORAL DAILY
Qty: 90 TABLET | Refills: 3 | Status: SHIPPED | OUTPATIENT
Start: 2020-06-06 | End: 2021-03-30 | Stop reason: SDUPTHER

## 2020-06-12 DIAGNOSIS — Z56.6 STRESS AT WORK: ICD-10-CM

## 2020-06-12 RX ORDER — FLUOXETINE HYDROCHLORIDE 20 MG/1
20 CAPSULE ORAL DAILY
Qty: 90 CAPSULE | Refills: 3 | Status: SHIPPED | OUTPATIENT
Start: 2020-06-12 | End: 2021-03-30 | Stop reason: SDUPTHER

## 2020-06-12 SDOH — HEALTH STABILITY - MENTAL HEALTH: OTHER PHYSICAL AND MENTAL STRAIN RELATED TO WORK: Z56.6

## 2020-08-05 DIAGNOSIS — I10 ESSENTIAL HYPERTENSION: ICD-10-CM

## 2020-08-06 RX ORDER — LOSARTAN POTASSIUM 100 MG/1
100 TABLET ORAL DAILY
Qty: 90 TABLET | Refills: 3 | Status: SHIPPED | OUTPATIENT
Start: 2020-08-06 | End: 2021-03-30 | Stop reason: SDUPTHER

## 2020-12-23 ENCOUNTER — LAB (OUTPATIENT)
Dept: LAB | Facility: HOSPITAL | Age: 35
End: 2020-12-23

## 2020-12-23 DIAGNOSIS — E11.9 TYPE 2 DIABETES MELLITUS WITHOUT COMPLICATION, WITHOUT LONG-TERM CURRENT USE OF INSULIN (HCC): ICD-10-CM

## 2020-12-23 LAB
ALBUMIN SERPL-MCNC: 4 G/DL (ref 3.5–5)
ALBUMIN/GLOB SERPL: 1.1 G/DL (ref 1.1–2.5)
ALP SERPL-CCNC: 103 U/L (ref 24–120)
ALT SERPL W P-5'-P-CCNC: 78 U/L (ref 0–50)
ANION GAP SERPL CALCULATED.3IONS-SCNC: 9 MMOL/L (ref 4–13)
AST SERPL-CCNC: 52 U/L (ref 7–45)
BILIRUB SERPL-MCNC: 0.6 MG/DL (ref 0.1–1)
BUN SERPL-MCNC: 14 MG/DL (ref 5–21)
BUN/CREAT SERPL: 18.4
CALCIUM SPEC-SCNC: 9.5 MG/DL (ref 8.4–10.4)
CHLORIDE SERPL-SCNC: 100 MMOL/L (ref 98–110)
CHOLEST SERPL-MCNC: 207 MG/DL (ref 130–200)
CO2 SERPL-SCNC: 31 MMOL/L (ref 24–31)
CREAT SERPL-MCNC: 0.76 MG/DL (ref 0.5–1.4)
GFR SERPL CREATININE-BSD FRML MDRD: 115 ML/MIN/1.73
GLOBULIN UR ELPH-MCNC: 3.6 GM/DL
GLUCOSE SERPL-MCNC: 216 MG/DL (ref 70–100)
HBA1C MFR BLD: 8.6 % (ref 4.8–5.9)
HDLC SERPL-MCNC: 44 MG/DL
LDLC SERPL CALC-MCNC: 90 MG/DL (ref 0–99)
LDLC/HDLC SERPL: 1.68 {RATIO}
POTASSIUM SERPL-SCNC: 4.7 MMOL/L (ref 3.5–5.3)
PROT SERPL-MCNC: 7.6 G/DL (ref 6.3–8.7)
SODIUM SERPL-SCNC: 140 MMOL/L (ref 135–145)
TRIGL SERPL-MCNC: 445 MG/DL (ref 0–149)
TSH SERPL DL<=0.05 MIU/L-ACNC: 1.73 UIU/ML (ref 0.27–4.2)
VLDLC SERPL-MCNC: 73 MG/DL (ref 5–40)

## 2020-12-23 PROCEDURE — 80061 LIPID PANEL: CPT

## 2020-12-23 PROCEDURE — 80053 COMPREHEN METABOLIC PANEL: CPT

## 2020-12-23 PROCEDURE — 83036 HEMOGLOBIN GLYCOSYLATED A1C: CPT

## 2020-12-23 PROCEDURE — 36415 COLL VENOUS BLD VENIPUNCTURE: CPT

## 2020-12-23 PROCEDURE — 84443 ASSAY THYROID STIM HORMONE: CPT

## 2020-12-23 RX ORDER — CALCIUM CITRATE/VITAMIN D3 200MG-6.25
TABLET ORAL
Qty: 100 EACH | Refills: 11 | Status: SHIPPED | OUTPATIENT
Start: 2020-12-23

## 2020-12-30 ENCOUNTER — OFFICE VISIT (OUTPATIENT)
Dept: FAMILY MEDICINE CLINIC | Facility: CLINIC | Age: 35
End: 2020-12-30

## 2020-12-30 VITALS
HEIGHT: 73 IN | SYSTOLIC BLOOD PRESSURE: 136 MMHG | WEIGHT: 315 LBS | OXYGEN SATURATION: 99 % | HEART RATE: 106 BPM | RESPIRATION RATE: 18 BRPM | DIASTOLIC BLOOD PRESSURE: 92 MMHG | BODY MASS INDEX: 41.75 KG/M2 | TEMPERATURE: 98.6 F

## 2020-12-30 DIAGNOSIS — E66.01 MORBID OBESITY WITH BMI OF 40.0-44.9, ADULT (HCC): ICD-10-CM

## 2020-12-30 DIAGNOSIS — I10 ESSENTIAL HYPERTENSION: ICD-10-CM

## 2020-12-30 DIAGNOSIS — E11.9 TYPE 2 DIABETES MELLITUS WITHOUT COMPLICATION, WITHOUT LONG-TERM CURRENT USE OF INSULIN (HCC): Primary | ICD-10-CM

## 2020-12-30 DIAGNOSIS — Z56.6 STRESS AT WORK: ICD-10-CM

## 2020-12-30 PROCEDURE — 99214 OFFICE O/P EST MOD 30 MIN: CPT | Performed by: FAMILY MEDICINE

## 2020-12-30 SDOH — HEALTH STABILITY - MENTAL HEALTH: OTHER PHYSICAL AND MENTAL STRAIN RELATED TO WORK: Z56.6

## 2020-12-30 NOTE — PROGRESS NOTES
"CC:   Chief Complaint   Patient presents with   • Follow-up   • Diabetes     a1c- 8.6 12/23/2020       History:  Gagandeep Ordoñez is a 35 y.o. male who presents today for follow-up for evaluation of the above:    Weight increase since last visit with uncontrolled diabetes with A1c of 8.6.  Some polydipsia and polyuria, otherwise feels well, denies increased stress.    ROS:  Review of Systems   Endocrine: Positive for polydipsia and polyuria.       Mr. Ordoñez  reports that he has never smoked. He has quit using smokeless tobacco. He reports current alcohol use. He reports that he does not use drugs.      Current Outpatient Medications:   •  amLODIPine (NORVASC) 10 MG tablet, Take 1 tablet by mouth Daily., Disp: 90 tablet, Rfl: 3  •  diphenoxylate-atropine (LOMOTIL) 2.5-0.025 MG per tablet, As Needed., Disp: , Rfl:   •  Doxylamine Succinate, Sleep, (SLEEP AID PO), Take  by mouth Every Night., Disp: , Rfl:   •  FLUoxetine (PROzac) 20 MG capsule, Take 1 capsule by mouth Daily., Disp: 90 capsule, Rfl: 3  •  hydroCHLOROthiazide (HYDRODIURIL) 50 MG tablet, Take 1 tablet by mouth Daily., Disp: 90 tablet, Rfl: 3  •  losartan (COZAAR) 100 MG tablet, Take 1 tablet by mouth Daily., Disp: 90 tablet, Rfl: 3  •  metFORMIN (GLUCOPHAGE) 1000 MG tablet, Take 1 tablet by mouth 2 (Two) Times a Day With Meals., Disp: 180 tablet, Rfl: 3  •  ondansetron (ZOFRAN) 4 MG tablet, As Needed., Disp: , Rfl:   •  True Metrix Blood Glucose Test test strip, TEST BLOOD SUGAR FOUR TIMES DAILY, Disp: 100 each, Rfl: 11      OBJECTIVE:  /92 (BP Location: Left arm, Patient Position: Sitting, Cuff Size: Adult)   Pulse 106   Temp 98.6 °F (37 °C) (Temporal)   Resp 18   Ht 185.4 cm (73\")   Wt (!) 147 kg (324 lb)   SpO2 99%   BMI 42.75 kg/m²    Physical Exam  Vitals signs and nursing note reviewed.   Constitutional:       General: He is not in acute distress.     Appearance: He is not diaphoretic.   HENT:      Head: Normocephalic and atraumatic.      " Nose: Nose normal.   Eyes:      General: No scleral icterus.        Right eye: No discharge.         Left eye: No discharge.      Conjunctiva/sclera: Conjunctivae normal.   Neck:      Trachea: No tracheal deviation.   Pulmonary:      Effort: Pulmonary effort is normal.   Skin:     General: Skin is warm and dry.      Coloration: Skin is not pale.   Neurological:      Mental Status: He is alert and oriented to person, place, and time.   Psychiatric:         Behavior: Behavior normal.         Thought Content: Thought content normal.         Judgment: Judgment normal.         Assessment/Plan     Diagnosis Plan   1. Type 2 diabetes mellitus without complication, without long-term current use of insulin (CMS/[facility])     2. Essential hypertension     3. Morbid obesity with BMI of 40.0-44.9, adult (CMS/[facility])     4. Stress at work     Add Rybelsus to current regimen  Follow-up 3 months  Home blood pressure monitoring, consider medication addition if greater than 140/90    Mynor Walker D.O.  Family Medicine  Osteopathic Neuromusculoskeletal Medicine

## 2021-01-28 ENCOUNTER — TELEPHONE (OUTPATIENT)
Dept: FAMILY MEDICINE CLINIC | Facility: CLINIC | Age: 36
End: 2021-01-28

## 2021-01-28 DIAGNOSIS — E11.9 TYPE 2 DIABETES MELLITUS WITHOUT COMPLICATION, WITHOUT LONG-TERM CURRENT USE OF INSULIN (HCC): Primary | ICD-10-CM

## 2021-01-28 RX ORDER — ORAL SEMAGLUTIDE 7 MG/1
7 TABLET ORAL DAILY
Qty: 30 TABLET | Refills: 11 | Status: SHIPPED | OUTPATIENT
Start: 2021-01-28 | End: 2021-03-30 | Stop reason: SDUPTHER

## 2021-01-28 NOTE — TELEPHONE ENCOUNTER
Caller: Gagandeep Ordoñez    Relationship: Self    Best call back number: [phone]    What medication are you requesting:   Rebellius- diabetic medication    What are your current symptoms:   Blood sugar fluctuating    How long have you been experiencing symptoms:   Over a month    Have you had these symptoms before:    [x] Yes  [] No    Have you been treated for these symptoms before:   [x] Yes  [] No    If a prescription is needed, what is your preferred pharmacy and phone number:      [place], [place] - [address] [phone]  - [phone]   [phone]    Additional notes:  Patient spouse advised that sample was given and advised that it was 3 mg and advised that it was to be increased to 7mg tablets.

## 2021-01-28 NOTE — TELEPHONE ENCOUNTER
Patient requesting Rx for Rybelsus 7mg, given samples of 3mg at last OV on 12/30/20, upcoming appt scheduled for 3/30/21.

## 2021-03-25 ENCOUNTER — IMMUNIZATION (OUTPATIENT)
Dept: VACCINE CLINIC | Facility: HOSPITAL | Age: 36
End: 2021-03-25

## 2021-03-25 PROCEDURE — 91301 HC SARSCO02 VAC 100MCG/0.5ML IM: CPT | Performed by: OBSTETRICS & GYNECOLOGY

## 2021-03-25 PROCEDURE — 0011A: CPT | Performed by: OBSTETRICS & GYNECOLOGY

## 2021-03-30 ENCOUNTER — OFFICE VISIT (OUTPATIENT)
Dept: FAMILY MEDICINE CLINIC | Facility: CLINIC | Age: 36
End: 2021-03-30

## 2021-03-30 VITALS
DIASTOLIC BLOOD PRESSURE: 88 MMHG | WEIGHT: 315 LBS | HEIGHT: 73 IN | RESPIRATION RATE: 16 BRPM | BODY MASS INDEX: 41.75 KG/M2 | HEART RATE: 88 BPM | SYSTOLIC BLOOD PRESSURE: 138 MMHG | OXYGEN SATURATION: 97 % | TEMPERATURE: 98 F

## 2021-03-30 DIAGNOSIS — I10 ESSENTIAL HYPERTENSION: ICD-10-CM

## 2021-03-30 DIAGNOSIS — E11.9 TYPE 2 DIABETES MELLITUS WITHOUT COMPLICATION, WITHOUT LONG-TERM CURRENT USE OF INSULIN (HCC): Primary | ICD-10-CM

## 2021-03-30 DIAGNOSIS — Z30.09 VASECTOMY EVALUATION: ICD-10-CM

## 2021-03-30 DIAGNOSIS — K21.9 GASTROESOPHAGEAL REFLUX DISEASE, UNSPECIFIED WHETHER ESOPHAGITIS PRESENT: ICD-10-CM

## 2021-03-30 DIAGNOSIS — Z56.6 STRESS AT WORK: ICD-10-CM

## 2021-03-30 LAB — HBA1C MFR BLD: 6.8 %

## 2021-03-30 PROCEDURE — 83036 HEMOGLOBIN GLYCOSYLATED A1C: CPT | Performed by: FAMILY MEDICINE

## 2021-03-30 PROCEDURE — 99214 OFFICE O/P EST MOD 30 MIN: CPT | Performed by: FAMILY MEDICINE

## 2021-03-30 RX ORDER — HYDROCHLOROTHIAZIDE 50 MG/1
50 TABLET ORAL DAILY
Qty: 90 TABLET | Refills: 3 | Status: SHIPPED | OUTPATIENT
Start: 2021-03-30 | End: 2021-06-21

## 2021-03-30 RX ORDER — FLUOXETINE HYDROCHLORIDE 20 MG/1
20 CAPSULE ORAL DAILY
Qty: 90 CAPSULE | Refills: 3 | Status: SHIPPED | OUTPATIENT
Start: 2021-03-30 | End: 2021-07-14

## 2021-03-30 RX ORDER — ORAL SEMAGLUTIDE 7 MG/1
7 TABLET ORAL DAILY
Qty: 30 TABLET | Refills: 11 | Status: SHIPPED | OUTPATIENT
Start: 2021-03-30 | End: 2021-09-28 | Stop reason: SDUPTHER

## 2021-03-30 RX ORDER — LOSARTAN POTASSIUM 100 MG/1
100 TABLET ORAL DAILY
Qty: 90 TABLET | Refills: 3 | Status: SHIPPED | OUTPATIENT
Start: 2021-03-30 | End: 2021-12-29 | Stop reason: SDUPTHER

## 2021-03-30 RX ORDER — OMEPRAZOLE 40 MG/1
40 CAPSULE, DELAYED RELEASE ORAL DAILY
Qty: 30 CAPSULE | Refills: 1 | Status: SHIPPED | OUTPATIENT
Start: 2021-03-30 | End: 2021-08-18

## 2021-03-30 RX ORDER — AMLODIPINE BESYLATE 10 MG/1
10 TABLET ORAL DAILY
Qty: 90 TABLET | Refills: 3 | Status: SHIPPED | OUTPATIENT
Start: 2021-03-30 | End: 2021-12-29 | Stop reason: SDUPTHER

## 2021-03-30 SDOH — HEALTH STABILITY - MENTAL HEALTH: OTHER PHYSICAL AND MENTAL STRAIN RELATED TO WORK: Z56.6

## 2021-04-02 NOTE — PROGRESS NOTES
Subjective    Mr. Ordoñez is 35 y.o. male    Chief Complaint: Vasectomy consult    History of Present Illness  35-year-old male new patient requesting vasectomy for permanent sterilization.  He denies scrotal pain.  They have 2 children and desires no more.    The following portions of the patient's history were reviewed and updated as appropriate: allergies, current medications, past family history, past medical history, past social history, past surgical history and problem list.    Review of Systems   Constitutional: Negative for appetite change and fever.   HENT: Negative for hearing loss and sore throat.    Eyes: Negative for pain and redness.   Respiratory: Negative for cough and shortness of breath.    Cardiovascular: Negative for chest pain and leg swelling.   Gastrointestinal: Negative for anal bleeding, nausea and vomiting.   Endocrine: Negative for cold intolerance and heat intolerance.   Genitourinary: Negative for dysuria, flank pain, hematuria, penile pain, penile swelling, scrotal swelling and testicular pain.   Musculoskeletal: Negative for joint swelling and myalgias.   Skin: Negative for color change and rash.   Allergic/Immunologic: Negative for immunocompromised state.   Neurological: Negative for dizziness and speech difficulty.   Hematological: Negative for adenopathy. Does not bruise/bleed easily.   Psychiatric/Behavioral: Negative for dysphoric mood and suicidal ideas.         Current Outpatient Medications:   •  amLODIPine (NORVASC) 10 MG tablet, Take 1 tablet by mouth Daily., Disp: 90 tablet, Rfl: 3  •  diphenoxylate-atropine (LOMOTIL) 2.5-0.025 MG per tablet, As Needed., Disp: , Rfl:   •  Doxylamine Succinate, Sleep, (SLEEP AID PO), Take  by mouth Every Night., Disp: , Rfl:   •  FLUoxetine (PROzac) 20 MG capsule, Take 1 capsule by mouth Daily., Disp: 90 capsule, Rfl: 3  •  hydroCHLOROthiazide (HYDRODIURIL) 50 MG tablet, Take 1 tablet by mouth Daily., Disp: 90 tablet, Rfl: 3  •  losartan  (COZAAR) 100 MG tablet, Take 1 tablet by mouth Daily., Disp: 90 tablet, Rfl: 3  •  metFORMIN (GLUCOPHAGE) 1000 MG tablet, Take 1 tablet by mouth 2 (Two) Times a Day With Meals., Disp: 180 tablet, Rfl: 3  •  omeprazole (priLOSEC) 40 MG capsule, Take 1 capsule by mouth Daily., Disp: 30 capsule, Rfl: 1  •  ondansetron (ZOFRAN) 4 MG tablet, As Needed., Disp: , Rfl:   •  Semaglutide (Rybelsus) 7 MG tablet, Take 7 mg by mouth Daily., Disp: 30 tablet, Rfl: 11  •  True Metrix Blood Glucose Test test strip, TEST BLOOD SUGAR FOUR TIMES DAILY, Disp: 100 each, Rfl: 11    Past Medical History:   Diagnosis Date   • Acid reflux    • Hypertension    • Type 2 diabetes mellitus (CMS/HCC)        Past Surgical History:   Procedure Laterality Date   • HAND SURGERY     • HERNIA REPAIR         Social History     Socioeconomic History   • Marital status:      Spouse name: Not on file   • Number of children: Not on file   • Years of education: Not on file   • Highest education level: Not on file   Tobacco Use   • Smoking status: Never Smoker   • Smokeless tobacco: Former User   Vaping Use   • Vaping Use: Never used   Substance and Sexual Activity   • Alcohol use: Yes     Comment: Rarely   • Drug use: No   • Sexual activity: Yes     Partners: Female       Family History   Problem Relation Age of Onset   • Diabetes Mother    • Stroke Father         X4   • Heart attack Maternal Grandmother    • Lung cancer Maternal Grandfather        Objective    There were no vitals taken for this visit.    Physical Exam  Testicles are descended bilaterally, no masses.  Vasa are palpable bilaterally.  No inguinal hernia appreciable.      Results for orders placed or performed in visit on 03/30/21   POC Glycosylated Hemoglobin (Hb A1C)    Specimen: Blood   Result Value Ref Range    Hemoglobin A1C 6.8 %     Assessment and Plan    Diagnoses and all orders for this visit:    1. Encounter for other contraceptive management (Primary)  -     naproxen  (Naprosyn) 500 MG tablet; Take 1 tablet by mouth 2 (Two) Times a Day With Meals.  Dispense: 60 tablet; Refill: 0  -     Vasectomy; Future  -     HYDROcodone-acetaminophen (NORCO) 5-325 MG per tablet; Take 1 tablet by mouth Every 6 (Six) Hours As Needed (postop pain).  Dispense: 12 tablet; Refill: 0  -     diazePAM (Valium) 10 MG tablet; 1 tab by mouth 30 minutes prior to vasectomy  Dispense: 1 tablet; Refill: 0      We spent time today discussing elective nature of vasectomy including the risks, benefits, and alternatives.  We discussed risk for infection, bleeding, need for additional procedures, loss of testicle, chronic pain, failure procedure, need to continue back of her current birth control method until sterility is confirmed.  He voiced understanding and provided informed consent to proceed in the clinic next week.      This document has been signed by OLIVER Huerta MD on April 5, 2021 14:50 CDT

## 2021-04-05 ENCOUNTER — OFFICE VISIT (OUTPATIENT)
Dept: UROLOGY | Facility: CLINIC | Age: 36
End: 2021-04-05

## 2021-04-05 VITALS — WEIGHT: 315 LBS | BODY MASS INDEX: 41.75 KG/M2 | TEMPERATURE: 97.8 F | HEIGHT: 73 IN

## 2021-04-05 DIAGNOSIS — Z30.8 ENCOUNTER FOR OTHER CONTRACEPTIVE MANAGEMENT: Primary | ICD-10-CM

## 2021-04-05 PROCEDURE — 99203 OFFICE O/P NEW LOW 30 MIN: CPT | Performed by: UROLOGY

## 2021-04-05 RX ORDER — HYDROCODONE BITARTRATE AND ACETAMINOPHEN 5; 325 MG/1; MG/1
1 TABLET ORAL EVERY 6 HOURS PRN
Qty: 12 TABLET | Refills: 0 | Status: SHIPPED | OUTPATIENT
Start: 2021-04-05 | End: 2021-04-21

## 2021-04-05 RX ORDER — DIAZEPAM 10 MG/1
TABLET ORAL
Qty: 1 TABLET | Refills: 0 | Status: SHIPPED | OUTPATIENT
Start: 2021-04-05 | End: 2021-04-21

## 2021-04-05 RX ORDER — NAPROXEN 500 MG/1
500 TABLET ORAL 2 TIMES DAILY WITH MEALS
Qty: 60 TABLET | Refills: 0 | Status: SHIPPED | OUTPATIENT
Start: 2021-04-05 | End: 2021-06-29

## 2021-04-05 NOTE — PATIENT INSTRUCTIONS
"BMI for Adults  What is BMI?  Body mass index (BMI) is a number that is calculated from a person's weight and height. BMI can help estimate how much of a person's weight is composed of fat. BMI does not measure body fat directly. Rather, it is an alternative to procedures that directly measure body fat, which can be difficult and expensive.  BMI can help identify people who may be at higher risk for certain medical problems.  What are BMI measurements used for?  BMI is used as a screening tool to identify possible weight problems. It helps determine whether a person is obese, overweight, a healthy weight, or underweight.  BMI is useful for:  · Identifying a weight problem that may be related to a medical condition or may increase the risk for medical problems.  · Promoting changes, such as changes in diet and exercise, to help reach a healthy weight. BMI screening can be repeated to see if these changes are working.  How is BMI calculated?  BMI involves measuring your weight in relation to your height. Both height and weight are measured, and the BMI is calculated from those numbers. This can be done either in English (U[place] or metric measurements. Note that charts and online BMI calculators are available to help you find your BMI quickly and easily without having to do these calculations yourself.  To calculate your BMI in English (U.S[place] measurements:    1. Measure your weight in pounds (lb).  2. Multiply the number of pounds by 703.  ? For example, for a person who weighs 180 lb, multiply that number by 703, which equals 126,540.  3. Measure your height in inches. Then multiply that number by itself to get a measurement called \"inches squared.\"  ? For example, for a person who is 70 inches tall, the \"inches squared\" measurement is 70 inches x 70 inches, which equals 4,900 inches squared.  4. Divide the total from step 2 (number of lb x 703) by the total from step 3 (inches squared): 126,540 ÷ 4,900 = 25.8. This is " "your BMI.  To calculate your BMI in metric measurements:  1. Measure your weight in kilograms (kg).  2. Measure your height in meters (m). Then multiply that number by itself to get a measurement called \"meters squared.\"  ? For example, for a person who is 1.75 m tall, the \"meters squared\" measurement is 1.75 m x 1.75 m, which is equal to 3.1 meters squared.  3. Divide the number of kilograms (your weight) by the meters squared number. In this example: 70 ÷ 3.1 = 22.6. This is your BMI.  What do the results mean?  BMI charts are used to identify whether you are underweight, normal weight, overweight, or obese. The following guidelines will be used:  · Underweight: BMI less than 18.5.  · Normal weight: BMI between 18.5 and 24.9.  · Overweight: BMI between 25 and 29.9.  · Obese: BMI of 30 or above.  Keep these notes in mind:  · Weight includes both fat and muscle, so someone with a muscular build, such as an athlete, may have a BMI that is higher than 24.9. In cases like these, BMI is not an accurate measure of body fat.  · To determine if excess body fat is the cause of a BMI of 25 or higher, further assessments may need to be done by a health care provider.  · BMI is usually interpreted in the same way for men and women.  Where to find more information  For more information about BMI, including tools to quickly calculate your BMI, go to these websites:  · Centers for Disease Control and Prevention: www.cdc.gov  · American Heart Association: www.heart.org  · National Heart, Lung, and Blood [place]: www.nhlbi.nih.gov  Summary  · Body mass index (BMI) is a number that is calculated from a person's weight and height.  · BMI may help estimate how much of a person's weight is composed of fat. BMI can help identify those who may be at higher risk for certain medical problems.  · BMI can be measured using English measurements or metric measurements.  · BMI charts are used to identify whether you are underweight, normal " weight, overweight, or obese.  This information is not intended to replace advice given to you by your health care provider. Make sure you discuss any questions you have with your health care provider.  Document Revised: 09/09/2020 Document Reviewed: 07/17/2020  Elsevier Patient Education © 2021 [employer] Inc.

## 2021-04-07 NOTE — PROGRESS NOTES
"Chief Complaint  Follow-up and Diabetes (A1C TODAY)    Subjective          Gagandeep Ordoñez presents to [facility]  History of Present Illness  Here for follow-up of diabetes, A1c today 6.8.  Currently feels well, interested in referral to urology for vasectomy.  Needs refill on amlodipine.  Also reports uncontrolled heartburn    Objective   Vital Signs:   /88 (BP Location: Left arm, Patient Position: Sitting, Cuff Size: Adult)   Pulse 88   Temp 98 °F (36.7 °C) (Temporal)   Resp 16   Ht 185.4 cm (73\")   Wt (!) 145 kg (320 lb 8 oz)   SpO2 97%   BMI 42.28 kg/m²     Physical Exam  Vitals and nursing note reviewed.   Constitutional:       General: He is not in acute distress.     Appearance: He is not diaphoretic.   HENT:      Head: Normocephalic and atraumatic.      Nose: Nose normal.   Eyes:      General: No scleral icterus.        Right eye: No discharge.         Left eye: No discharge.      Conjunctiva/sclera: Conjunctivae normal.   Neck:      Trachea: No tracheal deviation.   Cardiovascular:      Rate and Rhythm: Normal rate and regular rhythm.      Heart sounds: Normal heart sounds. No murmur heard.   No friction rub. No gallop.    Pulmonary:      Effort: Pulmonary effort is normal. No respiratory distress.      Breath sounds: Normal breath sounds. No wheezing or rales.   Skin:     General: Skin is warm and dry.      Coloration: Skin is not pale.   Neurological:      Mental Status: He is alert and oriented to person, place, and time.   Psychiatric:         Behavior: Behavior normal.         Thought Content: Thought content normal.         Judgment: Judgment normal.        Result Review :                 Assessment and Plan    Diagnoses and all orders for this visit:    1. Type 2 diabetes mellitus without complication, without long-term current use of insulin (CMS/[facility]) (Primary)  -     POC Glycosylated Hemoglobin (Hb A1C)  -     metFORMIN (GLUCOPHAGE) 1000 MG tablet; Take 1 " tablet by mouth 2 (Two) Times a Day With Meals.  Dispense: 180 tablet; Refill: 3  -     Semaglutide (Rybelsus) 7 MG tablet; Take 7 mg by mouth Daily.  Dispense: 30 tablet; Refill: 11    2. Essential hypertension  -     amLODIPine (NORVASC) 10 MG tablet; Take 1 tablet by mouth Daily.  Dispense: 90 tablet; Refill: 3  -     losartan (COZAAR) 100 MG tablet; Take 1 tablet by mouth Daily.  Dispense: 90 tablet; Refill: 3  -     hydroCHLOROthiazide (HYDRODIURIL) 50 MG tablet; Take 1 tablet by mouth Daily.  Dispense: 90 tablet; Refill: 3    3. Stress at work  -     FLUoxetine (PROzac) 20 MG capsule; Take 1 capsule by mouth Daily.  Dispense: 90 capsule; Refill: 3    4. Gastroesophageal reflux disease, unspecified whether esophagitis present  -     omeprazole (priLOSEC) 40 MG capsule; Take 1 capsule by mouth Daily.  Dispense: 30 capsule; Refill: 1    5. Vasectomy evaluation  -     Ambulatory Referral to Urology    Continue Metformin and Rybelsus  Mood stable, refill Prozac  Continue amlodipine and HCTZ with addition of losartan  Urology referral  PPI above  Follow-up 3 months

## 2021-04-15 ENCOUNTER — OFFICE VISIT (OUTPATIENT)
Dept: UROLOGY | Facility: CLINIC | Age: 36
End: 2021-04-15

## 2021-04-15 DIAGNOSIS — Z30.2 STERILIZATION: Primary | ICD-10-CM

## 2021-04-15 DIAGNOSIS — Z30.8 ENCOUNTER FOR OTHER CONTRACEPTIVE MANAGEMENT: ICD-10-CM

## 2021-04-15 PROCEDURE — 55250 REMOVAL OF SPERM DUCT(S): CPT | Performed by: UROLOGY

## 2021-04-15 NOTE — PROGRESS NOTES
CC: I am here to have a vasectomy    Vasectomy procedure note  Patient has been premedicated with valium and Norco.  He has done the appropriate shave the day before the procedure.  He is placed in the supine position.  The usual prep and drape with Betadine is carried out.  The vas is palpated on the right side and  from the remainder of the cord bluntly and pulled just underneath the skin.  1% lidocaine is infiltrated in the subcutaneous tissue.  An incision is made directly onto the vas.  The perivasal tissue was bluntly stripped away from the vas deferens freeing an approximate 2 cm segment.  Titanium clips were placed both proximally and distally and the intervening segment excised.  The specimen,is discarded.  The ends are fulgurated with electrocautery as well as any vessels.    The left-sided vasectomy was carried out the same as the right with no change in findings or technique.  The wound was irrigated with sterile saline.  Any subcutaneous vessels that are bleeding are fulgurated.  The skin is closed with a running 3-0 chromic suture.    The patient tolerated this procedure well.  Postoperative instructions were given.  He is reminded that we will need to see  a sample after approximately 20-25 ejaculations to determine whether or not he has had a successful vasectomy.  He is encouraged to use birth control up until that time.    Dez Huerta MD  4/15/2021  09:14 CDT

## 2021-04-16 ENCOUNTER — TELEPHONE (OUTPATIENT)
Dept: UROLOGY | Facility: CLINIC | Age: 36
End: 2021-04-16

## 2021-04-16 NOTE — TELEPHONE ENCOUNTER
Pt wife called and stated that pt was dizzy couldn't get up and move cause he was so dizzy.  She stated that he hadnt taken any pain meds today.  I let her know that the pain meds and would be the only thing that would make him feel like that from the procedure and kelly dher that she may need to contact his family doctor.  No fever when I ask.

## 2021-04-21 ENCOUNTER — OFFICE VISIT (OUTPATIENT)
Dept: FAMILY MEDICINE CLINIC | Facility: CLINIC | Age: 36
End: 2021-04-21

## 2021-04-21 VITALS
WEIGHT: 315 LBS | HEART RATE: 79 BPM | TEMPERATURE: 97.9 F | HEIGHT: 73 IN | BODY MASS INDEX: 41.75 KG/M2 | DIASTOLIC BLOOD PRESSURE: 86 MMHG | SYSTOLIC BLOOD PRESSURE: 145 MMHG | OXYGEN SATURATION: 98 % | RESPIRATION RATE: 16 BRPM

## 2021-04-21 DIAGNOSIS — H81.10 BENIGN PAROXYSMAL POSITIONAL VERTIGO, UNSPECIFIED LATERALITY: Primary | ICD-10-CM

## 2021-04-21 PROCEDURE — 99213 OFFICE O/P EST LOW 20 MIN: CPT | Performed by: FAMILY MEDICINE

## 2021-04-21 NOTE — PATIENT INSTRUCTIONS
How to Perform the Epley Maneuver  The Epley maneuver is an exercise that relieves symptoms of vertigo. Vertigo is the feeling that you or your surroundings are moving when they are not. When you feel vertigo, you may feel like the room is spinning and may have trouble walking. The Epley maneuver is used for a type of vertigo caused by a calcium deposit in a part of the inner ear. The maneuver involves changing head positions to help the deposit move out of the area.  You can do this maneuver at home whenever you have symptoms of vertigo. You can repeat it in 24 hours if your vertigo has not gone away.  Even though the Epley maneuver may relieve your vertigo for a few weeks, it is possible that your symptoms will return. This maneuver relieves vertigo, but it does not relieve dizziness.  What are the risks?  If it is done correctly, the Epley maneuver is considered safe. Sometimes it can lead to dizziness or nausea that goes away after a short time. If you develop other symptoms--such as changes in vision, weakness, or numbness--stop doing the maneuver and call your health care provider.  Supplies needed:  · A bed or table.  · A pillow.  How to do the Epley maneuver         1. Sit on the edge of a bed or table with your back straight and your legs extended or hanging over the edge of the bed or table.  2. Turn your head correction toward the affected ear or side as told by your health care provider.  3. Lie backward quickly with your head turned until you are lying flat on your back. You may want to position a pillow under your shoulders.  4. Hold this position for at least 30 seconds. If you feel dizzy or have symptoms of vertigo, continue to hold the position until the symptoms stop.  5. Turn your head to the opposite direction until your unaffected ear is facing the floor.  6. Hold this position for at least 30 seconds. If you feel dizzy or have symptoms of vertigo, continue to hold the position until the symptoms  stop.  7. Turn your whole body to the same side as your head so that you are positioned on your side. Your head will now be nearly facedown. Hold for at least 30 seconds. If you feel dizzy or have symptoms of vertigo, continue to hold the position until the symptoms stop.  8. Sit back up.  You can repeat the maneuver in 24 hours if your vertigo does not go away.  Follow these instructions at home:  For 24 hours after doing the Epley maneuver:  · Keep your head in an upright position.  · When lying down to sleep or rest, keep your head raised (elevated) with two or more pillows.  · Avoid excessive neck movements.  Activity  · Do not drive or use machinery if you feel dizzy.  · After doing the Epley maneuver, return to your normal activities as told by your health care provider. Ask your health care provider what activities are safe for you.  General instructions  · Drink enough fluid to keep your urine pale yellow.  · Do not drink alcohol.  · Take over-the-counter and prescription medicines only as told by your health care provider.  · Keep all follow-up visits as told by your health care provider. This is important.  Preventing vertigo symptoms  Ask your health care provider if there is anything you should do at home to prevent vertigo. He or she may recommend that you:  · Keep your head elevated with two or more pillows while you sleep.  · Do not sleep on the side of your affected ear.  · Get up slowly from bed.  · Avoid sudden movements during the day.  · Avoid extreme head positions or movement, such as looking up or bending over.  Contact a health care provider if:  · Your vertigo gets worse.  · You have other symptoms, including:  ? Nausea.  ? Vomiting.  ? Headache.  Get help right away if you:  · Have vision changes.  · Have a headache or neck pain that is severe or getting worse.  · Cannot stop vomiting.  · Have new numbness or weakness in any part of your body.  Summary  · Vertigo is the feeling that you or  your surroundings are moving when they are not.  · The Epley maneuver is an exercise that relieves symptoms of vertigo.  · If the Epley maneuver is done correctly, it is considered safe and relieves vertigo quickly.  This information is not intended to replace advice given to you by your health care provider. Make sure you discuss any questions you have with your health care provider.  Document Revised: 10/14/2020 Document Reviewed: 10/14/2020  Elsevier Patient Education © 2021 [employer]

## 2021-04-22 ENCOUNTER — IMMUNIZATION (OUTPATIENT)
Dept: VACCINE CLINIC | Facility: HOSPITAL | Age: 36
End: 2021-04-22

## 2021-04-22 PROCEDURE — 91301 HC SARSCO02 VAC 100MCG/0.5ML IM: CPT | Performed by: OBSTETRICS & GYNECOLOGY

## 2021-04-22 PROCEDURE — 0012A: CPT | Performed by: OBSTETRICS & GYNECOLOGY

## 2021-04-22 NOTE — PROGRESS NOTES
"Chief Complaint  Follow-up and Dizziness (patient had Vasectomy on 4/15/21, said that he still has some swelling and discoloration on Left side with pus-like drainage )    Subjective          Gagandeep Ordoñez presents to [facility]  History of Present Illness  Been having intermittent dizziness since recent vasectomy, BP mildly elevated today, denies using any current narcotics for pain control.     Objective   Vital Signs:   /86 (BP Location: Left arm, Patient Position: Sitting, Cuff Size: Adult)   Pulse 79   Temp 97.9 °F (36.6 °C) (Temporal)   Resp 16   Ht 185.4 cm (73\")   Wt (!) 144 kg (318 lb 8 oz)   SpO2 98%   BMI 42.02 kg/m²     Physical Exam  Vitals and nursing note reviewed.   Constitutional:       General: He is not in acute distress.     Appearance: He is not diaphoretic.   HENT:      Head: Normocephalic and atraumatic.      Nose: Nose normal.   Eyes:      General: No scleral icterus.        Right eye: No discharge.         Left eye: No discharge.      Conjunctiva/sclera: Conjunctivae normal.   Neck:      Trachea: No tracheal deviation.   Pulmonary:      Effort: Pulmonary effort is normal.   Skin:     General: Skin is warm and dry.      Coloration: Skin is not pale.   Neurological:      Mental Status: He is alert and oriented to person, place, and time.      Comments: + gita hallpike   Psychiatric:         Behavior: Behavior normal.         Thought Content: Thought content normal.         Judgment: Judgment normal.        Result Review :                 Assessment and Plan    Diagnoses and all orders for this visit:    1. Benign paroxysmal positional vertigo, unspecified laterality (Primary)    epley maneuver  F/u PRN    "

## 2021-04-26 ENCOUNTER — OFFICE VISIT (OUTPATIENT)
Dept: NEUROLOGY | Facility: CLINIC | Age: 36
End: 2021-04-26

## 2021-04-26 VITALS — HEART RATE: 70 BPM | DIASTOLIC BLOOD PRESSURE: 80 MMHG | SYSTOLIC BLOOD PRESSURE: 130 MMHG

## 2021-04-26 DIAGNOSIS — G47.33 OBSTRUCTIVE SLEEP APNEA ON CPAP: Primary | ICD-10-CM

## 2021-04-26 DIAGNOSIS — E66.01 OBESITY, CLASS III, BMI 40-49.9 (MORBID OBESITY) (HCC): ICD-10-CM

## 2021-04-26 DIAGNOSIS — Z99.89 OBSTRUCTIVE SLEEP APNEA ON CPAP: Primary | ICD-10-CM

## 2021-04-26 PROCEDURE — 99213 OFFICE O/P EST LOW 20 MIN: CPT | Performed by: NURSE PRACTITIONER

## 2021-04-26 NOTE — PROGRESS NOTES
Subjective    Mr. Ordoñez is 35 y.o. male    Chief Complaint: Post Vasectomy Issues      History of Present Illness  Patient had vasectomy 04/15/2021 by Dr. Huerta. He has had minimal swelling and pain.  Patient states he wanted to have incision was checked to make sure they were not infected.  He has not noticed yellowing white discharge approximately 1 incision on the right side.  No appreciable pus drainage from the other side.  No fever or chills.  The following portions of the patient's history were reviewed and updated as appropriate: allergies, current medications, past family history, past medical history, past social history, past surgical history and problem list.    Review of Systems   Constitutional: Negative for appetite change and fever.   HENT: Negative for hearing loss and sore throat.    Eyes: Negative for pain and redness.   Respiratory: Negative for cough and shortness of breath.    Cardiovascular: Negative for chest pain and leg swelling.   Gastrointestinal: Negative for anal bleeding, nausea and vomiting.   Endocrine: Negative for cold intolerance and heat intolerance.   Genitourinary: Negative for dysuria, flank pain, frequency, hematuria, scrotal swelling and testicular pain.   Musculoskeletal: Negative for joint swelling and myalgias.   Skin: Negative for color change and rash.   Allergic/Immunologic: Negative for immunocompromised state.   Neurological: Negative for dizziness and speech difficulty.   Hematological: Negative for adenopathy. Does not bruise/bleed easily.   Psychiatric/Behavioral: Negative for dysphoric mood and suicidal ideas.         Current Outpatient Medications:   •  amLODIPine (NORVASC) 10 MG tablet, Take 1 tablet by mouth Daily., Disp: 90 tablet, Rfl: 3  •  diphenoxylate-atropine (LOMOTIL) 2.5-0.025 MG per tablet, As Needed., Disp: , Rfl:   •  Doxylamine Succinate, Sleep, (SLEEP AID PO), Take  by mouth Every Night., Disp: , Rfl:   •  FLUoxetine (PROzac) 20 MG capsule, Take  "1 capsule by mouth Daily., Disp: 90 capsule, Rfl: 3  •  hydroCHLOROthiazide (HYDRODIURIL) 50 MG tablet, Take 1 tablet by mouth Daily., Disp: 90 tablet, Rfl: 3  •  losartan (COZAAR) 100 MG tablet, Take 1 tablet by mouth Daily., Disp: 90 tablet, Rfl: 3  •  metFORMIN (GLUCOPHAGE) 1000 MG tablet, Take 1 tablet by mouth 2 (Two) Times a Day With Meals., Disp: 180 tablet, Rfl: 3  •  naproxen (Naprosyn) 500 MG tablet, Take 1 tablet by mouth 2 (Two) Times a Day With Meals., Disp: 60 tablet, Rfl: 0  •  omeprazole (priLOSEC) 40 MG capsule, Take 1 capsule by mouth Daily., Disp: 30 capsule, Rfl: 1  •  ondansetron (ZOFRAN) 4 MG tablet, As Needed., Disp: , Rfl:   •  Semaglutide (Rybelsus) 7 MG tablet, Take 7 mg by mouth Daily., Disp: 30 tablet, Rfl: 11  •  True Metrix Blood Glucose Test test strip, TEST BLOOD SUGAR FOUR TIMES DAILY, Disp: 100 each, Rfl: 11    Past Medical History:   Diagnosis Date   • Acid reflux    • Hypertension    • Type 2 diabetes mellitus (CMS/HCC)        Past Surgical History:   Procedure Laterality Date   • HAND SURGERY     • HERNIA REPAIR     • VASECTOMY  04/15/2021       Social History     Socioeconomic History   • Marital status:      Spouse name: Not on file   • Number of children: Not on file   • Years of education: Not on file   • Highest education level: Not on file   Tobacco Use   • Smoking status: Never Smoker   • Smokeless tobacco: Former User   Vaping Use   • Vaping Use: Never used   Substance and Sexual Activity   • Alcohol use: Yes     Comment: Rarely   • Drug use: No   • Sexual activity: Yes     Partners: Female       Family History   Problem Relation Age of Onset   • Diabetes Mother    • Stroke Father         X4   • Heart attack Maternal Grandmother    • Lung cancer Maternal Grandfather        Objective    Temp 97.8 °F (36.6 °C) (Temporal)   Ht 182.9 cm (72\")   Wt (!) 146 kg (322 lb)   BMI 43.67 kg/m²     Physical Exam  Vitals reviewed.   Constitutional:       General: He is not in " acute distress.     Appearance: Normal appearance. He is not ill-appearing.   Genitourinary:     Penis: Normal.       Comments: No obvious bruising swelling or discomfort.  There are vasectomy incisions no evidence of infection purulent drainage or fluid collection underneath the incisions.  Neurological:      Mental Status: He is alert.             Results for orders placed or performed in visit on 04/27/21   POC Urinalysis Dipstick, Multipro    Specimen: Urine   Result Value Ref Range    Color Yellow Yellow, Straw, Dark Yellow, Huma    Clarity, UA Clear Clear    Glucose, UA Negative Negative, 1000 mg/dL (3+) mg/dL    Bilirubin Negative Negative    Ketones, UA Negative Negative    Specific Gravity  1.025 1.005 - 1.030    Blood, UA Negative Negative    pH, Urine 7.0 5.0 - 8.0    Protein, POC Trace (A) Negative mg/dL    Urobilinogen, UA Normal Normal    Nitrite, UA Negative Negative    Leukocytes Negative Negative     Assessment and Plan    Diagnoses and all orders for this visit:    1. Status post vasectomy (Primary)  -     POC Urinalysis Dipstick, Multipro    I inspected his incisions and reassured patient that he does not require oral antibiotic for a few days and cannot apply topical then stop.  Patient will return as instructed to bring in semen specimen.

## 2021-04-26 NOTE — PROGRESS NOTES
Neurology Progress Note      Chief Complaint:    Obstructive sleep apnea    Subjective     Subjective:  Gagandeep Aquino is a 35 year old male who presents to the office today for an annual YVES visit.  He was last seen by Arturo Mitchell PA-C via telemedicine on 4/24/2020.  He has recently underwent a vasecomy but has still been using his CPAP machine.  He presents today with no issues.  He states compliant use with his CPAP machine.  He states every once a while he will get tired from someone working hard or having to get up early with his 1-year-old; however, he reports that he he overall feels like he is well rested and is not excessively tired.  He has no other issues or questions at this time.      Past Medical History:   Diagnosis Date   • Acid reflux    • Hypertension    • Type 2 diabetes mellitus (CMS/HCC)      Past Surgical History:   Procedure Laterality Date   • HAND SURGERY     • HERNIA REPAIR     • VASECTOMY  04/15/2021     Family History   Problem Relation Age of Onset   • Diabetes Mother    • Stroke Father         X4   • Heart attack Maternal Grandmother    • Lung cancer Maternal Grandfather      Social History     Tobacco Use   • Smoking status: Never Smoker   • Smokeless tobacco: Former User   Vaping Use   • Vaping Use: Never used   Substance Use Topics   • Alcohol use: Yes     Comment: Rarely   • Drug use: No     Medications:  Current Outpatient Medications   Medication Sig Dispense Refill   • amLODIPine (NORVASC) 10 MG tablet Take 1 tablet by mouth Daily. 90 tablet 3   • diphenoxylate-atropine (LOMOTIL) 2.5-0.025 MG per tablet As Needed.     • Doxylamine Succinate, Sleep, (SLEEP AID PO) Take  by mouth Every Night.     • FLUoxetine (PROzac) 20 MG capsule Take 1 capsule by mouth Daily. 90 capsule 3   • hydroCHLOROthiazide (HYDRODIURIL) 50 MG tablet Take 1 tablet by mouth Daily. 90 tablet 3   • losartan (COZAAR) 100 MG tablet Take 1 tablet by mouth Daily. 90 tablet 3   • metFORMIN (GLUCOPHAGE) 1000 MG  tablet Take 1 tablet by mouth 2 (Two) Times a Day With Meals. 180 tablet 3   • naproxen (Naprosyn) 500 MG tablet Take 1 tablet by mouth 2 (Two) Times a Day With Meals. 60 tablet 0   • omeprazole (priLOSEC) 40 MG capsule Take 1 capsule by mouth Daily. 30 capsule 1   • ondansetron (ZOFRAN) 4 MG tablet As Needed.     • Semaglutide (Rybelsus) 7 MG tablet Take 7 mg by mouth Daily. 30 tablet 11   • True Metrix Blood Glucose Test test strip TEST BLOOD SUGAR FOUR TIMES DAILY 100 each 11     No current facility-administered medications for this visit.     Current outpatient and discharge medications have been reconciled for the patient.  Reviewed by: KARUNA Muniz      Allergies:    Patient has no known allergies.    Review of Systems:   Review of Systems   Psychiatric/Behavioral: Positive for sleep disturbance.   All other systems reviewed and are negative.        Objective      Vital Signs  Heart Rate:  [70] 70  BP: (130)/(80) 130/80    Physical Exam:  Physical Exam  Constitutional:       Appearance: Normal appearance. He is obese.   HENT:      Head: Normocephalic.   Eyes:      Extraocular Movements: Extraocular movements intact.      Pupils: Pupils are equal, round, and reactive to light.   Cardiovascular:      Rate and Rhythm: Normal rate and regular rhythm.      Pulses: Normal pulses.   Pulmonary:      Effort: Pulmonary effort is normal.   Musculoskeletal:         General: Normal range of motion.      Cervical back: Normal range of motion and neck supple.   Skin:     General: Skin is warm and dry.      Capillary Refill: Capillary refill takes less than 2 seconds.   Neurological:      General: No focal deficit present.      Mental Status: He is alert and oriented to person, place, and time. Mental status is at baseline.      Cranial Nerves: Cranial nerves are intact.      Sensory: Sensation is intact.      Motor: Motor function is intact.      Coordination: Coordination is intact.      Gait: Gait is intact.       Deep Tendon Reflexes: Reflexes are normal and symmetric.   Psychiatric:         Mood and Affect: Mood normal.         Neck Circumference: 20.5 inches  Mallampati Classification: 4       Results Review:      [place] Sleepiness Scale: 7    STOP-BANG: Intermediate risk YVES    Compliance Report:  This report is for the dates 3/27/2021-4/25/2021.  Patient used CPAP device for 20 out of 30 days for 93% compliance of those days device was used for greater than 4 hours for the full 28 days for 93 compliance.  Average usage time was 7 hours 40 minutes per night.  Patient is set on APAP with a minimum pressure of 8 cm H2O and max pressure of 16 cm H2O.  Median pressure usage was 9.2 cm H2O.  Current AHI is 3.3.    Chart Review:  Reviewed historical neurology notes for interval history with YVES treatment and compliance.    Assessment/Plan     Impression:  1. Obstructive sleep apnea  2. Appropriate compliance with CPAP  3. BMI 42      Plan:  1. I have reviewed the current compliance download and findings of the previous polysomnography with the patient in detail.  The patient voices understanding and recognizes the need for adherence to the prescribed therapy.  He he also understands that a certain level of compliance allows for continued insurance coverage as well as adequate treatment of underlying apnea.  He also understands the impact this has upon their overall health status and other medical comorbidities.  2. I have recommended regular cardiovascular exercise in the form of walking, biking or swimming 30-40 minutes at a time at least 3-4 times per week.  3. Counseled on multimodal approach to treatment of sleep apnea to include but not limited to diet, exercise, sleep hygiene, compliance with pap therapy.   4. Encouraged lateral sleeping position and to avoid sedatives or alcohol close to bedtime.   5. Risks of untreated sleep apnea were discussed to include but not limited to HTN, heart disease, stroke, cardiac  arrhythmia such as AFIB, and dementia.  6. Return in about 1 year (around 4/26/2022) for Obstructive sleep apnea follow-up, Annual compliance review.  7. The plan of care was fully discussed with the patient and he is in full agreement at this time.         Toni Abdi, KARUNA  04/26/21  09:50 CDT

## 2021-04-27 ENCOUNTER — OFFICE VISIT (OUTPATIENT)
Dept: UROLOGY | Facility: CLINIC | Age: 36
End: 2021-04-27

## 2021-04-27 VITALS — TEMPERATURE: 97.8 F | BODY MASS INDEX: 42.66 KG/M2 | HEIGHT: 72 IN | WEIGHT: 315 LBS

## 2021-04-27 DIAGNOSIS — Z98.52 STATUS POST VASECTOMY: Primary | ICD-10-CM

## 2021-04-27 LAB
BILIRUB BLD-MCNC: NEGATIVE MG/DL
CLARITY, POC: CLEAR
COLOR UR: YELLOW
GLUCOSE UR STRIP-MCNC: NEGATIVE MG/DL
KETONES UR QL: NEGATIVE
LEUKOCYTE EST, POC: NEGATIVE
NITRITE UR-MCNC: NEGATIVE MG/ML
PH UR: 7 [PH] (ref 5–8)
PROT UR STRIP-MCNC: ABNORMAL MG/DL
RBC # UR STRIP: NEGATIVE /UL
SP GR UR: 1.02 (ref 1–1.03)
UROBILINOGEN UR QL: NORMAL

## 2021-04-27 PROCEDURE — 81003 URINALYSIS AUTO W/O SCOPE: CPT | Performed by: PHYSICIAN ASSISTANT

## 2021-04-27 PROCEDURE — 99213 OFFICE O/P EST LOW 20 MIN: CPT | Performed by: PHYSICIAN ASSISTANT

## 2021-05-10 ENCOUNTER — HOSPITAL ENCOUNTER (OUTPATIENT)
Dept: GENERAL RADIOLOGY | Facility: HOSPITAL | Age: 36
Discharge: HOME | End: 2021-05-10
Admitting: FAMILY MEDICINE

## 2021-05-10 ENCOUNTER — OFFICE VISIT (OUTPATIENT)
Dept: FAMILY MEDICINE CLINIC | Facility: CLINIC | Age: 36
End: 2021-05-10

## 2021-05-10 VITALS
DIASTOLIC BLOOD PRESSURE: 90 MMHG | HEART RATE: 88 BPM | RESPIRATION RATE: 16 BRPM | SYSTOLIC BLOOD PRESSURE: 142 MMHG | TEMPERATURE: 98 F | WEIGHT: 313 LBS | HEIGHT: 72 IN | BODY MASS INDEX: 42.39 KG/M2 | OXYGEN SATURATION: 98 %

## 2021-05-10 DIAGNOSIS — R10.84 GENERALIZED ABDOMINAL PAIN: Primary | ICD-10-CM

## 2021-05-10 DIAGNOSIS — K59.00 CONSTIPATION, UNSPECIFIED CONSTIPATION TYPE: ICD-10-CM

## 2021-05-10 DIAGNOSIS — R10.84 GENERALIZED ABDOMINAL PAIN: ICD-10-CM

## 2021-05-10 PROCEDURE — 99213 OFFICE O/P EST LOW 20 MIN: CPT | Performed by: FAMILY MEDICINE

## 2021-05-10 PROCEDURE — 74018 RADEX ABDOMEN 1 VIEW: CPT

## 2021-05-11 NOTE — TELEPHONE ENCOUNTER
Patient requesting x-ray results.      Also, still with nausea almost out of zofran, requesting refill.

## 2021-05-12 RX ORDER — ONDANSETRON 4 MG/1
4 TABLET, FILM COATED ORAL EVERY 8 HOURS PRN
Qty: 30 TABLET | Refills: 1 | Status: ON HOLD | OUTPATIENT
Start: 2021-05-12 | End: 2021-08-10

## 2021-05-18 NOTE — PROGRESS NOTES
"Chief Complaint  Gas (all symptoms x10 days, patient is concerned for gallbladder issues), Diarrhea, and Nausea    Subjective          Gagandeep Ordoñez presents to [facility]  History of Present Illness  10 days of flatulence, diarrhea, nausea, no fever or chills, no blood in stool. Compliant with PPI    Current Outpatient Medications   Medication Instructions   • amLODIPine (NORVASC) 10 mg, Oral, Daily   • diphenoxylate-atropine (LOMOTIL) 2.5-0.025 MG per tablet As Needed   • Doxylamine Succinate, Sleep, (SLEEP AID PO) Oral, Nightly   • FLUoxetine (PROZAC) 20 mg, Oral, Daily   • hydroCHLOROthiazide (HYDRODIURIL) 50 mg, Oral, Daily   • losartan (COZAAR) 100 mg, Oral, Daily   • metFORMIN (GLUCOPHAGE) 1,000 mg, Oral, 2 Times Daily With Meals   • naproxen (NAPROSYN) 500 mg, Oral, 2 Times Daily With Meals   • omeprazole (PRILOSEC) 40 mg, Oral, Daily   • ondansetron (ZOFRAN) 4 mg, Oral, Every 8 Hours PRN   • Rybelsus 7 mg, Oral, Daily   • True Metrix Blood Glucose Test test strip TEST BLOOD SUGAR FOUR TIMES DAILY       Objective   Vital Signs:   /90 (BP Location: Left arm, Patient Position: Sitting, Cuff Size: Adult)   Pulse 88   Temp 98 °F (36.7 °C) (Temporal)   Resp 16   Ht 182.9 cm (72\")   Wt (!) 142 kg (313 lb)   SpO2 98%   BMI 42.45 kg/m²     Physical Exam  Vitals and nursing note reviewed.   Constitutional:       General: He is not in acute distress.     Appearance: He is not diaphoretic.   HENT:      Head: Normocephalic and atraumatic.      Nose: Nose normal.   Eyes:      General: No scleral icterus.        Right eye: No discharge.         Left eye: No discharge.      Conjunctiva/sclera: Conjunctivae normal.   Neck:      Trachea: No tracheal deviation.   Pulmonary:      Effort: Pulmonary effort is normal.   Abdominal:      General: Bowel sounds are increased.      Palpations: Abdomen is soft. There is no mass.      Tenderness: There is abdominal tenderness in the " epigastric area. There is no guarding or rebound. Negative signs include Van's sign and McBurney's sign.      Comments: Fullness to descending colon   Skin:     General: Skin is warm and dry.      Coloration: Skin is not pale.   Neurological:      Mental Status: He is alert and oriented to person, place, and time.   Psychiatric:         Behavior: Behavior normal.         Thought Content: Thought content normal.         Judgment: Judgment normal.        Result Review :                 Assessment and Plan    Diagnoses and all orders for this visit:    1. Generalized abdominal pain (Primary)  -     XR Abdomen KUB; Future    2. Constipation, unspecified constipation type  -     XR Abdomen KUB; Future    suspect gastroenteritis vs leakage from chronic constipation  XR above  Consider miralax trial  F/u PRN

## 2021-06-21 DIAGNOSIS — I10 ESSENTIAL HYPERTENSION: ICD-10-CM

## 2021-06-22 RX ORDER — HYDROCHLOROTHIAZIDE 50 MG/1
50 TABLET ORAL DAILY
Qty: 90 TABLET | Refills: 3 | Status: SHIPPED | OUTPATIENT
Start: 2021-06-22 | End: 2021-07-14

## 2021-06-29 ENCOUNTER — LAB (OUTPATIENT)
Dept: LAB | Facility: HOSPITAL | Age: 36
End: 2021-06-29

## 2021-06-29 ENCOUNTER — OFFICE VISIT (OUTPATIENT)
Dept: INTERNAL MEDICINE | Facility: CLINIC | Age: 36
End: 2021-06-29

## 2021-06-29 ENCOUNTER — HOSPITAL ENCOUNTER (OUTPATIENT)
Dept: GENERAL RADIOLOGY | Facility: HOSPITAL | Age: 36
Discharge: HOME | End: 2021-06-29

## 2021-06-29 VITALS
BODY MASS INDEX: 42.66 KG/M2 | HEIGHT: 72 IN | OXYGEN SATURATION: 98 % | HEART RATE: 84 BPM | WEIGHT: 315 LBS | DIASTOLIC BLOOD PRESSURE: 80 MMHG | TEMPERATURE: 97.6 F | RESPIRATION RATE: 15 BRPM | SYSTOLIC BLOOD PRESSURE: 138 MMHG

## 2021-06-29 DIAGNOSIS — R74.8 ELEVATED LIVER ENZYMES: ICD-10-CM

## 2021-06-29 DIAGNOSIS — R10.9 ACUTE LEFT FLANK PAIN: ICD-10-CM

## 2021-06-29 DIAGNOSIS — E11.9 TYPE 2 DIABETES MELLITUS WITHOUT COMPLICATION, WITHOUT LONG-TERM CURRENT USE OF INSULIN (HCC): Primary | ICD-10-CM

## 2021-06-29 DIAGNOSIS — I10 ESSENTIAL HYPERTENSION: ICD-10-CM

## 2021-06-29 LAB
ALBUMIN SERPL-MCNC: 4.3 G/DL (ref 3.5–5.2)
ALBUMIN/GLOB SERPL: 1.3 G/DL
ALP SERPL-CCNC: 102 U/L (ref 39–117)
ALT SERPL W P-5'-P-CCNC: 47 U/L (ref 1–41)
ANION GAP SERPL CALCULATED.3IONS-SCNC: 14 MMOL/L (ref 5–15)
AST SERPL-CCNC: 32 U/L (ref 1–40)
BILIRUB SERPL-MCNC: 0.3 MG/DL (ref 0–1.2)
BILIRUB UR QL STRIP: NEGATIVE
BUN SERPL-MCNC: 14 MG/DL (ref 6–20)
BUN/CREAT SERPL: 19.2 (ref 7–25)
CALCIUM SPEC-SCNC: 9.6 MG/DL (ref 8.6–10.5)
CHLORIDE SERPL-SCNC: 97 MMOL/L (ref 98–107)
CLARITY UR: CLEAR
CO2 SERPL-SCNC: 27 MMOL/L (ref 22–29)
COLOR UR: YELLOW
CREAT SERPL-MCNC: 0.73 MG/DL (ref 0.76–1.27)
GFR SERPL CREATININE-BSD FRML MDRD: 121 ML/MIN/1.73
GLOBULIN UR ELPH-MCNC: 3.2 GM/DL
GLUCOSE SERPL-MCNC: 136 MG/DL (ref 65–99)
GLUCOSE UR STRIP-MCNC: NEGATIVE MG/DL
HGB UR QL STRIP.AUTO: NEGATIVE
KETONES UR QL STRIP: ABNORMAL
LEUKOCYTE ESTERASE UR QL STRIP.AUTO: NEGATIVE
NITRITE UR QL STRIP: NEGATIVE
PH UR STRIP.AUTO: <=5 [PH] (ref 5–8)
POTASSIUM SERPL-SCNC: 3.9 MMOL/L (ref 3.5–5.2)
PROT SERPL-MCNC: 7.5 G/DL (ref 6–8.5)
PROT UR QL STRIP: NEGATIVE
SODIUM SERPL-SCNC: 138 MMOL/L (ref 136–145)
SP GR UR STRIP: 1.02 (ref 1–1.03)
UROBILINOGEN UR QL STRIP: ABNORMAL

## 2021-06-29 PROCEDURE — 80053 COMPREHEN METABOLIC PANEL: CPT

## 2021-06-29 PROCEDURE — 81003 URINALYSIS AUTO W/O SCOPE: CPT

## 2021-06-29 PROCEDURE — 74018 RADEX ABDOMEN 1 VIEW: CPT

## 2021-06-29 PROCEDURE — 36415 COLL VENOUS BLD VENIPUNCTURE: CPT

## 2021-06-29 PROCEDURE — 99214 OFFICE O/P EST MOD 30 MIN: CPT | Performed by: INTERNAL MEDICINE

## 2021-06-29 NOTE — PROGRESS NOTES
"Chief Complaint   Patient presents with   • Establish Care   • Gallbladder     Had an attack last week         History:  Gagandeep Ordoñez is a 35 y.o. male who presents today for evaluation of the above problems.    He presents today to establish care.  He is a previous patient of Dr. Walker.  He has type 2 diabetes and is currently on Metformin, and Rybelsus.  He has hypertension and is on losartan 100 mg daily, Norvasc 10 mg daily, and hydrochlorothiazide 50 mg daily.    This past Saturday he had an acute onset of left flank pain radiating down to his left lower quadrant.  He believes [place] and [employer] food precipitated the event and is worried about his gallbladder.  The episode lasted about 4 to 5 hours.  He felt like he was being stabbed and could not get comfortable.  He did vomit and felt improved.  Denies any right upper quadrant tenderness.    He saw Dr. Walker on May 10, 2021.  This note was reviewed.  He was suspected he had gastroenteritis at that time and was prescribed Prilosec.  The belching and \"sulfur gas\" has improved with Prilosec but still remains.  KUB at the time showed retained food in the stomach but otherwise was unremarkable.  Symptoms started prior to initiation of Rybelsus    He tries not to eat much salt but does report that it is his weakness.    He is compliant with CPAP nightly for his obstructive sleep apnea    Has not used any naproxen after his vasectomy    He has had a COVID-19 vaccine      HPI    ROS:  Review of Systems   Constitutional: Positive for activity change and appetite change. Negative for fatigue and fever.   Respiratory: Negative.    Cardiovascular: Negative.    Gastrointestinal: Positive for abdominal distention, abdominal pain and vomiting.   Genitourinary: Positive for flank pain (Left). Negative for difficulty urinating, dysuria, frequency and hematuria.         Current Outpatient Medications:   •  amLODIPine (NORVASC) 10 MG tablet, Take 1 tablet by mouth Daily., " Disp: 90 tablet, Rfl: 3  •  diphenoxylate-atropine (LOMOTIL) 2.5-0.025 MG per tablet, As Needed., Disp: , Rfl:   •  Doxylamine Succinate, Sleep, (SLEEP AID PO), Take  by mouth Every Night., Disp: , Rfl:   •  FLUoxetine (PROzac) 20 MG capsule, Take 1 capsule by mouth Daily., Disp: 90 capsule, Rfl: 3  •  hydroCHLOROthiazide (HYDRODIURIL) 50 MG tablet, Take 1 tablet by mouth Daily., Disp: 90 tablet, Rfl: 3  •  losartan (COZAAR) 100 MG tablet, Take 1 tablet by mouth Daily., Disp: 90 tablet, Rfl: 3  •  metFORMIN (GLUCOPHAGE) 1000 MG tablet, Take 1 tablet by mouth 2 (Two) Times a Day With Meals., Disp: 180 tablet, Rfl: 3  •  omeprazole (priLOSEC) 40 MG capsule, Take 1 capsule by mouth Daily., Disp: 30 capsule, Rfl: 1  •  ondansetron (ZOFRAN) 4 MG tablet, Take 1 tablet by mouth Every 8 (Eight) Hours As Needed for Nausea., Disp: 30 tablet, Rfl: 1  •  Semaglutide (Rybelsus) 7 MG tablet, Take 7 mg by mouth Daily., Disp: 30 tablet, Rfl: 11  •  True Metrix Blood Glucose Test test strip, TEST BLOOD SUGAR FOUR TIMES DAILY, Disp: 100 each, Rfl: 11    Lab Results   Component Value Date    GLUCOSE 216 (H) 12/23/2020    BUN 14 12/23/2020    CREATININE 0.76 12/23/2020    EGFRIFNONA 115 12/23/2020    BCR 18.4 12/23/2020    K 4.7 12/23/2020    CO2 31.0 12/23/2020    CALCIUM 9.5 12/23/2020    ALBUMIN 4.00 12/23/2020    AST 52 (H) 12/23/2020    ALT 78 (H) 12/23/2020       WBC   Date Value Ref Range Status   02/20/2019 10.3 4.8 - 10.8 K/uL Final     RBC   Date Value Ref Range Status   02/20/2019 5.25 4.70 - 6.10 M/uL Final     Hemoglobin   Date Value Ref Range Status   02/20/2019 15.1 14.0 - 18.0 g/dL Final     Hematocrit   Date Value Ref Range Status   02/20/2019 45.6 42.0 - 52.0 % Final     MCV   Date Value Ref Range Status   02/20/2019 86.9 80.0 - 94.0 fL Final     MCH   Date Value Ref Range Status   02/20/2019 28.8 27.0 - 31.0 pg Final     MCHC   Date Value Ref Range Status   02/20/2019 33.1 33.0 - 37.0 g/dL Final     RDW   Date Value  "Ref Range Status   02/20/2019 12.9 11.5 - 14.5 % Final     MPV   Date Value Ref Range Status   02/20/2019 11.2 9.4 - 12.4 fL Final     Platelets   Date Value Ref Range Status   02/20/2019 292 130 - 400 K/uL Final     Neutrophil Rel %   Date Value Ref Range Status   02/20/2019 56.4 50.0 - 65.0 % Final     Lymphocyte Rel %   Date Value Ref Range Status   02/20/2019 33.2 20.0 - 40.0 % Final     Monocyte Rel %   Date Value Ref Range Status   02/20/2019 7.9 0.0 - 10.0 % Final     Eosinophil Rel %   Date Value Ref Range Status   02/20/2019 1.4 0.0 - 5.0 % Final     Basophil Rel %   Date Value Ref Range Status   02/20/2019 0.7 0.0 - 1.0 % Final     Neutrophils Absolute   Date Value Ref Range Status   02/20/2019 5.8 1.5 - 7.5 K/uL Final     Lymphocytes Absolute   Date Value Ref Range Status   02/20/2019 3.4 1.1 - 4.5 K/uL Final     Monocytes Absolute   Date Value Ref Range Status   02/20/2019 0.80 0.00 - 0.90 K/uL Final     Eosinophils Absolute   Date Value Ref Range Status   02/20/2019 0.10 0.00 - 0.60 K/uL Final     Basophils Absolute   Date Value Ref Range Status   02/20/2019 0.10 0.00 - 0.20 K/uL Final         OBJECTIVE:  Visit Vitals  /80 (BP Location: Left arm, Patient Position: Sitting, Cuff Size: Adult)   Pulse 84   Temp 97.6 °F (36.4 °C) (Oral)   Resp 15   Ht 182.9 cm (72.01\")   Wt (!) 147 kg (323 lb)   SpO2 98%   BMI 43.80 kg/m²      Physical Exam  Constitutional:       General: He is not in acute distress.     Appearance: He is well-developed.   HENT:      Head: Normocephalic and atraumatic.   Eyes:      General: No scleral icterus.     Pupils: Pupils are equal, round, and reactive to light.   Neck:      Thyroid: No thyromegaly.      Trachea: Phonation normal.   Cardiovascular:      Rate and Rhythm: Normal rate and regular rhythm.      Heart sounds: No murmur heard.     Pulmonary:      Effort: Pulmonary effort is normal. No respiratory distress.      Breath sounds: Normal breath sounds. No wheezing or " rales.   Abdominal:      General: Abdomen is flat. Bowel sounds are normal. There is no distension.      Palpations: There is no mass.      Tenderness: There is no abdominal tenderness. There is left CVA tenderness (Mild on deep palpation). There is no right CVA tenderness or guarding.      Hernia: No hernia is present.   Skin:     Coloration: Skin is not jaundiced or pale.      Findings: No erythema.   Neurological:      Mental Status: He is alert.   Psychiatric:         Behavior: Behavior normal.         Thought Content: Thought content normal.         Judgment: Judgment normal.         Assessment/Plan    Diagnoses and all orders for this visit:    1. Type 2 diabetes mellitus without complication, without long-term current use of insulin (CMS/HCC) (Primary)    2. Essential hypertension  -     Comprehensive metabolic panel; Future    3. Elevated liver enzymes  -     Comprehensive metabolic panel; Future  -     US Liver; Future    4. Acute left flank pain  -     Urinalysis With Culture If Indicated -; Future  -     XR Abdomen KUB; Future      Will continue his current regimen for type 2 diabetes including Metformin 1000 g twice a day, and Rybelsus 7 mg daily.    He has hypertension and is maxed out on 3 medications including losartan Norvasc and hydrochlorothiazide.    At this time unclear etiology of the left flank and left lower quadrant abdominal pain.  I suspect nephrolithiasis.  Check urinalysis and KUB today.  In the past his liver enzymes have been elevated will check a CMP and PT today as well as ultrasound of his liver/gallbladder.  I do not believe his current symptoms are related to gallbladder etiology.  It is possible he has constipation, or possibly a side effect from Rybelsus from its effect of decreased gastric emptying    Follow-up 6 months or sooner if indicated.  Further work-up and management based on the above results.    Return in about 6 months (around 12/29/2021) for Recheck with  A1c.    Patient was given instructions and counseling regarding his/her condition or for health maintenance advice. Please see specific information pulled into the AVS if appropriate.     CHARMAINE Sanchez MD   13:43 CDT  6/29/2021

## 2021-07-02 ENCOUNTER — APPOINTMENT (OUTPATIENT)
Dept: ULTRASOUND IMAGING | Facility: HOSPITAL | Age: 36
End: 2021-07-02

## 2021-07-07 ENCOUNTER — HOSPITAL ENCOUNTER (OUTPATIENT)
Dept: ULTRASOUND IMAGING | Facility: HOSPITAL | Age: 36
Discharge: HOME | End: 2021-07-07
Admitting: INTERNAL MEDICINE

## 2021-07-07 ENCOUNTER — APPOINTMENT (OUTPATIENT)
Dept: ULTRASOUND IMAGING | Facility: HOSPITAL | Age: 36
End: 2021-07-07

## 2021-07-07 DIAGNOSIS — R10.84 GENERALIZED ABDOMINAL PAIN: Primary | ICD-10-CM

## 2021-07-07 DIAGNOSIS — R74.8 ELEVATED LIVER ENZYMES: ICD-10-CM

## 2021-07-07 PROCEDURE — 76705 ECHO EXAM OF ABDOMEN: CPT

## 2021-07-13 DIAGNOSIS — I10 ESSENTIAL HYPERTENSION: ICD-10-CM

## 2021-07-13 DIAGNOSIS — Z56.6 STRESS AT WORK: ICD-10-CM

## 2021-07-13 SDOH — HEALTH STABILITY - MENTAL HEALTH: OTHER PHYSICAL AND MENTAL STRAIN RELATED TO WORK: Z56.6

## 2021-07-14 RX ORDER — FLUOXETINE HYDROCHLORIDE 20 MG/1
CAPSULE ORAL
Qty: 30 CAPSULE | Refills: 5 | Status: SHIPPED | OUTPATIENT
Start: 2021-07-14 | End: 2021-12-29 | Stop reason: SDUPTHER

## 2021-07-14 RX ORDER — HYDROCHLOROTHIAZIDE 50 MG/1
TABLET ORAL
Qty: 30 TABLET | Refills: 5 | Status: SHIPPED | OUTPATIENT
Start: 2021-07-14 | End: 2021-12-29 | Stop reason: SDUPTHER

## 2021-07-15 ENCOUNTER — HOSPITAL ENCOUNTER (OUTPATIENT)
Dept: NUCLEAR MEDICINE | Facility: HOSPITAL | Age: 36
Discharge: HOME | End: 2021-07-15

## 2021-07-15 DIAGNOSIS — K82.8 DYSFUNCTIONAL GALLBLADDER: Primary | ICD-10-CM

## 2021-07-15 DIAGNOSIS — R10.84 GENERALIZED ABDOMINAL PAIN: ICD-10-CM

## 2021-07-15 PROCEDURE — 78226 HEPATOBILIARY SYSTEM IMAGING: CPT

## 2021-07-15 PROCEDURE — 0 TECHNETIUM TC 99M MEBROFENIN KIT: Performed by: INTERNAL MEDICINE

## 2021-07-15 PROCEDURE — A9537 TC99M MEBROFENIN: HCPCS | Performed by: INTERNAL MEDICINE

## 2021-07-15 RX ORDER — KIT FOR THE PREPARATION OF TECHNETIUM TC 99M MEBROFENIN 45 MG/10ML
1 INJECTION, POWDER, LYOPHILIZED, FOR SOLUTION INTRAVENOUS
Status: COMPLETED | OUTPATIENT
Start: 2021-07-15 | End: 2021-07-15

## 2021-07-15 RX ADMIN — MEBROFENIN 1 DOSE: 45 INJECTION, POWDER, LYOPHILIZED, FOR SOLUTION INTRAVENOUS at 13:18

## 2021-07-26 ENCOUNTER — TRANSCRIBE ORDERS (OUTPATIENT)
Dept: LAB | Facility: HOSPITAL | Age: 36
End: 2021-07-26

## 2021-07-26 DIAGNOSIS — Z11.59 SCREENING FOR VIRAL DISEASE: Primary | ICD-10-CM

## 2021-07-28 ENCOUNTER — PRE-ADMISSION TESTING (OUTPATIENT)
Dept: PREADMISSION TESTING | Facility: HOSPITAL | Age: 36
End: 2021-07-28

## 2021-07-28 VITALS
HEIGHT: 71 IN | HEART RATE: 96 BPM | RESPIRATION RATE: 20 BRPM | DIASTOLIC BLOOD PRESSURE: 79 MMHG | OXYGEN SATURATION: 96 % | BODY MASS INDEX: 44.1 KG/M2 | SYSTOLIC BLOOD PRESSURE: 130 MMHG | WEIGHT: 315 LBS

## 2021-07-28 LAB
ALBUMIN SERPL-MCNC: 4.3 G/DL (ref 3.5–5.2)
ALBUMIN/GLOB SERPL: 1.7 G/DL
ALP SERPL-CCNC: 99 U/L (ref 39–117)
ALT SERPL W P-5'-P-CCNC: 41 U/L (ref 1–41)
ANION GAP SERPL CALCULATED.3IONS-SCNC: 11 MMOL/L (ref 5–15)
AST SERPL-CCNC: 26 U/L (ref 1–40)
BASOPHILS # BLD AUTO: 0.06 10*3/MM3 (ref 0–0.2)
BASOPHILS NFR BLD AUTO: 0.9 % (ref 0–1.5)
BILIRUB SERPL-MCNC: 0.3 MG/DL (ref 0–1.2)
BUN SERPL-MCNC: 15 MG/DL (ref 6–20)
BUN/CREAT SERPL: 23.4 (ref 7–25)
CALCIUM SPEC-SCNC: 9.3 MG/DL (ref 8.6–10.5)
CHLORIDE SERPL-SCNC: 102 MMOL/L (ref 98–107)
CO2 SERPL-SCNC: 26 MMOL/L (ref 22–29)
CREAT SERPL-MCNC: 0.64 MG/DL (ref 0.76–1.27)
DEPRECATED RDW RBC AUTO: 39.2 FL (ref 37–54)
EOSINOPHIL # BLD AUTO: 0.13 10*3/MM3 (ref 0–0.4)
EOSINOPHIL NFR BLD AUTO: 2.1 % (ref 0.3–6.2)
ERYTHROCYTE [DISTWIDTH] IN BLOOD BY AUTOMATED COUNT: 12.9 % (ref 12.3–15.4)
GFR SERPL CREATININE-BSD FRML MDRD: 141 ML/MIN/1.73
GLOBULIN UR ELPH-MCNC: 2.6 GM/DL
GLUCOSE SERPL-MCNC: 189 MG/DL (ref 65–99)
HCT VFR BLD AUTO: 43.1 % (ref 37.5–51)
HGB BLD-MCNC: 14.4 G/DL (ref 13–17.7)
IMM GRANULOCYTES # BLD AUTO: 0.02 10*3/MM3 (ref 0–0.05)
IMM GRANULOCYTES NFR BLD AUTO: 0.3 % (ref 0–0.5)
LYMPHOCYTES # BLD AUTO: 2.72 10*3/MM3 (ref 0.7–3.1)
LYMPHOCYTES NFR BLD AUTO: 42.9 % (ref 19.6–45.3)
MCH RBC QN AUTO: 27.9 PG (ref 26.6–33)
MCHC RBC AUTO-ENTMCNC: 33.4 G/DL (ref 31.5–35.7)
MCV RBC AUTO: 83.4 FL (ref 79–97)
MONOCYTES # BLD AUTO: 0.59 10*3/MM3 (ref 0.1–0.9)
MONOCYTES NFR BLD AUTO: 9.3 % (ref 5–12)
NEUTROPHILS NFR BLD AUTO: 2.82 10*3/MM3 (ref 1.7–7)
NEUTROPHILS NFR BLD AUTO: 44.5 % (ref 42.7–76)
NRBC BLD AUTO-RTO: 0 /100 WBC (ref 0–0.2)
PLATELET # BLD AUTO: 264 10*3/MM3 (ref 140–450)
PMV BLD AUTO: 11.8 FL (ref 6–12)
POTASSIUM SERPL-SCNC: 3.6 MMOL/L (ref 3.5–5.2)
PROT SERPL-MCNC: 6.9 G/DL (ref 6–8.5)
RBC # BLD AUTO: 5.17 10*6/MM3 (ref 4.14–5.8)
SODIUM SERPL-SCNC: 139 MMOL/L (ref 136–145)
WBC # BLD AUTO: 6.34 10*3/MM3 (ref 3.4–10.8)

## 2021-07-28 PROCEDURE — 36415 COLL VENOUS BLD VENIPUNCTURE: CPT

## 2021-07-28 PROCEDURE — 85025 COMPLETE CBC W/AUTO DIFF WBC: CPT

## 2021-07-28 PROCEDURE — 80053 COMPREHEN METABOLIC PANEL: CPT

## 2021-07-28 PROCEDURE — 93005 ELECTROCARDIOGRAM TRACING: CPT

## 2021-07-28 PROCEDURE — 93010 ELECTROCARDIOGRAM REPORT: CPT | Performed by: INTERNAL MEDICINE

## 2021-07-29 LAB
QT INTERVAL: 366 MS
QTC INTERVAL: 408 MS

## 2021-07-31 ENCOUNTER — LAB (OUTPATIENT)
Dept: LAB | Facility: HOSPITAL | Age: 36
End: 2021-07-31

## 2021-07-31 LAB — SARS-COV-2 ORF1AB RESP QL NAA+PROBE: NOT DETECTED

## 2021-07-31 PROCEDURE — C9803 HOPD COVID-19 SPEC COLLECT: HCPCS | Performed by: STUDENT IN AN ORGANIZED HEALTH CARE EDUCATION/TRAINING PROGRAM

## 2021-07-31 PROCEDURE — U0004 COV-19 TEST NON-CDC HGH THRU: HCPCS | Performed by: STUDENT IN AN ORGANIZED HEALTH CARE EDUCATION/TRAINING PROGRAM

## 2021-08-02 ENCOUNTER — TELEPHONE (OUTPATIENT)
Dept: INTERNAL MEDICINE | Facility: CLINIC | Age: 36
End: 2021-08-02

## 2021-08-02 DIAGNOSIS — R94.31 ABNORMAL EKG: ICD-10-CM

## 2021-08-02 DIAGNOSIS — Z01.818 PRE-OP TESTING: Primary | ICD-10-CM

## 2021-08-02 NOTE — TELEPHONE ENCOUNTER
Spoke with patient he stated that he has not had any chest pains, SOB, palpitations. He has not had a heart attack nor a stroke that he is aware of. He stated that he has had stress tests in the path.

## 2021-08-02 NOTE — TELEPHONE ENCOUNTER
Spoke with patient he stated that it has been atleast 4 years ago. He stated they have all been normal.

## 2021-08-02 NOTE — TELEPHONE ENCOUNTER
----- Message from HENRIETTA Sanchez MD sent at 8/2/2021 10:08 AM CDT -----  Would you mind calling him and seeing if he has any chest pain, shortness of breath, palpitations or other heart symptoms?  Also, has he ever had a heart attack or a stroke?  Dr. Handy obtained an EKG and showed some T wave abnormalities which could be consistent with decreased blood flow to his heart.  We may need to get a stress test prior to his surgery tomorrow.

## 2021-08-03 ENCOUNTER — TRANSCRIBE ORDERS (OUTPATIENT)
Dept: LAB | Facility: HOSPITAL | Age: 36
End: 2021-08-03

## 2021-08-03 ENCOUNTER — OFFICE VISIT (OUTPATIENT)
Dept: INTERNAL MEDICINE | Facility: CLINIC | Age: 36
End: 2021-08-03

## 2021-08-03 ENCOUNTER — TELEPHONE (OUTPATIENT)
Dept: INTERNAL MEDICINE | Facility: CLINIC | Age: 36
End: 2021-08-03

## 2021-08-03 VITALS
SYSTOLIC BLOOD PRESSURE: 132 MMHG | BODY MASS INDEX: 42.66 KG/M2 | HEART RATE: 93 BPM | TEMPERATURE: 98.4 F | WEIGHT: 315 LBS | DIASTOLIC BLOOD PRESSURE: 72 MMHG | HEIGHT: 72 IN | OXYGEN SATURATION: 96 % | RESPIRATION RATE: 16 BRPM

## 2021-08-03 DIAGNOSIS — Z01.818 PREOPERATIVE TESTING: Primary | ICD-10-CM

## 2021-08-03 DIAGNOSIS — K76.0 HEPATIC STEATOSIS: ICD-10-CM

## 2021-08-03 DIAGNOSIS — K82.8 DYSFUNCTIONAL GALLBLADDER: Primary | ICD-10-CM

## 2021-08-03 PROCEDURE — 99213 OFFICE O/P EST LOW 20 MIN: CPT | Performed by: INTERNAL MEDICINE

## 2021-08-03 NOTE — PROGRESS NOTES
Chief Complaint   Patient presents with   • Office Visit   • Discussion about gallbladder         History:  Gagandeep Ordoñez is a 35 y.o. male who presents today for evaluation of the above problems.      He presented to the office today to discuss his upcoming cholecystectomy, and abnormal EKG.  He is already been set up for a stress echocardiogram on August 5.  He has no cardiac symptoms to speak of and has had 3-4 negative stress test in the past the most recent one being 4 years ago.  The EKG was reviewed again today showing lateral T wave inversions.  He denies ever having a heart attack and again is not having any cardiac symptoms.    He would also like a referral to a dietitian to help him with diet both before and after his cholecystectomy.    If the symptoms continue after his cholecystectomy we will address his Rybelsus    HPI    ROS:  Review of Systems      Current Outpatient Medications:   •  amLODIPine (NORVASC) 10 MG tablet, Take 1 tablet by mouth Daily., Disp: 90 tablet, Rfl: 3  •  diphenoxylate-atropine (LOMOTIL) 2.5-0.025 MG per tablet, Take 1 tablet by mouth As Needed., Disp: , Rfl:   •  FLUoxetine (PROzac) 20 MG capsule, TAKE 1 CAPSULE BY MOUTH ONCE DAILY, Disp: 30 capsule, Rfl: 5  •  hydroCHLOROthiazide (HYDRODIURIL) 50 MG tablet, TAKE 1 TABLET BY MOUTH ONCE DAILY, Disp: 30 tablet, Rfl: 5  •  losartan (COZAAR) 100 MG tablet, Take 1 tablet by mouth Daily., Disp: 90 tablet, Rfl: 3  •  metFORMIN (GLUCOPHAGE) 1000 MG tablet, Take 1 tablet by mouth 2 (Two) Times a Day With Meals., Disp: 180 tablet, Rfl: 3  •  omeprazole (priLOSEC) 40 MG capsule, Take 1 capsule by mouth Daily., Disp: 30 capsule, Rfl: 1  •  ondansetron (ZOFRAN) 4 MG tablet, Take 1 tablet by mouth Every 8 (Eight) Hours As Needed for Nausea., Disp: 30 tablet, Rfl: 1  •  Semaglutide (Rybelsus) 7 MG tablet, Take 7 mg by mouth Daily., Disp: 30 tablet, Rfl: 11  •  True Metrix Blood Glucose Test test strip, TEST BLOOD SUGAR FOUR TIMES DAILY,  Disp: 100 each, Rfl: 11    Lab Results   Component Value Date    GLUCOSE 189 (H) 07/28/2021    BUN 15 07/28/2021    CREATININE 0.64 (L) 07/28/2021    EGFRIFNONA 141 07/28/2021    BCR 23.4 07/28/2021    K 3.6 07/28/2021    CO2 26.0 07/28/2021    CALCIUM 9.3 07/28/2021    ALBUMIN 4.30 07/28/2021    AST 26 07/28/2021    ALT 41 07/28/2021       WBC   Date Value Ref Range Status   07/28/2021 6.34 3.40 - 10.80 10*3/mm3 Final   02/20/2019 10.3 4.8 - 10.8 K/uL Final     RBC   Date Value Ref Range Status   07/28/2021 5.17 4.14 - 5.80 10*6/mm3 Final   02/20/2019 5.25 4.70 - 6.10 M/uL Final     Hemoglobin   Date Value Ref Range Status   07/28/2021 14.4 13.0 - 17.7 g/dL Final   02/20/2019 15.1 14.0 - 18.0 g/dL Final     Hematocrit   Date Value Ref Range Status   07/28/2021 43.1 37.5 - 51.0 % Final   02/20/2019 45.6 42.0 - 52.0 % Final     MCV   Date Value Ref Range Status   07/28/2021 83.4 79.0 - 97.0 fL Final   02/20/2019 86.9 80.0 - 94.0 fL Final     MCH   Date Value Ref Range Status   07/28/2021 27.9 26.6 - 33.0 pg Final   02/20/2019 28.8 27.0 - 31.0 pg Final     MCHC   Date Value Ref Range Status   07/28/2021 33.4 31.5 - 35.7 g/dL Final   02/20/2019 33.1 33.0 - 37.0 g/dL Final     RDW   Date Value Ref Range Status   07/28/2021 12.9 12.3 - 15.4 % Final   02/20/2019 12.9 11.5 - 14.5 % Final     RDW-SD   Date Value Ref Range Status   07/28/2021 39.2 37.0 - 54.0 fl Final     MPV   Date Value Ref Range Status   07/28/2021 11.8 6.0 - 12.0 fL Final   02/20/2019 11.2 9.4 - 12.4 fL Final     Platelets   Date Value Ref Range Status   07/28/2021 264 140 - 450 10*3/mm3 Final   02/20/2019 292 130 - 400 K/uL Final     Neutrophil Rel %   Date Value Ref Range Status   02/20/2019 56.4 50.0 - 65.0 % Final     Neutrophil %   Date Value Ref Range Status   07/28/2021 44.5 42.7 - 76.0 % Final     Lymphocyte Rel %   Date Value Ref Range Status   02/20/2019 33.2 20.0 - 40.0 % Final     Lymphocyte %   Date Value Ref Range Status   07/28/2021 42.9  "19.6 - 45.3 % Final     Monocyte Rel %   Date Value Ref Range Status   02/20/2019 7.9 0.0 - 10.0 % Final     Monocyte %   Date Value Ref Range Status   07/28/2021 9.3 5.0 - 12.0 % Final     Eosinophil Rel %   Date Value Ref Range Status   02/20/2019 1.4 0.0 - 5.0 % Final     Eosinophil %   Date Value Ref Range Status   07/28/2021 2.1 0.3 - 6.2 % Final     Basophil Rel %   Date Value Ref Range Status   02/20/2019 0.7 0.0 - 1.0 % Final     Basophil %   Date Value Ref Range Status   07/28/2021 0.9 0.0 - 1.5 % Final     Immature Grans %   Date Value Ref Range Status   07/28/2021 0.3 0.0 - 0.5 % Final     Neutrophils Absolute   Date Value Ref Range Status   02/20/2019 5.8 1.5 - 7.5 K/uL Final     Neutrophils, Absolute   Date Value Ref Range Status   07/28/2021 2.82 1.70 - 7.00 10*3/mm3 Final     Lymphocytes Absolute   Date Value Ref Range Status   02/20/2019 3.4 1.1 - 4.5 K/uL Final     Lymphocytes, Absolute   Date Value Ref Range Status   07/28/2021 2.72 0.70 - 3.10 10*3/mm3 Final     Monocytes Absolute   Date Value Ref Range Status   02/20/2019 0.80 0.00 - 0.90 K/uL Final     Monocytes, Absolute   Date Value Ref Range Status   07/28/2021 0.59 0.10 - 0.90 10*3/mm3 Final     Eosinophils Absolute   Date Value Ref Range Status   02/20/2019 0.10 0.00 - 0.60 K/uL Final     Eosinophils, Absolute   Date Value Ref Range Status   07/28/2021 0.13 0.00 - 0.40 10*3/mm3 Final     Basophils Absolute   Date Value Ref Range Status   02/20/2019 0.10 0.00 - 0.20 K/uL Final     Basophils, Absolute   Date Value Ref Range Status   07/28/2021 0.06 0.00 - 0.20 10*3/mm3 Final     Immature Grans, Absolute   Date Value Ref Range Status   07/28/2021 0.02 0.00 - 0.05 10*3/mm3 Final     nRBC   Date Value Ref Range Status   07/28/2021 0.0 0.0 - 0.2 /100 WBC Final         OBJECTIVE:  Visit Vitals  /72 (BP Location: Left arm, Patient Position: Sitting, Cuff Size: Adult)   Pulse 93   Temp 98.4 °F (36.9 °C) (Oral)   Resp 16   Ht 182.9 cm (72.01\") "   Wt (!) 146 kg (321 lb)   SpO2 96%   BMI 43.53 kg/m²      Physical Exam  Vitals reviewed.   Constitutional:       General: He is not in acute distress.     Appearance: He is well-developed.   HENT:      Head: Normocephalic and atraumatic.   Eyes:      Pupils: Pupils are equal, round, and reactive to light.   Neck:      Thyroid: No thyromegaly.      Trachea: Phonation normal.   Pulmonary:      Effort: No respiratory distress.   Skin:     Coloration: Skin is not pale.      Findings: No erythema.   Neurological:      Mental Status: He is alert.   Psychiatric:         Behavior: Behavior normal.         Thought Content: Thought content normal.         Judgment: Judgment normal.         Assessment/Plan    Diagnoses and all orders for this visit:    1. Dysfunctional gallbladder (Primary)  -     Ambulatory Referral to Nutrition Services    2. Hepatic steatosis  -     Ambulatory Referral to Nutrition Services      He would also like a referral to a dietitian to help him with diet both before and after his cholecystectomy.    If the symptoms continue after his cholecystectomy we will address his Rybelsus    I will be able to further risk stratify him after his stress echo.      Keep next follow-up or sooner if indicated    21 minutes was spent total on the visit    No follow-ups on file.    Patient was given instructions and counseling regarding his/her condition or for health maintenance advice. Please see specific information pulled into the AVS if appropriate.     CHARMAINE Sanchez MD   10:09 CDT  8/3/2021

## 2021-08-03 NOTE — TELEPHONE ENCOUNTER
PER DR PLATA.  PATIENT WILL BE HAVING GALLBLADDER SURGERY AND THE APPOINTMENT FOR 08/03/2021 COULD BE CANCELED.  DR PLATA STATED THAT HE WILL HAVE A HOSPITAL FOLLOW UP SCHEDULED AND THERE WILL BE 2 APPOINTMENTS.  DR PLATA STATED THAT HIS DIET WILL CHANGE.  THE APPOINTMENT SCHEDULED FOR TODAY IS FOR DIETICIAN REFERRAL.

## 2021-08-03 NOTE — TELEPHONE ENCOUNTER
PATIENT WAS CALLED, HE STATED THAT HE WANTED TO TALK TO DR PLATA ABOUT POSSIBLY NOT HAVING THE SURGERY.  HE ALSO WOULD LIKE TO KNOW WHAT THE EKG SAID AND DISCUSS THAT.  PATIENT WOULD LIKE TO KEEP THIS APPOINTMENT.

## 2021-08-05 ENCOUNTER — HOSPITAL ENCOUNTER (OUTPATIENT)
Dept: CARDIOLOGY | Facility: HOSPITAL | Age: 36
Discharge: HOME | End: 2021-08-05
Admitting: INTERNAL MEDICINE

## 2021-08-05 VITALS
DIASTOLIC BLOOD PRESSURE: 72 MMHG | HEART RATE: 88 BPM | BODY MASS INDEX: 42.66 KG/M2 | WEIGHT: 315 LBS | HEIGHT: 72 IN | SYSTOLIC BLOOD PRESSURE: 136 MMHG

## 2021-08-05 DIAGNOSIS — Z01.818 PRE-OP TESTING: ICD-10-CM

## 2021-08-05 DIAGNOSIS — R94.31 ABNORMAL EKG: ICD-10-CM

## 2021-08-05 PROCEDURE — 25010000002 PERFLUTREN 6.52 MG/ML SUSPENSION: Performed by: EMERGENCY MEDICINE

## 2021-08-05 PROCEDURE — 93018 CV STRESS TEST I&R ONLY: CPT | Performed by: EMERGENCY MEDICINE

## 2021-08-05 PROCEDURE — 93017 CV STRESS TEST TRACING ONLY: CPT

## 2021-08-05 PROCEDURE — 93350 STRESS TTE ONLY: CPT

## 2021-08-05 PROCEDURE — 93350 STRESS TTE ONLY: CPT | Performed by: EMERGENCY MEDICINE

## 2021-08-05 PROCEDURE — 93352 ADMIN ECG CONTRAST AGENT: CPT | Performed by: EMERGENCY MEDICINE

## 2021-08-05 RX ADMIN — PERFLUTREN 8.48 MG: 6.52 INJECTION, SUSPENSION INTRAVENOUS at 14:09

## 2021-08-06 ENCOUNTER — TELEPHONE (OUTPATIENT)
Dept: INTERNAL MEDICINE | Facility: CLINIC | Age: 36
End: 2021-08-06

## 2021-08-06 LAB
BH CV STRESS BP STAGE 1: NORMAL
BH CV STRESS BP STAGE 2: NORMAL
BH CV STRESS DURATION MIN STAGE 1: 3
BH CV STRESS DURATION MIN STAGE 2: 1
BH CV STRESS DURATION SEC STAGE 1: 0
BH CV STRESS DURATION SEC STAGE 2: 55
BH CV STRESS GRADE STAGE 1: 10
BH CV STRESS GRADE STAGE 2: 12
BH CV STRESS HR STAGE 1: 135
BH CV STRESS HR STAGE 2: 156
BH CV STRESS METS STAGE 1: 5
BH CV STRESS METS STAGE 2: 7.5
BH CV STRESS PROTOCOL 1: NORMAL
BH CV STRESS RECOVERY BP: NORMAL MMHG
BH CV STRESS RECOVERY HR: 96 BPM
BH CV STRESS SPEED STAGE 1: 1.7
BH CV STRESS SPEED STAGE 2: 2.5
BH CV STRESS STAGE 1: 1
BH CV STRESS STAGE 2: 2
MAXIMAL PREDICTED HEART RATE: 183 BPM
PERCENT MAX PREDICTED HR: 85.25 %
STRESS BASELINE BP: NORMAL MMHG
STRESS BASELINE HR: 88 BPM
STRESS PERCENT HR: 100 %
STRESS POST ESTIMATED WORKLOAD: 7.5 METS
STRESS POST EXERCISE DUR MIN: 4 MIN
STRESS POST EXERCISE DUR SEC: 55 SEC
STRESS POST PEAK BP: NORMAL MMHG
STRESS POST PEAK HR: 156 BPM
STRESS TARGET HR: 156 BPM

## 2021-08-06 NOTE — TELEPHONE ENCOUNTER
Caller: Gagandeep Ordoñez    Relationship to patient: Self    Best call back number: [phone]     Patient is needing: PATIENT CALLED REQUESTING THE RESULTS OF HIS STRESS TEST HE HAD DONE 08/05.

## 2021-08-07 ENCOUNTER — LAB (OUTPATIENT)
Dept: LAB | Facility: HOSPITAL | Age: 36
End: 2021-08-07

## 2021-08-07 LAB — SARS-COV-2 ORF1AB RESP QL NAA+PROBE: NOT DETECTED

## 2021-08-07 PROCEDURE — C9803 HOPD COVID-19 SPEC COLLECT: HCPCS | Performed by: STUDENT IN AN ORGANIZED HEALTH CARE EDUCATION/TRAINING PROGRAM

## 2021-08-07 PROCEDURE — U0004 COV-19 TEST NON-CDC HGH THRU: HCPCS | Performed by: STUDENT IN AN ORGANIZED HEALTH CARE EDUCATION/TRAINING PROGRAM

## 2021-08-10 ENCOUNTER — ANESTHESIA EVENT (OUTPATIENT)
Dept: PERIOP | Facility: HOSPITAL | Age: 36
End: 2021-08-10

## 2021-08-10 ENCOUNTER — HOSPITAL ENCOUNTER (OUTPATIENT)
Facility: HOSPITAL | Age: 36
Setting detail: HOSPITAL OUTPATIENT SURGERY
Discharge: HOME | End: 2021-08-10
Attending: STUDENT IN AN ORGANIZED HEALTH CARE EDUCATION/TRAINING PROGRAM | Admitting: STUDENT IN AN ORGANIZED HEALTH CARE EDUCATION/TRAINING PROGRAM

## 2021-08-10 ENCOUNTER — ANESTHESIA (OUTPATIENT)
Dept: PERIOP | Facility: HOSPITAL | Age: 36
End: 2021-08-10

## 2021-08-10 VITALS
SYSTOLIC BLOOD PRESSURE: 130 MMHG | OXYGEN SATURATION: 92 % | TEMPERATURE: 97.1 F | DIASTOLIC BLOOD PRESSURE: 80 MMHG | RESPIRATION RATE: 16 BRPM | HEART RATE: 108 BPM

## 2021-08-10 DIAGNOSIS — K82.8 BILIARY DYSKINESIA: Primary | ICD-10-CM

## 2021-08-10 DIAGNOSIS — K82.9 DISEASE OF GALLBLADDER, UNSPECIFIED: ICD-10-CM

## 2021-08-10 LAB
GLUCOSE BLDC GLUCOMTR-MCNC: 144 MG/DL (ref 70–130)
GLUCOSE BLDC GLUCOMTR-MCNC: 215 MG/DL (ref 70–130)

## 2021-08-10 PROCEDURE — 25010000002 DEXAMETHASONE SODIUM PHOSPHATE 10 MG/ML SOLUTION 1 ML VIAL: Performed by: STUDENT IN AN ORGANIZED HEALTH CARE EDUCATION/TRAINING PROGRAM

## 2021-08-10 PROCEDURE — 25010000002 FENTANYL CITRATE (PF) 250 MCG/5ML SOLUTION: Performed by: NURSE ANESTHETIST, CERTIFIED REGISTERED

## 2021-08-10 PROCEDURE — C1889 IMPLANT/INSERT DEVICE, NOC: HCPCS | Performed by: STUDENT IN AN ORGANIZED HEALTH CARE EDUCATION/TRAINING PROGRAM

## 2021-08-10 PROCEDURE — 25010000002 ONDANSETRON PER 1 MG: Performed by: ANESTHESIOLOGY

## 2021-08-10 PROCEDURE — 25010000002 PROPOFOL 10 MG/ML EMULSION: Performed by: NURSE ANESTHETIST, CERTIFIED REGISTERED

## 2021-08-10 PROCEDURE — 25010000002 MIDAZOLAM PER 1 MG: Performed by: ANESTHESIOLOGY

## 2021-08-10 PROCEDURE — 25010000002 HYDROMORPHONE PER 4 MG: Performed by: ANESTHESIOLOGY

## 2021-08-10 PROCEDURE — 25010000002 MORPHINE PER 10 MG: Performed by: STUDENT IN AN ORGANIZED HEALTH CARE EDUCATION/TRAINING PROGRAM

## 2021-08-10 PROCEDURE — 25010000002 HEPARIN (PORCINE) PER 1000 UNITS: Performed by: STUDENT IN AN ORGANIZED HEALTH CARE EDUCATION/TRAINING PROGRAM

## 2021-08-10 PROCEDURE — 88304 TISSUE EXAM BY PATHOLOGIST: CPT | Performed by: STUDENT IN AN ORGANIZED HEALTH CARE EDUCATION/TRAINING PROGRAM

## 2021-08-10 PROCEDURE — 88307 TISSUE EXAM BY PATHOLOGIST: CPT | Performed by: STUDENT IN AN ORGANIZED HEALTH CARE EDUCATION/TRAINING PROGRAM

## 2021-08-10 PROCEDURE — 25010000002 ONDANSETRON PER 1 MG: Performed by: NURSE ANESTHETIST, CERTIFIED REGISTERED

## 2021-08-10 PROCEDURE — 25010000002 FENTANYL CITRATE (PF) 50 MCG/ML SOLUTION: Performed by: ANESTHESIOLOGY

## 2021-08-10 PROCEDURE — 25010000002 CEFAZOLIN PER 500 MG: Performed by: STUDENT IN AN ORGANIZED HEALTH CARE EDUCATION/TRAINING PROGRAM

## 2021-08-10 PROCEDURE — 82962 GLUCOSE BLOOD TEST: CPT

## 2021-08-10 PROCEDURE — 25010000002 DEXAMETHASONE PER 1 MG: Performed by: ANESTHESIOLOGY

## 2021-08-10 DEVICE — LIGACLIP 10-M/L, 10MM ENDOSCOPIC ROTATING MULTIPLE CLIP APPLIERS
Type: IMPLANTABLE DEVICE | Site: ABDOMEN | Status: FUNCTIONAL
Brand: LIGACLIP

## 2021-08-10 RX ORDER — ACETAMINOPHEN 325 MG/1
975 TABLET ORAL EVERY 8 HOURS
Qty: 100 TABLET | Refills: 2
Start: 2021-08-10 | End: 2022-08-10

## 2021-08-10 RX ORDER — DEXAMETHASONE SODIUM PHOSPHATE 4 MG/ML
4 INJECTION, SOLUTION INTRA-ARTICULAR; INTRALESIONAL; INTRAMUSCULAR; INTRAVENOUS; SOFT TISSUE ONCE AS NEEDED
Status: COMPLETED | OUTPATIENT
Start: 2021-08-10 | End: 2021-08-10

## 2021-08-10 RX ORDER — FENTANYL CITRATE 50 UG/ML
25 INJECTION, SOLUTION INTRAMUSCULAR; INTRAVENOUS
Status: DISCONTINUED | OUTPATIENT
Start: 2021-08-10 | End: 2021-08-10 | Stop reason: HOSPADM

## 2021-08-10 RX ORDER — LIDOCAINE HYDROCHLORIDE 40 MG/ML
SOLUTION TOPICAL AS NEEDED
Status: DISCONTINUED | OUTPATIENT
Start: 2021-08-10 | End: 2021-08-10 | Stop reason: SURG

## 2021-08-10 RX ORDER — MIDAZOLAM HYDROCHLORIDE 1 MG/ML
1 INJECTION INTRAMUSCULAR; INTRAVENOUS
Status: DISCONTINUED | OUTPATIENT
Start: 2021-08-10 | End: 2021-08-10 | Stop reason: HOSPADM

## 2021-08-10 RX ORDER — LIDOCAINE HYDROCHLORIDE 10 MG/ML
0.5 INJECTION, SOLUTION EPIDURAL; INFILTRATION; INTRACAUDAL; PERINEURAL ONCE AS NEEDED
Status: DISCONTINUED | OUTPATIENT
Start: 2021-08-10 | End: 2021-08-10 | Stop reason: HOSPADM

## 2021-08-10 RX ORDER — SODIUM CHLORIDE 0.9 % (FLUSH) 0.9 %
3-10 SYRINGE (ML) INJECTION AS NEEDED
Status: DISCONTINUED | OUTPATIENT
Start: 2021-08-10 | End: 2021-08-10 | Stop reason: HOSPADM

## 2021-08-10 RX ORDER — BUPIVACAINE HYDROCHLORIDE 5 MG/ML
INJECTION, SOLUTION PERINEURAL AS NEEDED
Status: DISCONTINUED | OUTPATIENT
Start: 2021-08-10 | End: 2021-08-10 | Stop reason: HOSPADM

## 2021-08-10 RX ORDER — HYDROMORPHONE HYDROCHLORIDE 1 MG/ML
0.5 INJECTION, SOLUTION INTRAMUSCULAR; INTRAVENOUS; SUBCUTANEOUS
Status: DISCONTINUED | OUTPATIENT
Start: 2021-08-10 | End: 2021-08-10 | Stop reason: HOSPADM

## 2021-08-10 RX ORDER — MAGNESIUM HYDROXIDE 1200 MG/15ML
LIQUID ORAL AS NEEDED
Status: DISCONTINUED | OUTPATIENT
Start: 2021-08-10 | End: 2021-08-10 | Stop reason: HOSPADM

## 2021-08-10 RX ORDER — ACETAMINOPHEN 500 MG
1000 TABLET ORAL ONCE
Status: COMPLETED | OUTPATIENT
Start: 2021-08-10 | End: 2021-08-10

## 2021-08-10 RX ORDER — HEPARIN SODIUM 5000 [USP'U]/ML
5000 INJECTION, SOLUTION INTRAVENOUS; SUBCUTANEOUS ONCE
Status: COMPLETED | OUTPATIENT
Start: 2021-08-10 | End: 2021-08-10

## 2021-08-10 RX ORDER — SODIUM CHLORIDE 0.9 % (FLUSH) 0.9 %
3 SYRINGE (ML) INJECTION AS NEEDED
Status: DISCONTINUED | OUTPATIENT
Start: 2021-08-10 | End: 2021-08-10 | Stop reason: HOSPADM

## 2021-08-10 RX ORDER — ONDANSETRON 2 MG/ML
INJECTION INTRAMUSCULAR; INTRAVENOUS AS NEEDED
Status: DISCONTINUED | OUTPATIENT
Start: 2021-08-10 | End: 2021-08-10 | Stop reason: SURG

## 2021-08-10 RX ORDER — FLUMAZENIL 0.1 MG/ML
0.2 INJECTION INTRAVENOUS AS NEEDED
Status: DISCONTINUED | OUTPATIENT
Start: 2021-08-10 | End: 2021-08-10 | Stop reason: HOSPADM

## 2021-08-10 RX ORDER — NEOSTIGMINE METHYLSULFATE 5 MG/5 ML
SYRINGE (ML) INTRAVENOUS AS NEEDED
Status: DISCONTINUED | OUTPATIENT
Start: 2021-08-10 | End: 2021-08-10 | Stop reason: SURG

## 2021-08-10 RX ORDER — OXYCODONE AND ACETAMINOPHEN 10; 325 MG/1; MG/1
1 TABLET ORAL ONCE AS NEEDED
Status: COMPLETED | OUTPATIENT
Start: 2021-08-10 | End: 2021-08-10

## 2021-08-10 RX ORDER — ONDANSETRON 2 MG/ML
4 INJECTION INTRAMUSCULAR; INTRAVENOUS AS NEEDED
Status: DISCONTINUED | OUTPATIENT
Start: 2021-08-10 | End: 2021-08-10 | Stop reason: HOSPADM

## 2021-08-10 RX ORDER — ONDANSETRON 4 MG/1
4 TABLET, FILM COATED ORAL EVERY 8 HOURS PRN
Qty: 15 TABLET | Refills: 0 | Status: SHIPPED | OUTPATIENT
Start: 2021-08-10 | End: 2022-08-10

## 2021-08-10 RX ORDER — OXYCODONE HYDROCHLORIDE 5 MG/1
5 TABLET ORAL EVERY 8 HOURS PRN
Qty: 10 TABLET | Refills: 0 | Status: SHIPPED | OUTPATIENT
Start: 2021-08-10 | End: 2021-12-29

## 2021-08-10 RX ORDER — LABETALOL HYDROCHLORIDE 5 MG/ML
5 INJECTION, SOLUTION INTRAVENOUS
Status: DISCONTINUED | OUTPATIENT
Start: 2021-08-10 | End: 2021-08-10 | Stop reason: HOSPADM

## 2021-08-10 RX ORDER — SODIUM CHLORIDE, SODIUM LACTATE, POTASSIUM CHLORIDE, CALCIUM CHLORIDE 600; 310; 30; 20 MG/100ML; MG/100ML; MG/100ML; MG/100ML
1000 INJECTION, SOLUTION INTRAVENOUS CONTINUOUS
Status: DISCONTINUED | OUTPATIENT
Start: 2021-08-10 | End: 2021-08-10 | Stop reason: HOSPADM

## 2021-08-10 RX ORDER — LIDOCAINE HYDROCHLORIDE 20 MG/ML
INJECTION, SOLUTION EPIDURAL; INFILTRATION; INTRACAUDAL; PERINEURAL AS NEEDED
Status: DISCONTINUED | OUTPATIENT
Start: 2021-08-10 | End: 2021-08-10 | Stop reason: SURG

## 2021-08-10 RX ORDER — SODIUM CHLORIDE, SODIUM LACTATE, POTASSIUM CHLORIDE, CALCIUM CHLORIDE 600; 310; 30; 20 MG/100ML; MG/100ML; MG/100ML; MG/100ML
100 INJECTION, SOLUTION INTRAVENOUS CONTINUOUS
Status: DISCONTINUED | OUTPATIENT
Start: 2021-08-10 | End: 2021-08-10 | Stop reason: HOSPADM

## 2021-08-10 RX ORDER — SCOLOPAMINE TRANSDERMAL SYSTEM 1 MG/1
1 PATCH, EXTENDED RELEASE TRANSDERMAL CONTINUOUS
Status: DISCONTINUED | OUTPATIENT
Start: 2021-08-10 | End: 2021-08-10 | Stop reason: HOSPADM

## 2021-08-10 RX ORDER — EPHEDRINE SULFATE 50 MG/ML
INJECTION, SOLUTION INTRAVENOUS AS NEEDED
Status: DISCONTINUED | OUTPATIENT
Start: 2021-08-10 | End: 2021-08-10 | Stop reason: SURG

## 2021-08-10 RX ORDER — LIDOCAINE HYDROCHLORIDE AND EPINEPHRINE 10; 10 MG/ML; UG/ML
INJECTION, SOLUTION INFILTRATION; PERINEURAL AS NEEDED
Status: DISCONTINUED | OUTPATIENT
Start: 2021-08-10 | End: 2021-08-10 | Stop reason: HOSPADM

## 2021-08-10 RX ORDER — BUPIVACAINE HCL/0.9 % NACL/PF 0.1 %
2 PLASTIC BAG, INJECTION (ML) EPIDURAL ONCE
Status: COMPLETED | OUTPATIENT
Start: 2021-08-10 | End: 2021-08-10

## 2021-08-10 RX ORDER — PROPOFOL 10 MG/ML
VIAL (ML) INTRAVENOUS AS NEEDED
Status: DISCONTINUED | OUTPATIENT
Start: 2021-08-10 | End: 2021-08-10 | Stop reason: SURG

## 2021-08-10 RX ORDER — SODIUM CHLORIDE 0.9 % (FLUSH) 0.9 %
3 SYRINGE (ML) INJECTION EVERY 12 HOURS SCHEDULED
Status: DISCONTINUED | OUTPATIENT
Start: 2021-08-10 | End: 2021-08-10 | Stop reason: HOSPADM

## 2021-08-10 RX ORDER — SUCCINYLCHOLINE/SOD CL,ISO/PF 200MG/10ML
SYRINGE (ML) INTRAVENOUS AS NEEDED
Status: DISCONTINUED | OUTPATIENT
Start: 2021-08-10 | End: 2021-08-10 | Stop reason: SURG

## 2021-08-10 RX ORDER — ROCURONIUM BROMIDE 10 MG/ML
INJECTION, SOLUTION INTRAVENOUS AS NEEDED
Status: DISCONTINUED | OUTPATIENT
Start: 2021-08-10 | End: 2021-08-10 | Stop reason: SURG

## 2021-08-10 RX ORDER — IBUPROFEN 200 MG
600 TABLET ORAL EVERY 8 HOURS
Qty: 100 TABLET | Refills: 2
Start: 2021-08-10 | End: 2022-08-10

## 2021-08-10 RX ORDER — NALOXONE HCL 0.4 MG/ML
0.04 VIAL (ML) INJECTION AS NEEDED
Status: DISCONTINUED | OUTPATIENT
Start: 2021-08-10 | End: 2021-08-10 | Stop reason: HOSPADM

## 2021-08-10 RX ORDER — FENTANYL CITRATE 50 UG/ML
INJECTION, SOLUTION INTRAMUSCULAR; INTRAVENOUS AS NEEDED
Status: DISCONTINUED | OUTPATIENT
Start: 2021-08-10 | End: 2021-08-10 | Stop reason: SURG

## 2021-08-10 RX ORDER — SODIUM CHLORIDE 9 MG/ML
INJECTION, SOLUTION INTRAVENOUS AS NEEDED
Status: DISCONTINUED | OUTPATIENT
Start: 2021-08-10 | End: 2021-08-10 | Stop reason: HOSPADM

## 2021-08-10 RX ADMIN — FENTANYL CITRATE 50 MCG: 50 INJECTION, SOLUTION INTRAMUSCULAR; INTRAVENOUS at 08:43

## 2021-08-10 RX ADMIN — Medication 200 MG: at 07:52

## 2021-08-10 RX ADMIN — Medication 2 G: at 07:56

## 2021-08-10 RX ADMIN — EPHEDRINE SULFATE 20 MG: 50 INJECTION INTRAVENOUS at 08:19

## 2021-08-10 RX ADMIN — HEPARIN SODIUM 5000 UNITS: 5000 INJECTION INTRAVENOUS; SUBCUTANEOUS at 07:08

## 2021-08-10 RX ADMIN — ROCURONIUM BROMIDE 25 MG: 50 INJECTION INTRAVENOUS at 08:01

## 2021-08-10 RX ADMIN — ONDANSETRON 4 MG: 2 INJECTION INTRAMUSCULAR; INTRAVENOUS at 08:34

## 2021-08-10 RX ADMIN — FENTANYL CITRATE 50 MCG: 50 INJECTION, SOLUTION INTRAMUSCULAR; INTRAVENOUS at 08:39

## 2021-08-10 RX ADMIN — LIDOCAINE HYDROCHLORIDE 100 MG: 20 INJECTION, SOLUTION EPIDURAL; INFILTRATION; INTRACAUDAL; PERINEURAL at 07:52

## 2021-08-10 RX ADMIN — FENTANYL CITRATE 100 MCG: 50 INJECTION, SOLUTION INTRAMUSCULAR; INTRAVENOUS at 07:49

## 2021-08-10 RX ADMIN — HYDROMORPHONE HYDROCHLORIDE 0.5 MG: 1 INJECTION, SOLUTION INTRAMUSCULAR; INTRAVENOUS; SUBCUTANEOUS at 08:53

## 2021-08-10 RX ADMIN — Medication 3 MG: at 08:36

## 2021-08-10 RX ADMIN — FENTANYL CITRATE 50 MCG: 50 INJECTION, SOLUTION INTRAMUSCULAR; INTRAVENOUS at 08:20

## 2021-08-10 RX ADMIN — GLYCOPYRROLATE 0.4 MG: 0.2 INJECTION, SOLUTION INTRAMUSCULAR; INTRAVENOUS at 08:36

## 2021-08-10 RX ADMIN — SCOLOPAMINE TRANSDERMAL SYSTEM 1 PATCH: 1 PATCH, EXTENDED RELEASE TRANSDERMAL at 07:09

## 2021-08-10 RX ADMIN — OXYCODONE AND ACETAMINOPHEN 1 TABLET: 325; 10 TABLET ORAL at 09:33

## 2021-08-10 RX ADMIN — MIDAZOLAM 1 MG: 1 INJECTION INTRAMUSCULAR; INTRAVENOUS at 07:08

## 2021-08-10 RX ADMIN — ACETAMINOPHEN 1000 MG: 500 TABLET, FILM COATED ORAL at 07:08

## 2021-08-10 RX ADMIN — ROCURONIUM BROMIDE 10 MG: 50 INJECTION INTRAVENOUS at 07:52

## 2021-08-10 RX ADMIN — LIDOCAINE HYDROCHLORIDE 1 EACH: 40 SOLUTION TOPICAL at 07:53

## 2021-08-10 RX ADMIN — ONDANSETRON 4 MG: 2 INJECTION INTRAMUSCULAR; INTRAVENOUS at 09:07

## 2021-08-10 RX ADMIN — SODIUM CHLORIDE, POTASSIUM CHLORIDE, SODIUM LACTATE AND CALCIUM CHLORIDE 1000 ML: 600; 310; 30; 20 INJECTION, SOLUTION INTRAVENOUS at 06:16

## 2021-08-10 RX ADMIN — FENTANYL CITRATE 25 MCG: 50 INJECTION, SOLUTION INTRAMUSCULAR; INTRAVENOUS at 09:07

## 2021-08-10 RX ADMIN — DEXAMETHASONE SODIUM PHOSPHATE 4 MG: 4 INJECTION, SOLUTION INTRA-ARTICULAR; INTRALESIONAL; INTRAMUSCULAR; INTRAVENOUS; SOFT TISSUE at 07:09

## 2021-08-10 RX ADMIN — PROPOFOL 200 MG: 10 INJECTION, EMULSION INTRAVENOUS at 07:52

## 2021-08-10 NOTE — ANESTHESIA PROCEDURE NOTES
Airway  Urgency: elective    Date/Time: 8/10/2021 7:53 AM  Airway not difficult    General Information and Staff    Patient location during procedure: OR  CRNA: Jaclyn Villareal CRNA    Indications and Patient Condition  Indications for airway management: airway protection    Preoxygenated: yes  Mask difficulty assessment: 3 - difficult mask (inadequate, unstable or two providers) +/- NMBA    Final Airway Details  Final airway type: endotracheal airway      Successful airway: ETT  Cuffed: yes   Successful intubation technique: video laryngoscopy  Facilitating devices/methods: intubating stylet  Endotracheal tube insertion site: oral  Blade: Grey  Blade size: 4  ETT size (mm): 7.5  Cormack-Lehane Classification: grade I - full view of glottis  Placement verified by: chest auscultation and capnometry   Cuff volume (mL): 6  Measured from: lips  ETT/EBT  to lips (cm): 22  Number of attempts at approach: 1  Assessment: lips, teeth, and gum same as pre-op and atraumatic intubation    Additional Comments  Atraumatic

## 2021-08-10 NOTE — ANESTHESIA PREPROCEDURE EVALUATION
Anesthesia Evaluation     Patient summary reviewed   no history of anesthetic complications:  NPO Solid Status: > 8 hours  NPO Liquid Status: > 8 hours           Airway   Mallampati: II  TM distance: >3 FB  Neck ROM: full  No difficulty expected  Dental - normal exam     Pulmonary    (+) sleep apnea on CPAP,   (-) asthma, not a smoker  Cardiovascular   Exercise tolerance: good (4-7 METS)    ECG reviewed    (+) hypertension, hyperlipidemia,     ROS comment: 8/5/2021- low risk stress test    Neuro/Psych  (-) seizures, TIA, CVA  GI/Hepatic/Renal/Endo    (+) morbid obesity, GERD,  diabetes mellitus,   (-) liver disease, no renal disease    Musculoskeletal     Abdominal    Substance History      OB/GYN          Other                        Anesthesia Plan    ASA 3     general     intravenous induction     Anesthetic plan, all risks, benefits, and alternatives have been provided, discussed and informed consent has been obtained with: patient.

## 2021-08-10 NOTE — ANESTHESIA POSTPROCEDURE EVALUATION
Patient: Gagandeep Ordoñez    Procedure Summary     Date: 08/10/21 Room / Location:  [facility] OR 06 /  [facility]    Anesthesia Start: 0748 Anesthesia Stop: 0850    Procedure: LAPAROSCOPIC CHOLECYSTECTOMY, LAPAROSCOPIC LIVER BIOPSY, (N/A Abdomen) Diagnosis: (BILIARY DYSKINESIA)    Surgeons: Jaclyn Handy MD Provider: Jaclyn Villareal CRNA    Anesthesia Type: general ASA Status: 3          Anesthesia Type: general    Vitals  Vitals Value Taken Time   /82 08/10/21 0932   Temp 97.1 °F (36.2 °C) 08/10/21 0930   Pulse 80 08/10/21 0937   Resp 16 08/10/21 0930   SpO2 87 % 08/10/21 0937   Vitals shown include unvalidated device data.        Post Anesthesia Care and Evaluation    Patient location during evaluation: bedside  Patient participation: complete - patient participated  Level of consciousness: awake and awake and alert  Pain score: 0  Pain management: adequate  Airway patency: patent  Anesthetic complications: No anesthetic complications  PONV Status: none  Cardiovascular status: acceptable  Respiratory status: acceptable  Hydration status: acceptable    Comments: Patient discharged according to acceptable Radha score per RN assessment. See nursing records for further information.     Blood pressure 150/82, pulse 90, temperature 97.1 °F (36.2 °C), resp. rate 16, SpO2 (!) 89 %.

## 2021-08-11 LAB
CYTO UR: NORMAL
LAB AP CASE REPORT: NORMAL
PATH REPORT.FINAL DX SPEC: NORMAL
PATH REPORT.GROSS SPEC: NORMAL

## 2021-08-17 DIAGNOSIS — K21.9 GASTROESOPHAGEAL REFLUX DISEASE, UNSPECIFIED WHETHER ESOPHAGITIS PRESENT: ICD-10-CM

## 2021-08-18 RX ORDER — OMEPRAZOLE 40 MG/1
40 CAPSULE, DELAYED RELEASE ORAL DAILY
Qty: 30 CAPSULE | Refills: 5 | Status: SHIPPED | OUTPATIENT
Start: 2021-08-18 | End: 2021-12-29 | Stop reason: SDUPTHER

## 2021-08-31 ENCOUNTER — APPOINTMENT (OUTPATIENT)
Dept: CT IMAGING | Facility: HOSPITAL | Age: 36
End: 2021-08-31

## 2021-08-31 ENCOUNTER — APPOINTMENT (OUTPATIENT)
Dept: GENERAL RADIOLOGY | Facility: HOSPITAL | Age: 36
End: 2021-08-31

## 2021-08-31 ENCOUNTER — HOSPITAL ENCOUNTER (EMERGENCY)
Facility: HOSPITAL | Age: 36
Discharge: HOME | End: 2021-08-31
Admitting: EMERGENCY MEDICINE

## 2021-08-31 VITALS
SYSTOLIC BLOOD PRESSURE: 135 MMHG | WEIGHT: 314 LBS | HEART RATE: 91 BPM | TEMPERATURE: 98.1 F | HEIGHT: 73 IN | OXYGEN SATURATION: 93 % | BODY MASS INDEX: 41.62 KG/M2 | DIASTOLIC BLOOD PRESSURE: 74 MMHG | RESPIRATION RATE: 20 BRPM
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
## 2021-08-31 DIAGNOSIS — S32.009A CLOSED FRACTURE OF TRANSVERSE PROCESS OF LUMBAR VERTEBRA, INITIAL ENCOUNTER (HCC): ICD-10-CM

## 2021-08-31 DIAGNOSIS — Z90.49 S/P CHOLECYSTECTOMY: ICD-10-CM

## 2021-08-31 LAB
ALBUMIN SERPL-MCNC: 4.3 G/DL (ref 3.5–5.2)
ALBUMIN/GLOB SERPL: 1.5 G/DL
ALP SERPL-CCNC: 91 U/L (ref 39–117)
ALT SERPL W P-5'-P-CCNC: 41 U/L (ref 1–41)
ANION GAP SERPL CALCULATED.3IONS-SCNC: 14 MMOL/L (ref 5–15)
AST SERPL-CCNC: 30 U/L (ref 1–40)
BASOPHILS # BLD AUTO: 0.07 10*3/MM3 (ref 0–0.2)
BASOPHILS NFR BLD AUTO: 0.5 % (ref 0–1.5)
BILIRUB SERPL-MCNC: 0.4 MG/DL (ref 0–1.2)
BILIRUB UR QL STRIP: NEGATIVE
BUN SERPL-MCNC: 14 MG/DL (ref 6–20)
BUN/CREAT SERPL: 16.5 (ref 7–25)
CALCIUM SPEC-SCNC: 9.3 MG/DL (ref 8.6–10.5)
CHLORIDE SERPL-SCNC: 100 MMOL/L (ref 98–107)
CLARITY UR: CLEAR
CO2 SERPL-SCNC: 24 MMOL/L (ref 22–29)
COLOR UR: YELLOW
CREAT SERPL-MCNC: 0.85 MG/DL (ref 0.76–1.27)
DEPRECATED RDW RBC AUTO: 39.6 FL (ref 37–54)
EOSINOPHIL # BLD AUTO: 0.12 10*3/MM3 (ref 0–0.4)
EOSINOPHIL NFR BLD AUTO: 0.9 % (ref 0.3–6.2)
ERYTHROCYTE [DISTWIDTH] IN BLOOD BY AUTOMATED COUNT: 13.2 % (ref 12.3–15.4)
GFR SERPL CREATININE-BSD FRML MDRD: 101 ML/MIN/1.73
GLOBULIN UR ELPH-MCNC: 2.8 GM/DL
GLUCOSE SERPL-MCNC: 252 MG/DL (ref 65–99)
GLUCOSE UR STRIP-MCNC: NEGATIVE MG/DL
HCT VFR BLD AUTO: 40.4 % (ref 37.5–51)
HGB BLD-MCNC: 13.6 G/DL (ref 13–17.7)
HGB UR QL STRIP.AUTO: NEGATIVE
HOLD SPECIMEN: NORMAL
HOLD SPECIMEN: NORMAL
IMM GRANULOCYTES # BLD AUTO: 0.21 10*3/MM3 (ref 0–0.05)
IMM GRANULOCYTES NFR BLD AUTO: 1.6 % (ref 0–0.5)
KETONES UR QL STRIP: NEGATIVE
LEUKOCYTE ESTERASE UR QL STRIP.AUTO: NEGATIVE
LIPASE SERPL-CCNC: 31 U/L (ref 13–60)
LYMPHOCYTES # BLD AUTO: 2.18 10*3/MM3 (ref 0.7–3.1)
LYMPHOCYTES NFR BLD AUTO: 16.1 % (ref 19.6–45.3)
MCH RBC QN AUTO: 28 PG (ref 26.6–33)
MCHC RBC AUTO-ENTMCNC: 33.7 G/DL (ref 31.5–35.7)
MCV RBC AUTO: 83.3 FL (ref 79–97)
MONOCYTES # BLD AUTO: 0.71 10*3/MM3 (ref 0.1–0.9)
MONOCYTES NFR BLD AUTO: 5.3 % (ref 5–12)
NEUTROPHILS NFR BLD AUTO: 10.23 10*3/MM3 (ref 1.7–7)
NEUTROPHILS NFR BLD AUTO: 75.6 % (ref 42.7–76)
NITRITE UR QL STRIP: NEGATIVE
NRBC BLD AUTO-RTO: 0 /100 WBC (ref 0–0.2)
PH UR STRIP.AUTO: <=5 [PH] (ref 5–8)
PLATELET # BLD AUTO: 313 10*3/MM3 (ref 140–450)
PMV BLD AUTO: 10.9 FL (ref 6–12)
POTASSIUM SERPL-SCNC: 4 MMOL/L (ref 3.5–5.2)
PROT SERPL-MCNC: 7.1 G/DL (ref 6–8.5)
PROT UR QL STRIP: NEGATIVE
RBC # BLD AUTO: 4.85 10*6/MM3 (ref 4.14–5.8)
SODIUM SERPL-SCNC: 138 MMOL/L (ref 136–145)
SP GR UR STRIP: >1.03 (ref 1–1.03)
TROPONIN T SERPL-MCNC: <0.01 NG/ML (ref 0–0.03)
UROBILINOGEN UR QL STRIP: ABNORMAL
WBC # BLD AUTO: 13.52 10*3/MM3 (ref 3.4–10.8)
WHOLE BLOOD HOLD SPECIMEN: NORMAL
WHOLE BLOOD HOLD SPECIMEN: NORMAL

## 2021-08-31 PROCEDURE — 96374 THER/PROPH/DIAG INJ IV PUSH: CPT

## 2021-08-31 PROCEDURE — 81003 URINALYSIS AUTO W/O SCOPE: CPT | Performed by: NURSE PRACTITIONER

## 2021-08-31 PROCEDURE — 74177 CT ABD & PELVIS W/CONTRAST: CPT

## 2021-08-31 PROCEDURE — 85025 COMPLETE CBC W/AUTO DIFF WBC: CPT | Performed by: NURSE PRACTITIONER

## 2021-08-31 PROCEDURE — 71045 X-RAY EXAM CHEST 1 VIEW: CPT

## 2021-08-31 PROCEDURE — 25010000002 IOPAMIDOL 61 % SOLUTION: Performed by: NURSE PRACTITIONER

## 2021-08-31 PROCEDURE — 93005 ELECTROCARDIOGRAM TRACING: CPT | Performed by: NURSE PRACTITIONER

## 2021-08-31 PROCEDURE — 96375 TX/PRO/DX INJ NEW DRUG ADDON: CPT

## 2021-08-31 PROCEDURE — 25010000003 HYDROMORPHONE 1 MG/ML SOLUTION: Performed by: NURSE PRACTITIONER

## 2021-08-31 PROCEDURE — 96376 TX/PRO/DX INJ SAME DRUG ADON: CPT

## 2021-08-31 PROCEDURE — 83690 ASSAY OF LIPASE: CPT | Performed by: NURSE PRACTITIONER

## 2021-08-31 PROCEDURE — 84484 ASSAY OF TROPONIN QUANT: CPT | Performed by: NURSE PRACTITIONER

## 2021-08-31 PROCEDURE — 72125 CT NECK SPINE W/O DYE: CPT

## 2021-08-31 PROCEDURE — 71260 CT THORAX DX C+: CPT

## 2021-08-31 PROCEDURE — 99284 EMERGENCY DEPT VISIT MOD MDM: CPT

## 2021-08-31 PROCEDURE — 25010000002 ONDANSETRON PER 1 MG: Performed by: NURSE PRACTITIONER

## 2021-08-31 PROCEDURE — 80053 COMPREHEN METABOLIC PANEL: CPT | Performed by: NURSE PRACTITIONER

## 2021-08-31 PROCEDURE — 72131 CT LUMBAR SPINE W/O DYE: CPT

## 2021-08-31 PROCEDURE — 93010 ELECTROCARDIOGRAM REPORT: CPT | Performed by: INTERNAL MEDICINE

## 2021-08-31 PROCEDURE — 72128 CT CHEST SPINE W/O DYE: CPT

## 2021-08-31 RX ORDER — ONDANSETRON 2 MG/ML
4 INJECTION INTRAMUSCULAR; INTRAVENOUS ONCE
Status: COMPLETED | OUTPATIENT
Start: 2021-08-31 | End: 2021-08-31

## 2021-08-31 RX ORDER — CYCLOBENZAPRINE HCL 10 MG
10 TABLET ORAL 3 TIMES DAILY PRN
Qty: 20 TABLET | Refills: 0 | Status: SHIPPED | OUTPATIENT
Start: 2021-08-31 | End: 2021-12-29

## 2021-08-31 RX ORDER — OXYCODONE HYDROCHLORIDE AND ACETAMINOPHEN 5; 325 MG/1; MG/1
1 TABLET ORAL EVERY 6 HOURS PRN
Qty: 12 TABLET | Refills: 0 | Status: SHIPPED | OUTPATIENT
Start: 2021-08-31 | End: 2021-12-29

## 2021-08-31 RX ORDER — SODIUM CHLORIDE 0.9 % (FLUSH) 0.9 %
10 SYRINGE (ML) INJECTION AS NEEDED
Status: DISCONTINUED | OUTPATIENT
Start: 2021-08-31 | End: 2021-09-01 | Stop reason: HOSPADM

## 2021-08-31 RX ADMIN — ONDANSETRON 4 MG: 2 INJECTION INTRAMUSCULAR; INTRAVENOUS at 19:38

## 2021-08-31 RX ADMIN — HYDROMORPHONE HYDROCHLORIDE 1 MG: 1 INJECTION, SOLUTION INTRAMUSCULAR; INTRAVENOUS; SUBCUTANEOUS at 19:38

## 2021-08-31 RX ADMIN — HYDROMORPHONE HYDROCHLORIDE 1 MG: 1 INJECTION, SOLUTION INTRAMUSCULAR; INTRAVENOUS; SUBCUTANEOUS at 21:15

## 2021-08-31 RX ADMIN — IOPAMIDOL 100 ML: 612 INJECTION, SOLUTION INTRAVENOUS at 20:45

## 2021-08-31 RX ADMIN — ONDANSETRON 4 MG: 2 INJECTION INTRAMUSCULAR; INTRAVENOUS at 21:15

## 2021-09-01 LAB
QT INTERVAL: 350 MS
QTC INTERVAL: 423 MS

## 2021-09-01 NOTE — ED PROVIDER NOTES
Subjective   Patient is a 35-year-old white male presents the emergency department after he fell off of his lawnmower just prior to arrival room.  He states he was mowing up the hill and the mower backwards on him and he was thrown off of the mower and the mower landed on him.  He states he is having pain to the left lateral chest wall, mid thoracic area, low back area.  Patient also states that he is status post cholecystectomy on August 10 and is having some abdominal pain as well.  He denies any head or neck trauma.  He denies any loss of consciousness.  He denies any numbness or focal weakness.  He denies any shortness of breath however he states he has increased left lateral chest wall pain with any deep inspiration or movement.      History provided by:  Patient   used: No        Review of Systems   Constitutional: Negative.    HENT: Negative.    Eyes: Negative.    Respiratory: Negative.    Cardiovascular: Negative.    Gastrointestinal: Negative.    Endocrine: Negative.    Genitourinary: Negative.    Musculoskeletal:        Patient is a 35-year-old white male presents the emergency department after he fell off of his lawnmower just prior to arrival room.  He states he was mowing up the hill and the mower backwards on him and he was thrown off of the mower and the mower landed on him.  He states he is having pain to the left lateral chest wall, mid thoracic area, low back area.  Patient also states that he is status post cholecystectomy on August 10 and is having some abdominal pain as well.  He denies any head or neck trauma.  He denies any loss of consciousness.  He denies any numbness or focal weakness.  He denies any shortness of breath however he states he has increased left lateral chest wall pain with any deep inspiration or movement.     Skin: Negative.    Allergic/Immunologic: Negative.    Neurological: Negative.    Hematological: Negative.    Psychiatric/Behavioral: Negative.     All other systems reviewed and are negative.      Past Medical History:   Diagnosis Date   • Acid reflux    • Back pain    • Hypertension    • Sleep apnea     wears cpap   • Type 2 diabetes mellitus (CMS/HCC)        No Known Allergies    Past Surgical History:   Procedure Laterality Date   • CHOLECYSTECTOMY LAPAROSCOPIC INTRAOPERATIVE CHOLANGIOGRAM WITH LIVER BIOPSY N/A 8/10/2021    Procedure: LAPAROSCOPIC CHOLECYSTECTOMY, LAPAROSCOPIC LIVER BIOPSY,;  Surgeon: Jaclyn Handy MD;  Location: [facility];  Service: General;  Laterality: N/A;   • HAND SURGERY     • HERNIA REPAIR     • VASECTOMY  04/15/2021       Family History   Problem Relation Age of Onset   • Diabetes Mother    • Stroke Father         X4   • Heart attack Maternal Grandmother    • Lung cancer Maternal Grandfather        Social History     Socioeconomic History   • Marital status:      Spouse name: Not on file   • Number of children: Not on file   • Years of education: Not on file   • Highest education level: Not on file   Tobacco Use   • Smoking status: Never Smoker   • Smokeless tobacco: Former User   Vaping Use   • Vaping Use: Never used   Substance and Sexual Activity   • Alcohol use: Yes     Comment: Rarely   • Drug use: No   • Sexual activity: Yes     Partners: Female       Prior to Admission medications    Medication Sig Start Date End Date Taking? Authorizing Provider   acetaminophen (Tylenol) 325 MG tablet Take 3 tablets by mouth Every 8 (Eight) Hours. Take every 8 hours for 3 days then take prn as needed. 8/10/21 8/10/22  Jaclyn Handy MD   amLODIPine (NORVASC) 10 MG tablet Take 1 tablet by mouth Daily. 3/30/21   Mynor Walker DO   diphenoxylate-atropine (LOMOTIL) 2.5-0.025 MG per tablet Take 1 tablet by mouth As Needed. 12/24/19   Provider, MD Blair   FLUoxetine (PROzac) 20 MG capsule TAKE 1 CAPSULE BY MOUTH ONCE DAILY 7/14/21   Abigail Story APRN   hydroCHLOROthiazide (HYDRODIURIL) 50 MG tablet TAKE 1  "TABLET BY MOUTH ONCE DAILY 7/14/21   Abigail Story APRN   ibuprofen (Motrin IB) 200 MG tablet Take 3 tablets by mouth Every 8 (Eight) Hours. Take every 8 hours for three days then take as needed. 8/10/21 8/10/22  Jaclyn Handy MD   losartan (COZAAR) 100 MG tablet Take 1 tablet by mouth Daily. 3/30/21   Mynor Walker DO   metFORMIN (GLUCOPHAGE) 1000 MG tablet Take 1 tablet by mouth 2 (Two) Times a Day With Meals. 3/30/21   Mynor Walker DO   omeprazole (priLOSEC) 40 MG capsule Take 1 capsule by mouth Daily. 8/18/21   Abigail Story APRN   ondansetron (Zofran) 4 MG tablet Take 1 tablet by mouth Every 8 (Eight) Hours As Needed for Nausea. 8/10/21 8/10/22  Jaclyn Handy MD   oxyCODONE (Roxicodone) 5 MG immediate release tablet Take 1 tablet by mouth Every 8 (Eight) Hours As Needed for Severe Pain . 8/10/21 8/10/22  Jaclyn Handy MD   Semaglutide (Rybelsus) 7 MG tablet Take 7 mg by mouth Daily. 3/30/21   Mynor Walker DO   True Metrix Blood Glucose Test test strip TEST BLOOD SUGAR FOUR TIMES DAILY 12/23/20   Mynor Walker DO       /74   Pulse 91   Temp 98.1 °F (36.7 °C) (Oral)   Resp 20   Ht 185.4 cm (73\")   Wt (!) 142 kg (314 lb)   SpO2 93%   BMI 41.43 kg/m²     Objective   Physical Exam  Vitals and nursing note reviewed.   Constitutional:       Appearance: He is well-developed.   HENT:      Head: Normocephalic and atraumatic.   Eyes:      Conjunctiva/sclera: Conjunctivae normal.      Pupils: Pupils are equal, round, and reactive to light.   Cardiovascular:      Rate and Rhythm: Normal rate and regular rhythm.      Heart sounds: Normal heart sounds.   Pulmonary:      Effort: Pulmonary effort is normal.      Breath sounds: Normal breath sounds.   Abdominal:      General: Bowel sounds are normal.      Palpations: Abdomen is soft.      Comments: Abdomen is soft, nondistended.  Puncture wounds from cholecystectomy have healed well.  There is no signs of infection " noted.  Patient has mild tenderness to the lower quadrants bilaterally.  There is no guarding or rebound noted.  No ecchymosis noted or soft tissue swelling   Musculoskeletal:         General: Normal range of motion.      Cervical back: Normal range of motion and neck supple.      Comments: Patient has moderate tenderness to the left lateral chest wall.  There is no ecchymosis or soft tissue swelling noted.  Lungs are clear to auscultation.  There is no crepitus noted.  He has moderate tenderness noted to the paraspinal muscles of the lower lumbar spine to the left.  There is no step-off or laxity noted.  No ecchymosis noted.  He has tenderness to the mid thoracic spine on palpation.  No step-off or laxity noted.  No ecchymosis or soft tissue swelling noted.   Skin:     General: Skin is warm and dry.   Neurological:      Mental Status: He is alert and oriented to person, place, and time.      Deep Tendon Reflexes: Reflexes are normal and symmetric.   Psychiatric:         Behavior: Behavior normal.         Thought Content: Thought content normal.         Judgment: Judgment normal.         Procedures         Lab Results (last 24 hours)     Procedure Component Value Units Date/Time    CBC & Differential [[id number]]  (Abnormal) Collected: 08/31/21 1937    Specimen: Blood Updated: 08/31/21 1950    Narrative:      The following orders were created for panel order CBC & Differential.  Procedure                               Abnormality         Status                     ---------                               -----------         ------                     CBC Auto Differential[[id number]]        Abnormal            Final result                 Please view results for these tests on the individual orders.    Comprehensive Metabolic Panel [[id number]]  (Abnormal) Collected: 08/31/21 1937    Specimen: Blood Updated: 08/31/21 2015     Glucose 252 mg/dL      BUN 14 mg/dL      Creatinine 0.85 mg/dL      Sodium 138 mmol/L       Potassium 4.0 mmol/L      Chloride 100 mmol/L      CO2 24.0 mmol/L      Calcium 9.3 mg/dL      Total Protein 7.1 g/dL      Albumin 4.30 g/dL      ALT (SGPT) 41 U/L      AST (SGOT) 30 U/L      Alkaline Phosphatase 91 U/L      Total Bilirubin 0.4 mg/dL      eGFR Non African Amer 101 mL/min/1.73      Globulin 2.8 gm/dL      A/G Ratio 1.5 g/dL      BUN/Creatinine Ratio 16.5     Anion Gap 14.0 mmol/L     Narrative:      GFR Normal >60  Chronic Kidney Disease <60  Kidney Failure <15      Lipase [[id number]]  (Normal) Collected: 08/31/21 1937    Specimen: Blood Updated: 08/31/21 2011     Lipase 31 U/L     Troponin [[id number]]  (Normal) Collected: 08/31/21 1937    Specimen: Blood Updated: 08/31/21 2013     Troponin T <0.010 ng/mL     Narrative:      Troponin T Reference Range:  <= 0.03 ng/mL-   Negative for AMI  >0.03 ng/mL-     Abnormal for myocardial necrosis.  Clinicians would have to utilize clinical acumen, EKG, Troponin and serial changes to determine if it is an Acute Myocardial Infarction or myocardial injury due to an underlying chronic condition.       Results may be falsely decreased if patient taking Biotin.      CBC Auto Differential [[id number]]  (Abnormal) Collected: 08/31/21 1937    Specimen: Blood Updated: 08/31/21 1950     WBC 13.52 10*3/mm3      RBC 4.85 10*6/mm3      Hemoglobin 13.6 g/dL      Hematocrit 40.4 %      MCV 83.3 fL      MCH 28.0 pg      MCHC 33.7 g/dL      RDW 13.2 %      RDW-SD 39.6 fl      MPV 10.9 fL      Platelets 313 10*3/mm3      Neutrophil % 75.6 %      Lymphocyte % 16.1 %      Monocyte % 5.3 %      Eosinophil % 0.9 %      Basophil % 0.5 %      Immature Grans % 1.6 %      Neutrophils, Absolute 10.23 10*3/mm3      Lymphocytes, Absolute 2.18 10*3/mm3      Monocytes, Absolute 0.71 10*3/mm3      Eosinophils, Absolute 0.12 10*3/mm3      Basophils, Absolute 0.07 10*3/mm3      Immature Grans, Absolute 0.21 10*3/mm3      nRBC 0.0 /100 WBC     Urinalysis With Microscopic If Indicated (No Culture)  - Urine, Clean Catch [[id number]]  (Abnormal) Collected: 08/31/21 2142    Specimen: Urine, Clean Catch Updated: 08/31/21 2153     Color, UA Yellow     Appearance, UA Clear     pH, UA <=5.0     Specific Gravity, UA >1.030     Glucose, UA Negative     Ketones, UA Negative     Bilirubin, UA Negative     Blood, UA Negative     Protein, UA Negative     Leuk Esterase, UA Negative     Nitrite, UA Negative     Urobilinogen, UA 1.0 E.U./dL    Narrative:      Urine microscopic not indicated.          CT Abdomen Pelvis With Contrast   Final Result   1. No evidence of soft tissue organ injury or hemoperitoneum.   2. Steatosis of the liver.   3. Diastases of the abdominal musculature at and above the umbilicus   with a fat-containing periumbilical hernia which is moderate in size.   4. Acute fracture left L1 transverse process. No additional fractures   are identified..   5. Steatosis of the liver.           This report was finalized on 08/31/2021 21:26 by Dr. Mich Cueva MD.      CT Chest With Contrast Diagnostic   Final Result   1. Unremarkable CT of the chest. No evidence of acute posttraumatic   injury to the thorax.           This report was finalized on 08/31/2021 21:22 by Dr. Mich Cueva MD.      CT Lumbar Spine Without Contrast   Final Result   Impression:    1. Acute fracture of the left L1 transverse process. No additional   fractures are identified.   2. Accentuated lumbar lordosis. No evidence of listhesis. There is   bulging of the disc with endplate spurring at L5-S1 with central and   foraminal stenosis. This also a central and left paracentral partially   calcified central disc protrusion at L4-L5 with mild associated central   stenosis.           This report was finalized on 08/31/2021 21:17 by Dr. Mich Cueva MD.      CT Thoracic Spine Without Contrast   Final Result       1. Acute fracture left L1 transverse process. No additional acute   fractures are identified.   2. Degenerative disc  disease with spondylosis in the mid and lower   thoracic spine with an accentuated thoracic kyphosis. Mild wedging of   several adjacent lower thoracic vertebral bodies are felt to be chronic   in nature and may be developmental..           This report was finalized on 08/31/2021 21:06 by Dr. Mich Cueva MD.      CT Cervical Spine Without Contrast   Final Result   1. No evidence of acute osseous injury or malalignment in the cervical   spine.           This report was finalized on 08/31/2021 21:01 by Dr. Mich Cueva MD.      XR Chest 1 View   Final Result   1. No acute disease.   This report was finalized on 08/31/2021 20:02 by Dr. Mich Cueva MD.          ED Course  ED Course as of Aug 31 2233   Tue Aug 31, 2021   2054 Pending ct scans at this time     [CW]   2205 Reviewed pt and pt care plan with dr gibbons- also in agreement with pt care plan. Ct lumbar spine indicates acute fx of left L1 transverse process. Will have pt follow up with neurosurgeon- will provide dr hart contact information as he is on call. Will prescribe pt pain medication and muscle relaxant. Advised to remain on light duty at work until released per neurosurgery. Pt is in agreement with care plan. Pt will be discharged home soon in stable condition     [CW]      ED Course User Index  [CW] Minerva Isbell, KARUNA          MDM  Number of Diagnoses or Management Options  Closed fracture of transverse process of lumbar vertebra, initial encounter (CMS/[facility]): minor  Fall, initial encounter: minor  S/P cholecystectomy: minor     Amount and/or Complexity of Data Reviewed  Clinical lab tests: ordered and reviewed  Tests in the radiology section of CPT®: ordered and reviewed    Patient Progress  Patient progress: stable      Final diagnoses:   Fall, initial encounter   Closed fracture of transverse process of lumbar vertebra, initial encounter (CMS/[facility])   S/P cholecystectomy          Minerva Isbell, APRN  08/31/21  [id number]

## 2021-09-07 ENCOUNTER — TELEPHONE (OUTPATIENT)
Dept: INTERNAL MEDICINE | Facility: CLINIC | Age: 36
End: 2021-09-07

## 2021-09-07 NOTE — TELEPHONE ENCOUNTER
Patient called and stated that he was in the ER and stated that he has a L1 fracture and that the ER put him on light duty. However he works for the [employer] department he is needing a letter stating what he can and can't do. I told him that he would more than likely need to be seen by occupational medicine but that I would check with you prior to giving him the exact answer.

## 2021-09-10 ENCOUNTER — TELEPHONE (OUTPATIENT)
Dept: INTERNAL MEDICINE | Facility: CLINIC | Age: 36
End: 2021-09-10

## 2021-09-10 NOTE — TELEPHONE ENCOUNTER
Called and spoke with pt. He states that Carlos Edmond will not write the letter until he seems that office. He states he cannot be off work any longer. He is asking if he comes into the office for a visit would you be able to write the letter? Please advise

## 2021-09-10 NOTE — TELEPHONE ENCOUNTER
Caller: Gagandeep Ordoñez    Relationship to patient: Self    Best call back number: [phone]    Patient is needing: PATIENT CALLED BACK REGARDING THE REQUEST FOR A WORK EXCUSE. (PREVIOUSE ENCOUNTER ON 09/07)  PATIENT HAS AN L1 FRACTURE AND THE ER PUT IN ON LIGHT DUTY. PATIENT STATES IT WOULD BE SEVERAL WEEKS BEFORE HE COULD GET INTO OCCUPATIONAL MEDICINE, AND CAN NOT WAIT THAT LONG.     PATIENT IS WANTING TO KNOW IF  WOULD WRITE HIM THAT LETTER.

## 2021-09-16 ENCOUNTER — APPOINTMENT (OUTPATIENT)
Dept: NUTRITION | Facility: HOSPITAL | Age: 36
End: 2021-09-16

## 2021-09-20 ENCOUNTER — TELEPHONE (OUTPATIENT)
Dept: INTERNAL MEDICINE | Facility: CLINIC | Age: 36
End: 2021-09-20

## 2021-09-20 NOTE — TELEPHONE ENCOUNTER
Caller: Gagandeep Ordoñez    Relationship: Self    Best call back number: [phone]     What is the best time to reach you:     Who are you requesting to speak with (clinical staff, provider,  specific staff member): NURSE    Do you know the name of the person who called: PATIENT     What was the call regarding: PATIENT'S INSURANCE NO LONGER RYBELSUS 7 MG     Do you require a callback: YES        PATIENT HAS 1 WEEK'S MEDICATION REMAINING

## 2021-09-21 NOTE — TELEPHONE ENCOUNTER
PATIENT HAS BEEN CALLED, GIVEN MESSAGE AND WOULD LIKE TO TRY NOT TAKING ANYTHING TO SEE HOW THAT GOES.

## 2021-09-23 ENCOUNTER — OFFICE VISIT (OUTPATIENT)
Dept: NEUROSURGERY | Facility: CLINIC | Age: 36
End: 2021-09-23

## 2021-09-23 VITALS — BODY MASS INDEX: 42.39 KG/M2 | HEIGHT: 72 IN | WEIGHT: 313 LBS

## 2021-09-23 DIAGNOSIS — S32.009A CLOSED FRACTURE OF TRANSVERSE PROCESS OF LUMBAR VERTEBRA, INITIAL ENCOUNTER (HCC): Primary | ICD-10-CM

## 2021-09-23 DIAGNOSIS — E66.01 CLASS 3 SEVERE OBESITY DUE TO EXCESS CALORIES WITH BODY MASS INDEX (BMI) OF 40.0 TO 44.9 IN ADULT, UNSPECIFIED WHETHER SERIOUS COMORBIDITY PRESENT (HCC): ICD-10-CM

## 2021-09-23 PROBLEM — E66.813 CLASS 3 SEVERE OBESITY DUE TO EXCESS CALORIES WITH BODY MASS INDEX (BMI) OF 40.0 TO 44.9 IN ADULT: Status: ACTIVE | Noted: 2019-03-21

## 2021-09-23 PROCEDURE — 99213 OFFICE O/P EST LOW 20 MIN: CPT | Performed by: NURSE PRACTITIONER

## 2021-09-23 NOTE — PATIENT INSTRUCTIONS
"https://www.nhlbi.nih.gov/files/docs/public/heart/dash_brief.pdf\">   DASH Eating Plan  DASH stands for Dietary Approaches to Stop Hypertension. The DASH eating plan is a healthy eating plan that has been shown to:  · Reduce high blood pressure (hypertension).  · Reduce your risk for type 2 diabetes, heart disease, and stroke.  · Help with weight loss.  What are tips for following this plan?  Reading food labels  · Check food labels for the amount of salt (sodium) per serving. Choose foods with less than 5 percent of the Daily Value of sodium. Generally, foods with less than 300 milligrams (mg) of sodium per serving fit into this eating plan.  · To find whole grains, look for the word \"whole\" as the first word in the ingredient list.  Shopping  · Buy products labeled as \"low-sodium\" or \"no salt added.\"  · Buy fresh foods. Avoid canned foods and pre-made or frozen meals.  Cooking  · Avoid adding salt when cooking. Use salt-free seasonings or herbs instead of table salt or sea salt. Check with your health care provider or pharmacist before using salt substitutes.  · Do not roa foods. Cook foods using healthy methods such as baking, boiling, grilling, roasting, and broiling instead.  · Cook with heart-healthy oils, such as olive, canola, avocado, soybean, or sunflower oil.  Meal planning    · Eat a balanced diet that includes:  ? 4 or more servings of fruits and 4 or more servings of vegetables each day. Try to fill one-half of your plate with fruits and vegetables.  ? 6-8 servings of whole grains each day.  ? Less than 6 oz (170 g) of lean meat, poultry, or fish each day. A 3-oz (85-g) serving of meat is about the same size as a deck of cards. One egg equals 1 oz (28 g).  ? 2-3 servings of low-fat dairy each day. One serving is 1 cup (237 mL).  ? 1 serving of nuts, seeds, or beans 5 times each week.  ? 2-3 servings of heart-healthy fats. Healthy fats called omega-3 fatty acids are found in foods such as walnuts, " flaxseeds, fortified milks, and eggs. These fats are also found in cold-water fish, such as sardines, salmon, and mackerel.  · Limit how much you eat of:  ? Canned or prepackaged foods.  ? Food that is high in trans fat, such as some fried foods.  ? Food that is high in saturated fat, such as fatty meat.  ? Desserts and other sweets, sugary drinks, and other foods with added sugar.  ? Full-fat dairy products.  · Do not salt foods before eating.  · Do not eat more than 4 egg yolks a week.  · Try to eat at least 2 vegetarian meals a week.  · Eat more home-cooked food and less restaurant, buffet, and fast food.    Lifestyle  · When eating at a restaurant, ask that your food be prepared with less salt or no salt, if possible.  · If you drink alcohol:  ? Limit how much you use to:  § 0-1 drink a day for women who are not pregnant.  § 0-2 drinks a day for men.  ? Be aware of how much alcohol is in your drink. In the [place] one drink equals one 12 oz bottle of beer (355 mL), one 5 oz glass of wine (148 mL), or one 1½ oz glass of hard liquor (44 mL).  General information  · Avoid eating more than 2,300 mg of salt a day. If you have hypertension, you may need to reduce your sodium intake to 1,500 mg a day.  · Work with your health care provider to maintain a healthy body weight or to lose weight. Ask what an ideal weight is for you.  · Get at least 30 minutes of exercise that causes your heart to beat faster (aerobic exercise) most days of the week. Activities may include walking, swimming, or biking.  · Work with your health care provider or dietitian to adjust your eating plan to your individual calorie needs.  What foods should I eat?  Fruits  All fresh, dried, or frozen fruit. Canned fruit in natural juice (without added sugar).  Vegetables  Fresh or frozen vegetables (raw, steamed, roasted, or grilled). Low-sodium or reduced-sodium tomato and vegetable juice. Low-sodium or reduced-sodium tomato sauce and tomato paste.  Low-sodium or reduced-sodium canned vegetables.  Grains  Whole-grain or whole-wheat bread. Whole-grain or whole-wheat pasta. Brown rice. Oatmeal. Quinoa. Bulgur. Whole-grain and low-sodium cereals. Fouzia bread. Low-fat, low-sodium crackers. Whole-wheat flour tortillas.  Meats and other proteins  Skinless chicken or turkey. Ground chicken or turkey. Pork with fat trimmed off. Fish and seafood. Egg whites. Dried beans, peas, or lentils. Unsalted nuts, nut butters, and seeds. Unsalted canned beans. Lean cuts of beef with fat trimmed off. Low-sodium, lean precooked or cured meat, such as sausages or meat loaves.  Dairy  Low-fat (1%) or fat-free (skim) milk. Reduced-fat, low-fat, or fat-free cheeses. Nonfat, low-sodium ricotta or cottage cheese. Low-fat or nonfat yogurt. Low-fat, low-sodium cheese.  Fats and oils  Soft margarine without trans fats. Vegetable oil. Reduced-fat, low-fat, or light mayonnaise and salad dressings (reduced-sodium). Canola, safflower, olive, avocado, soybean, and sunflower oils. Avocado.  Seasonings and condiments  Herbs. Spices. Seasoning mixes without salt.  Other foods  Unsalted popcorn and pretzels. Fat-free sweets.  The items listed above may not be a complete list of foods and beverages you can eat. Contact a dietitian for more information.  What foods should I avoid?  Fruits  Canned fruit in a light or heavy syrup. Fried fruit. Fruit in cream or butter sauce.  Vegetables  Creamed or fried vegetables. Vegetables in a cheese sauce. Regular canned vegetables (not low-sodium or reduced-sodium). Regular canned tomato sauce and paste (not low-sodium or reduced-sodium). Regular tomato and vegetable juice (not low-sodium or reduced-sodium). Pickles. Olives.  Grains  Baked goods made with fat, such as croissants, muffins, or some breads. Dry pasta or rice meal packs.  Meats and other proteins  Fatty cuts of meat. Ribs. Fried meat. Hernandez. Bologna, salami, and other precooked or cured meats, such as  sausages or meat loaves. Fat from the back of a pig (fatback). Bratwurst. Salted nuts and seeds. Canned beans with added salt. Canned or smoked fish. Whole eggs or egg yolks. Chicken or turkey with skin.  Dairy  Whole or 2% milk, cream, and half-and-half. Whole or full-fat cream cheese. Whole-fat or sweetened yogurt. Full-fat cheese. Nondairy creamers. Whipped toppings. Processed cheese and cheese spreads.  Fats and oils  Butter. Stick margarine. Lard. Shortening. Ghee. Hernandez fat. Tropical oils, such as coconut, palm kernel, or palm oil.  Seasonings and condiments  Onion salt, garlic salt, seasoned salt, table salt, and sea salt. Worcestershire sauce. Tartar sauce. Barbecue sauce. Teriyaki sauce. Soy sauce, including reduced-sodium. Steak sauce. Canned and packaged gravies. Fish sauce. Oyster sauce. Cocktail sauce. Store-bought horseradish. Ketchup. Mustard. Meat flavorings and tenderizers. Bouillon cubes. Hot sauces. Pre-made or packaged marinades. Pre-made or packaged taco seasonings. Relishes. Regular salad dressings.  Other foods  Salted popcorn and pretzels.  The items listed above may not be a complete list of foods and beverages you should avoid. Contact a dietitian for more information.  Where to find more information  · National Heart, Lung, and Blood [place]: www.nhlbi.nih.gov  · American Heart Association: www.heart.org  · Academy of Nutrition and Dietetics: www.eatright.org  · National Kidney Foundation: www.kidney.org  Summary  · The DASH eating plan is a healthy eating plan that has been shown to reduce high blood pressure (hypertension). It may also reduce your risk for type 2 diabetes, heart disease, and stroke.  · When on the DASH eating plan, aim to eat more fresh fruits and vegetables, whole grains, lean proteins, low-fat dairy, and heart-healthy fats.  · With the DASH eating plan, you should limit salt (sodium) intake to 2,300 mg a day. If you have hypertension, you may need to reduce your  sodium intake to 1,500 mg a day.  · Work with your health care provider or dietitian to adjust your eating plan to your individual calorie needs.  This information is not intended to replace advice given to you by your health care provider. Make sure you discuss any questions you have with your health care provider.  Document Revised: 11/20/2020 Document Reviewed: 11/20/2020  [employer] Patient Education © 2021 [employer]
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
## 2021-09-24 ENCOUNTER — LAB (OUTPATIENT)
Dept: LAB | Facility: HOSPITAL | Age: 36
End: 2021-09-24

## 2021-09-24 ENCOUNTER — TELEPHONE (OUTPATIENT)
Dept: NEUROSURGERY | Facility: CLINIC | Age: 36
End: 2021-09-24

## 2021-09-24 DIAGNOSIS — Z30.2 STERILIZATION: ICD-10-CM

## 2021-09-24 LAB — SPERM - POST VASECTOMY: NORMAL

## 2021-09-24 PROCEDURE — 89321 SEMEN ANAL SPERM DETECTION: CPT

## 2021-09-24 NOTE — TELEPHONE ENCOUNTER
Caller: Gagandeep Ordoñez    Relationship to patient: Self    Best call back number: [phone]  Patient is needing: PATIENT STATES THAT HIS APPOINTMENT FOR FUNC. COMP. EVAL FOR RTW ON  9/30/21 @ 9AM    PATIENT IS HOPING TO HAVE AN APPOINTMENT ON THE SAME DAY.  PLEASE CALL PATIENT TO SCHEDULE IF POSSIBLE @ [phone]    THANK YOU

## 2021-09-24 NOTE — TELEPHONE ENCOUNTER
"Caller: Gagandeep Ordoñez    Relationship to patient: Self    Best call back number: [phone]  Patient is needing:     PATIENT CALLED BACK AND STATES \"HE CANNOT BE OFF WORK FOR ANOTHER MONTH, HE MAY NEED TO FIND SOMEONE ELSE\".  I INQUIRED IF HE WANTED ME TO CANCEL HIS APPOINTMENT.  HE STATED \"NO, KEEP IT, I MAY NOT BE ABLE TO FIND SOMEONE ELSE\"    FYI  "

## 2021-09-28 DIAGNOSIS — E11.9 TYPE 2 DIABETES MELLITUS WITHOUT COMPLICATION, WITHOUT LONG-TERM CURRENT USE OF INSULIN (HCC): ICD-10-CM

## 2021-09-28 RX ORDER — ORAL SEMAGLUTIDE 7 MG/1
7 TABLET ORAL DAILY
Qty: 30 TABLET | Refills: 11 | Status: SHIPPED | OUTPATIENT
Start: 2021-09-28 | End: 2022-02-18

## 2021-09-28 NOTE — TELEPHONE ENCOUNTER
PT CALLED STATING [facility] IS ABLE TO FILL HIS RYBELSUS AND HIS INSURANCE WILL COVER IT, WANTS TO KNOW IF DR. PLATA WOULD SWITCH THIS MEDICATION TO [facility] ON [address]    CALLBACK [phone]

## 2021-09-28 NOTE — TELEPHONE ENCOUNTER
On 9/21 the patient called and stated that insurance would not pay for this medication and that the patient wanted to hold off on trying anything. Review the telephone encounter on 9/21.

## 2021-09-28 NOTE — TELEPHONE ENCOUNTER
PATIENT HAS RETURNED CALL,  HE STATED THAT [facility] IN [place] IS WHERE HE WANTS HIS RYBELSUS TO GO TO.  HE STATED THAT INSURANCE WILL COVER IT AT [facility].

## 2021-09-29 ENCOUNTER — TELEPHONE (OUTPATIENT)
Dept: NEUROSURGERY | Facility: CLINIC | Age: 36
End: 2021-09-29

## 2021-09-29 ENCOUNTER — HOSPITAL ENCOUNTER (OUTPATIENT)
Dept: NUTRITION | Facility: HOSPITAL | Age: 36
Discharge: HOME | End: 2021-09-29

## 2021-09-29 VITALS — HEIGHT: 72 IN | WEIGHT: 315 LBS | BODY MASS INDEX: 42.66 KG/M2

## 2021-09-29 NOTE — PROGRESS NOTES
"Adult Outpatient Nutrition  Assessment/PES    Patient Name:  Gagandeep Ordoñez  YOB: 1985  MRN: [id number]    Assessment Date:  9/29/2021        General Info             Today's Session    Person(s) attending today's session  Patient consents to telehealth visit via telephone today       General Information    Preferred Language  English    Lives With  spouse;child(stephen), dependent Spouse and two children (ages 2 and 9)        Physical Findings              Physical Findings    Overall Physical Appearance  obese         Anthropometrics              Anthropometrics    Height  182.9 cm (72\")     Weight  (!) 143 kg (315 lb)        Ideal Body Weight (IBW)    Ideal Body Weight (IBW) (kg)  82.07     % Ideal Body Weight  174.1        Body Mass Index (BMI)    BMI (kg/m2)  42.81         Nutritional Info/Activity             Nutritional Information    What is your motivation to lose weight?  to have long life for his kids    Do you have difficulty chewing food?  No    Functional Status  able to prepare meals;able to purchase food;ambulatory    List any food cravings/trigger foods you have  large steak; ice cream    How often during the day do you find yourself snacking?  typically late night snacks several hrs after dinner before bed.    How often do you eat out and where?  lunch meals are sometimes fast food (burger and fries)    How many times do you drink milk per day?  -- loves milk, uses in cereal if eats brkfst and may drink quantity daily    How many times do you eat fruit per day?  -- likes fruit but not always a regular part of daily diet    How many times do you eat vegetables per day?  -- meals sometimes consist of vegetables, i.e. taco bowls with cauliflower rice and spinach    How many times do you drink juice per day?  0    How many diet sodas do you drink per day?  -- drinks diet sodas occassionally; does drink Gatorade Zero and Powerade Zero    How many times do you drink alcohol per day?  -- does " "drink beer; usually only occassionally, does anticipate having it more frequently when he goes on vacation    How many meals do you eat each day?  2 at least 2, sometimes brkfst as well    How many snacks do you eat each day?  1 sometimes an evening snack examples include cereal (ranges from frosted mini wheats, cheerios, akanksha charms, etc. or celery with peanut butter    What is the biggest challenge you have with your diet?  Portions;Knowledge       Physical Activity    Are you currently involved in an activity/exercise program?   No    Reasons for Inactivity  Other (comment) no specific activity; does play outside with his kids some        Home Nutrition Report              Home Nutrition Report    Typical Food/Fluid Intake  Large intake of red meats (tacos, burgers, pizza, steak)         Estimated/Assessed Needs              Calculation Measurements    Weight Used For Calculations  143 kg (315 lb)     Height  182.9 cm (72\")        Estimated/Assessed Needs    Additional Documentation  Glasscock-St. Jeor Equation (Group);Fluid Requirements (Group);Protein Requirements (Group)        Glasscock-St. Jeor Equation    RMR (Glasscock-St. Jeor Equation)  2391.83     Glasscock-St. NanoViricidesor Activity Factors  -- MSF *1.4 -1000 kcal/d for wt loss = 2348 kcal/d (range 9670-8208 kcal/d recommended)        Protein Requirements    Weight Used For Protein Calculations  143 kg (315 lb)     Est Protein Requirement Amount (gms/kg)  0.8 gm protein     Estimated Protein Requirements (gms/day)  114.31        Fluid Requirements    Fluid Requirements (mL/day)  2400     Estimated Fluid Requirement Method  RDA Method     RDA Method (mL)  2400           Labs/Tests/Procedures/Meds              Labs/Procedures/Meds    Lab Results Reviewed  reviewed     Lab Results Comments  reports his last A1C was >10        Diagnostic Tests/Procedures    Diagnostic Test/Procedure Reviewed  reviewed     Diagnostic Test/Procedures Comments  s/p cholecystectomy        " Medications    Pertinent Medications Reviewed  reviewed     Pertinent Medications Comments  Rybelsus             Problem/Interventions:  Problem 1              Nutrition Diagnoses Problem 1    Problem 1  Overweight/Obesity     Etiology (related to)  MNT for Treatment/Condition;Medical Diagnosis;Factors Affecting Nutrition     Endocrine  DM2     Other  lack of prior exposure to nutrition information regarding appropriate portion sizes and healthy eating pattern; excess fat-calorie intake, excess red meat intake     Signs/Symptoms (evidenced by)  BMI;Biochemical     BMI  Greater than 40     Specific Labs Noted  HgbA1C           Intervention Goal              Intervention Goal    General  Improved nutrition related lab(s);Meet nutritional needs for age/condition;Disease management/therapy     PO  Other (comment) reduce portion sizes     Weight  Appropriate weight loss           Nutrition Prescription              Nutrition Prescription PO    Common Modifiers  Cardiac;Consistent Carbohydrate         Education/Evaluation              Education    Education  Provided education regarding;Education topics;Advised regarding habits/behavior     Provided education regarding  Avoidance of associated complications;Diet rationale;Healthy eating for diabetes;Preventative health;Key food habit change     Education Topics  Basic nutrition;Cardiac diabetic;CHO counting;Diabetes;Fiber;Low fat     CHO (gm/day)  -- 4-5 CHO servings/meal     Advised Regarding Habits/Behavior  Activity;Food prep;Seasoning food;Appropriate beverage;Self monitor;Appropriate portions;Snacks;Increased nutrient density;Eating pattern;Food choices;Meal planning        Monitor/Evaluation    Monitor  Per protocol     Education Follow-up  [employer] PRN written materials and RDN contact info mailed to address provided by Pt. Pt would like a follow up appointment.           Electronically signed by:  Senait Tinajero RDN, WILL  09/29/21 16:51 CDT

## 2021-09-29 NOTE — TELEPHONE ENCOUNTER
I have talked with Dr Quiroga and he states the patient needs to get the functional capacity testing and once this has been completed he will review it & if it is normal he will release him to RTW.    I contacted the patient's wife and she is aware of the above.  She states he is scheduled @ [facility] on 9/30/21 b/c that is where his insurance told him to go.  She will have the patient bring me the form once it has been completed tomorrow & I will have Dr Quiroga or Carlos review it to see if he can be released.  I told her that once this has been obtained I would call her.  I did give her my direct contact information.  She was thankful for the call and information.      wes la CMA

## 2021-09-30 NOTE — TELEPHONE ENCOUNTER
I have contacted the patient to let him know Carlos has signed off of the functional capacity examination that he had done today.  I also have a letter that states he can be released to RTW without restrictions on 10/4/2021.  The patient is leaving to go out of town on vacation at 3 am tomorrow morning and requested that I mail this paperwork to him.  I have scanned this into the chart and will mail it to him.    wes la CMA

## 2021-10-11 ENCOUNTER — TELEPHONE (OUTPATIENT)
Dept: NEUROSURGERY | Facility: CLINIC | Age: 36
End: 2021-10-11

## 2021-10-11 NOTE — TELEPHONE ENCOUNTER
Patient called and left a voicemail stating he had not received the RTW letter that was mailed to him by our office.  He said he could come by and  a copy today if needed.  I have attempted to call him but had to leave a voicemail.  I told him he could come by and  a copy in the front office or see if it came in the mail today, whichever he needs to do would be okay.      wes la CMA

## 2021-10-20 ENCOUNTER — TELEPHONE (OUTPATIENT)
Dept: INTERNAL MEDICINE | Facility: CLINIC | Age: 36
End: 2021-10-20

## 2021-10-20 NOTE — TELEPHONE ENCOUNTER
Would you mind getting a little more information on what he is wanting to do as far as diet? Thank you!

## 2021-10-20 NOTE — TELEPHONE ENCOUNTER
PATIENT HAS BEEN CALLED,  HE STATED THAT HIS WIFE IS GETTING READY TO HAVE GASTRIC BYPASS AND WILL BE ON A LIQUID DIET FOR 2 WEEKS  (PROTEIN SHAKES, ETC).   HE STATED THAT HE WAS WANTING TO DO THIS WITH HER.   HE IS ASKING IF THIS WOULD BE A PROBLEM WITH HIS MEDICATIONS.

## 2021-10-20 NOTE — TELEPHONE ENCOUNTER
Caller: Gagandeep Ordoñez    Relationship: Self    Best call back number: [phone]        Who are you requesting to speak with (clinical staff, provider,  specific staff member): CLINICAL STAFF    What was the call regarding:PATIENT WOULD LIKE TO DISCUSS STARTING A DIET AND HOW THAT WOULD EFFECT HIS MEDICATIONS.     PLEASE CONTACT

## 2021-10-20 NOTE — TELEPHONE ENCOUNTER
This should be fine.  Just want him to watch his blood glucose levels.  If he is dropping too low, let us know.  Also, as he loses weight, all medications may need to be adjusted.  Thank you!

## 2021-10-27 ENCOUNTER — OFFICE VISIT (OUTPATIENT)
Dept: NEUROSURGERY | Facility: CLINIC | Age: 36
End: 2021-10-27

## 2021-10-27 VITALS — WEIGHT: 314 LBS | BODY MASS INDEX: 42.53 KG/M2 | HEIGHT: 72 IN

## 2021-10-27 DIAGNOSIS — S32.009A CLOSED FRACTURE OF TRANSVERSE PROCESS OF LUMBAR VERTEBRA, INITIAL ENCOUNTER (HCC): Primary | ICD-10-CM

## 2021-10-27 DIAGNOSIS — E66.01 CLASS 3 SEVERE OBESITY DUE TO EXCESS CALORIES WITH BODY MASS INDEX (BMI) OF 40.0 TO 44.9 IN ADULT, UNSPECIFIED WHETHER SERIOUS COMORBIDITY PRESENT (HCC): ICD-10-CM

## 2021-10-27 PROCEDURE — 99215 OFFICE O/P EST HI 40 MIN: CPT | Performed by: NEUROLOGICAL SURGERY
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
## 2021-12-29 ENCOUNTER — OFFICE VISIT (OUTPATIENT)
Dept: INTERNAL MEDICINE | Facility: CLINIC | Age: 36
End: 2021-12-29

## 2021-12-29 VITALS
HEART RATE: 75 BPM | DIASTOLIC BLOOD PRESSURE: 87 MMHG | BODY MASS INDEX: 42.66 KG/M2 | WEIGHT: 315 LBS | HEIGHT: 72 IN | RESPIRATION RATE: 15 BRPM | OXYGEN SATURATION: 98 % | TEMPERATURE: 97.5 F | SYSTOLIC BLOOD PRESSURE: 127 MMHG

## 2021-12-29 DIAGNOSIS — E78.1 HYPERTRIGLYCERIDEMIA: ICD-10-CM

## 2021-12-29 DIAGNOSIS — K21.9 GASTROESOPHAGEAL REFLUX DISEASE, UNSPECIFIED WHETHER ESOPHAGITIS PRESENT: ICD-10-CM

## 2021-12-29 DIAGNOSIS — I10 ESSENTIAL HYPERTENSION: ICD-10-CM

## 2021-12-29 DIAGNOSIS — E11.9 TYPE 2 DIABETES MELLITUS WITHOUT COMPLICATION, WITHOUT LONG-TERM CURRENT USE OF INSULIN (HCC): Primary | ICD-10-CM

## 2021-12-29 DIAGNOSIS — Z56.6 STRESS AT WORK: ICD-10-CM

## 2021-12-29 LAB
EXPIRATION DATE: ABNORMAL
HBA1C MFR BLD: 8 %
Lab: ABNORMAL

## 2021-12-29 PROCEDURE — 83036 HEMOGLOBIN GLYCOSYLATED A1C: CPT | Performed by: INTERNAL MEDICINE

## 2021-12-29 PROCEDURE — 99214 OFFICE O/P EST MOD 30 MIN: CPT | Performed by: INTERNAL MEDICINE

## 2021-12-29 RX ORDER — FLUOXETINE HYDROCHLORIDE 20 MG/1
20 CAPSULE ORAL DAILY
Qty: 90 CAPSULE | Refills: 3 | Status: SHIPPED | OUTPATIENT
Start: 2021-12-29 | End: 2022-09-12 | Stop reason: DRUGHIGH

## 2021-12-29 RX ORDER — HYDROCHLOROTHIAZIDE 50 MG/1
50 TABLET ORAL DAILY
Qty: 90 TABLET | Refills: 3 | Status: SHIPPED | OUTPATIENT
Start: 2021-12-29 | End: 2023-01-12

## 2021-12-29 RX ORDER — LOSARTAN POTASSIUM 100 MG/1
100 TABLET ORAL DAILY
Qty: 90 TABLET | Refills: 3 | Status: SHIPPED | OUTPATIENT
Start: 2021-12-29 | End: 2022-03-16 | Stop reason: SDUPTHER

## 2021-12-29 RX ORDER — AMLODIPINE BESYLATE 10 MG/1
10 TABLET ORAL DAILY
Qty: 90 TABLET | Refills: 3 | Status: SHIPPED | OUTPATIENT
Start: 2021-12-29 | End: 2023-01-12

## 2021-12-29 RX ORDER — OMEPRAZOLE 40 MG/1
40 CAPSULE, DELAYED RELEASE ORAL DAILY
Qty: 90 CAPSULE | Refills: 3 | Status: SHIPPED | OUTPATIENT
Start: 2021-12-29 | End: 2023-01-12

## 2021-12-29 SDOH — HEALTH STABILITY - MENTAL HEALTH: OTHER PHYSICAL AND MENTAL STRAIN RELATED TO WORK: Z56.6

## 2021-12-29 NOTE — PATIENT INSTRUCTIONS
-Please get your fasting labs 2 to 3 days prior to your next visit so we can discuss them during that visit.

## 2021-12-29 NOTE — PROGRESS NOTES
Chief Complaint   Patient presents with   • Follow-up   • Diabetes         History:  Gagandeep Ordoñez is a 36 y.o. male who presents today for evaluation of the above problems.    He presents today for a 6-month follow-up on his diabetes and hypertension.  Since the last visit he had a mechanical fall sustaining a fracture of transverse process of the lumbar vertebra.  He has seen Dr. Quiroga.  This is a nonsurgical injury, and his pain has completely resolved.    Back in March his A1c was 6.8%.  Today, it is 8.0%.  He says blood sugar was typically 120s to 140 at home but has been higher due to the holidays.  Since last visit he is also gained about 15 pounds.  Is compliant with Metformin 1000 mg twice a day and Rybelsus 7 mg daily    Denies any new issues or complaints.  HPI      ROS:  Review of Systems     See above      Current Outpatient Medications:   •  acetaminophen (Tylenol) 325 MG tablet, Take 3 tablets by mouth Every 8 (Eight) Hours. Take every 8 hours for 3 days then take prn as needed., Disp: 100 tablet, Rfl: 2  •  amLODIPine (NORVASC) 10 MG tablet, Take 1 tablet by mouth Daily., Disp: 90 tablet, Rfl: 3  •  diphenoxylate-atropine (LOMOTIL) 2.5-0.025 MG per tablet, Take 1 tablet by mouth As Needed., Disp: , Rfl:   •  FLUoxetine (PROzac) 20 MG capsule, Take 1 capsule by mouth Daily., Disp: 90 capsule, Rfl: 3  •  hydroCHLOROthiazide (HYDRODIURIL) 50 MG tablet, Take 1 tablet by mouth Daily., Disp: 90 tablet, Rfl: 3  •  ibuprofen (Motrin IB) 200 MG tablet, Take 3 tablets by mouth Every 8 (Eight) Hours. Take every 8 hours for three days then take as needed., Disp: 100 tablet, Rfl: 2  •  losartan (COZAAR) 100 MG tablet, Take 1 tablet by mouth Daily., Disp: 90 tablet, Rfl: 3  •  metFORMIN (GLUCOPHAGE) 1000 MG tablet, Take 1 tablet by mouth 2 (Two) Times a Day With Meals., Disp: 180 tablet, Rfl: 3  •  omeprazole (priLOSEC) 40 MG capsule, Take 1 capsule by mouth Daily., Disp: 90 capsule, Rfl: 3  •  ondansetron  (Zofran) 4 MG tablet, Take 1 tablet by mouth Every 8 (Eight) Hours As Needed for Nausea., Disp: 15 tablet, Rfl: 0  •  Semaglutide (Rybelsus) 7 MG tablet, Take 7 mg by mouth Daily., Disp: 30 tablet, Rfl: 11  •  True Metrix Blood Glucose Test test strip, TEST BLOOD SUGAR FOUR TIMES DAILY, Disp: 100 each, Rfl: 11    Lab Results   Component Value Date    GLUCOSE 252 (H) 08/31/2021    BUN 14 08/31/2021    CREATININE 0.85 08/31/2021    EGFRIFNONA 101 08/31/2021    BCR 16.5 08/31/2021    K 4.0 08/31/2021    CO2 24.0 08/31/2021    CALCIUM 9.3 08/31/2021    ALBUMIN 4.30 08/31/2021    AST 30 08/31/2021    ALT 41 08/31/2021       WBC   Date Value Ref Range Status   08/31/2021 13.52 (H) 3.40 - 10.80 10*3/mm3 Final   02/20/2019 10.3 4.8 - 10.8 K/uL Final     RBC   Date Value Ref Range Status   08/31/2021 4.85 4.14 - 5.80 10*6/mm3 Final   02/20/2019 5.25 4.70 - 6.10 M/uL Final     Hemoglobin   Date Value Ref Range Status   08/31/2021 13.6 13.0 - 17.7 g/dL Final   02/20/2019 15.1 14.0 - 18.0 g/dL Final     Hematocrit   Date Value Ref Range Status   08/31/2021 40.4 37.5 - 51.0 % Final   02/20/2019 45.6 42.0 - 52.0 % Final     MCV   Date Value Ref Range Status   08/31/2021 83.3 79.0 - 97.0 fL Final   02/20/2019 86.9 80.0 - 94.0 fL Final     MCH   Date Value Ref Range Status   08/31/2021 28.0 26.6 - 33.0 pg Final   02/20/2019 28.8 27.0 - 31.0 pg Final     MCHC   Date Value Ref Range Status   08/31/2021 33.7 31.5 - 35.7 g/dL Final   02/20/2019 33.1 33.0 - 37.0 g/dL Final     RDW   Date Value Ref Range Status   08/31/2021 13.2 12.3 - 15.4 % Final   02/20/2019 12.9 11.5 - 14.5 % Final     RDW-SD   Date Value Ref Range Status   08/31/2021 39.6 37.0 - 54.0 fl Final     MPV   Date Value Ref Range Status   08/31/2021 10.9 6.0 - 12.0 fL Final   02/20/2019 11.2 9.4 - 12.4 fL Final     Platelets   Date Value Ref Range Status   08/31/2021 313 140 - 450 10*3/mm3 Final   02/20/2019 292 130 - 400 K/uL Final     Neutrophil Rel %   Date Value Ref  Range Status   02/20/2019 56.4 50.0 - 65.0 % Final     Neutrophil %   Date Value Ref Range Status   08/31/2021 75.6 42.7 - 76.0 % Final     Lymphocyte Rel %   Date Value Ref Range Status   02/20/2019 33.2 20.0 - 40.0 % Final     Lymphocyte %   Date Value Ref Range Status   08/31/2021 16.1 (L) 19.6 - 45.3 % Final     Monocyte Rel %   Date Value Ref Range Status   02/20/2019 7.9 0.0 - 10.0 % Final     Monocyte %   Date Value Ref Range Status   08/31/2021 5.3 5.0 - 12.0 % Final     Eosinophil Rel %   Date Value Ref Range Status   02/20/2019 1.4 0.0 - 5.0 % Final     Eosinophil %   Date Value Ref Range Status   08/31/2021 0.9 0.3 - 6.2 % Final     Basophil Rel %   Date Value Ref Range Status   02/20/2019 0.7 0.0 - 1.0 % Final     Basophil %   Date Value Ref Range Status   08/31/2021 0.5 0.0 - 1.5 % Final     Immature Grans %   Date Value Ref Range Status   08/31/2021 1.6 (H) 0.0 - 0.5 % Final     Neutrophils Absolute   Date Value Ref Range Status   02/20/2019 5.8 1.5 - 7.5 K/uL Final     Neutrophils, Absolute   Date Value Ref Range Status   08/31/2021 10.23 (H) 1.70 - 7.00 10*3/mm3 Final     Lymphocytes Absolute   Date Value Ref Range Status   02/20/2019 3.4 1.1 - 4.5 K/uL Final     Lymphocytes, Absolute   Date Value Ref Range Status   08/31/2021 2.18 0.70 - 3.10 10*3/mm3 Final     Monocytes Absolute   Date Value Ref Range Status   02/20/2019 0.80 0.00 - 0.90 K/uL Final     Monocytes, Absolute   Date Value Ref Range Status   08/31/2021 0.71 0.10 - 0.90 10*3/mm3 Final     Eosinophils Absolute   Date Value Ref Range Status   02/20/2019 0.10 0.00 - 0.60 K/uL Final     Eosinophils, Absolute   Date Value Ref Range Status   08/31/2021 0.12 0.00 - 0.40 10*3/mm3 Final     Basophils Absolute   Date Value Ref Range Status   02/20/2019 0.10 0.00 - 0.20 K/uL Final     Basophils, Absolute   Date Value Ref Range Status   08/31/2021 0.07 0.00 - 0.20 10*3/mm3 Final     Immature Grans, Absolute   Date Value Ref Range Status   08/31/2021  "0.21 (H) 0.00 - 0.05 10*3/mm3 Final     [facility]   Date Value Ref Range Status   08/31/2021 0.0 0.0 - 0.2 /100 WBC Final         OBJECTIVE:  Visit Vitals  /87 (BP Location: Left arm, Patient Position: Sitting, Cuff Size: Adult)   Pulse 75   Temp 97.5 °F (36.4 °C) (Oral)   Resp 15   Ht 182.9 cm (72.01\")   Wt (!) 150 kg (330 lb)   SpO2 98%   BMI 44.75 kg/m²      Physical Exam  Vitals and nursing note reviewed.   Constitutional:       General: He is not in acute distress.     Appearance: He is well-developed. He is not ill-appearing or toxic-appearing.      Comments: Pleasant   HENT:      Head: Normocephalic and atraumatic.   Eyes:      Pupils: Pupils are equal, round, and reactive to light.   Neck:      Thyroid: No thyromegaly.      Trachea: Phonation normal.   Pulmonary:      Effort: No respiratory distress.   Skin:     Coloration: Skin is not pale.      Findings: No erythema.   Neurological:      Mental Status: He is alert.   Psychiatric:         Behavior: Behavior normal.         Thought Content: Thought content normal.         Judgment: Judgment normal.         Assessment/Plan    Diagnoses and all orders for this visit:    1. Type 2 diabetes mellitus without complication, without long-term current use of insulin (HCC) (Primary)  -     metFORMIN (GLUCOPHAGE) 1000 MG tablet; Take 1 tablet by mouth 2 (Two) Times a Day With Meals.  Dispense: 180 tablet; Refill: 3  -     POC Glycosylated Hemoglobin (Hb A1C)  -     Hemoglobin A1c; Future    2. Essential hypertension  -     losartan (COZAAR) 100 MG tablet; Take 1 tablet by mouth Daily.  Dispense: 90 tablet; Refill: 3  -     hydroCHLOROthiazide (HYDRODIURIL) 50 MG tablet; Take 1 tablet by mouth Daily.  Dispense: 90 tablet; Refill: 3  -     amLODIPine (NORVASC) 10 MG tablet; Take 1 tablet by mouth Daily.  Dispense: 90 tablet; Refill: 3    3. Hypertriglyceridemia  -     Lipid Panel; Future    4. Gastroesophageal reflux disease, unspecified whether esophagitis present  -   "   omeprazole (priLOSEC) 40 MG capsule; Take 1 capsule by mouth Daily.  Dispense: 90 capsule; Refill: 3    5. Stress at work  -     FLUoxetine (PROzac) 20 MG capsule; Take 1 capsule by mouth Daily.  Dispense: 90 capsule; Refill: 3      His diabetes is uncontrolled with an A1c of 8.0%.  This is increased compared to March 2021.  After discussion with him and he would like to focus more on diet and exercise rather than starting a new medication.  This is a reasonable plan.  We will recheck his A1c in 3 months.  If at that time his A1c is still uncontrolled we may need to start SGLT2 inhibitor such as Jardiance    Blood pressure is controlled we will continue his current regimen    I would like to get a lipid panel couple days prior to his next visit in 3 months for hypertriglyceridemia.  These will be fasting labs    Refill his Prozac, Prilosec, amlodipine, Norvasc, and Cozaar.    He has received 2 Moderna COVID-19 vaccines.  Counseled on getting the Moderna booster as he is eligible.    Follow-up 3 months for annual physical with lipid panel and A1c a couple days prior.  Follow-up sooner if indicated.    Return in about 3 months (around 3/29/2022) for Annual physical.      CHARMAINE Sanchez MD  08:09 CST  12/29/2021

## 2022-02-18 DIAGNOSIS — E11.9 TYPE 2 DIABETES MELLITUS WITHOUT COMPLICATION, WITHOUT LONG-TERM CURRENT USE OF INSULIN: ICD-10-CM

## 2022-02-18 RX ORDER — ORAL SEMAGLUTIDE 7 MG/1
TABLET ORAL
Qty: 30 TABLET | Refills: 5 | Status: SHIPPED | OUTPATIENT
Start: 2022-02-18 | End: 2022-04-01 | Stop reason: SDUPTHER

## 2022-03-16 RX ORDER — LOSARTAN POTASSIUM 50 MG/1
TABLET ORAL
Qty: 60 TABLET | Refills: 5 | Status: SHIPPED | OUTPATIENT
Start: 2022-03-16 | End: 2022-09-15

## 2022-04-01 ENCOUNTER — OFFICE VISIT (OUTPATIENT)
Dept: INTERNAL MEDICINE | Facility: CLINIC | Age: 37
End: 2022-04-01

## 2022-04-01 VITALS
TEMPERATURE: 98.6 F | RESPIRATION RATE: 15 BRPM | BODY MASS INDEX: 41.72 KG/M2 | SYSTOLIC BLOOD PRESSURE: 135 MMHG | HEART RATE: 71 BPM | WEIGHT: 308 LBS | OXYGEN SATURATION: 98 % | HEIGHT: 72 IN | DIASTOLIC BLOOD PRESSURE: 70 MMHG

## 2022-04-01 DIAGNOSIS — E11.9 TYPE 2 DIABETES MELLITUS WITHOUT COMPLICATION, WITHOUT LONG-TERM CURRENT USE OF INSULIN: ICD-10-CM

## 2022-04-01 DIAGNOSIS — I10 ESSENTIAL HYPERTENSION: ICD-10-CM

## 2022-04-01 DIAGNOSIS — Z13.31 DEPRESSION SCREEN: ICD-10-CM

## 2022-04-01 DIAGNOSIS — Z00.00 ENCOUNTER FOR PREVENTIVE CARE: Primary | ICD-10-CM

## 2022-04-01 DIAGNOSIS — Z11.59 NEED FOR HEPATITIS C SCREENING TEST: ICD-10-CM

## 2022-04-01 DIAGNOSIS — E66.01 CLASS 3 SEVERE OBESITY DUE TO EXCESS CALORIES WITH SERIOUS COMORBIDITY AND BODY MASS INDEX (BMI) OF 40.0 TO 44.9 IN ADULT: ICD-10-CM

## 2022-04-01 PROCEDURE — 99395 PREV VISIT EST AGE 18-39: CPT | Performed by: INTERNAL MEDICINE

## 2022-04-01 RX ORDER — ORAL SEMAGLUTIDE 7 MG/1
1 TABLET ORAL DAILY
Qty: 30 TABLET | Refills: 5 | Status: SHIPPED | OUTPATIENT
Start: 2022-04-01 | End: 2022-10-10 | Stop reason: SDUPTHER

## 2022-04-01 NOTE — PROGRESS NOTES
Chief Complaint   Patient presents with   • Annual Exam         History:  Gagandeep Ordoñez is a 36 y.o. male who presents today for evaluation of the above problems.      Presents for his annual physical.  Since last visit he has lost 22 pounds in 3 months by decreasing carbohydrates, increasing fruits vegetables and eating smaller portions.  He is trying to walk more as well.  His blood sugar has been typically 120-140 at home    He just recently had his diabetic eye exam.  He has a dental exam later this month    He occasionally drinks alcohol but denies tobacco use or recreational drug use.    Denies any new issues or complaints    HPI    Social History     Socioeconomic History   • Marital status:    Tobacco Use   • Smoking status: Never Smoker   • Smokeless tobacco: Former User   Vaping Use   • Vaping Use: Never used   Substance and Sexual Activity   • Alcohol use: Yes     Comment: Rarely   • Drug use: No   • Sexual activity: Yes     Partners: Female       PHQ-9 Depression Screening  Little interest or pleasure in doing things? 0-->not at all   Feeling down, depressed, or hopeless? 0-->not at all   Trouble falling or staying asleep, or sleeping too much?     Feeling tired or having little energy?     Poor appetite or overeating?     Feeling bad about yourself - or that you are a failure or have let yourself or your family down?     Trouble concentrating on things, such as reading the newspaper or watching television?     Moving or speaking so slowly that other people could have noticed? Or the opposite - being so fidgety or restless that you have been moving around a lot more than usual?     Thoughts that you would be better off dead, or of hurting yourself in some way?     PHQ-9 Total Score 0   If you checked off any problems, how difficult have these problems made it for you to do your work, take care of things at home, or get along with other people?         PHQ-9 Total Score: 0    ROS:  Review of  Systems   Constitutional: Negative for activity change, appetite change, fatigue, fever and unexpected weight change.   HENT: Negative.  Negative for sore throat and trouble swallowing.    Eyes: Negative for pain and visual disturbance.   Respiratory: Negative for cough, chest tightness and shortness of breath.    Cardiovascular: Negative for chest pain, palpitations and leg swelling.   Gastrointestinal: Negative for abdominal pain, constipation, diarrhea, nausea and vomiting.   Endocrine: Negative for cold intolerance and heat intolerance.   Genitourinary: Negative.    Musculoskeletal: Negative.  Negative for back pain, neck pain and neck stiffness.   Skin: Negative for rash and wound.   Neurological: Negative for dizziness, syncope, weakness, light-headedness and headaches.   Hematological: Negative for adenopathy. Does not bruise/bleed easily.   Psychiatric/Behavioral: Negative for agitation, behavioral problems and confusion.         Current Outpatient Medications:   •  acetaminophen (Tylenol) 325 MG tablet, Take 3 tablets by mouth Every 8 (Eight) Hours. Take every 8 hours for 3 days then take prn as needed., Disp: 100 tablet, Rfl: 2  •  amLODIPine (NORVASC) 10 MG tablet, Take 1 tablet by mouth Daily., Disp: 90 tablet, Rfl: 3  •  diphenoxylate-atropine (LOMOTIL) 2.5-0.025 MG per tablet, Take 1 tablet by mouth As Needed., Disp: , Rfl:   •  FLUoxetine (PROzac) 20 MG capsule, Take 1 capsule by mouth Daily., Disp: 90 capsule, Rfl: 3  •  hydroCHLOROthiazide (HYDRODIURIL) 50 MG tablet, Take 1 tablet by mouth Daily., Disp: 90 tablet, Rfl: 3  •  ibuprofen (Motrin IB) 200 MG tablet, Take 3 tablets by mouth Every 8 (Eight) Hours. Take every 8 hours for three days then take as needed., Disp: 100 tablet, Rfl: 2  •  losartan (COZAAR) 50 MG tablet, TAKE 2 TABLETS BY MOUTH ONCE DAILY, Disp: 60 tablet, Rfl: 5  •  metFORMIN (GLUCOPHAGE) 1000 MG tablet, Take 1 tablet by mouth 2 (Two) Times a Day With Meals., Disp: 180 tablet,  Rfl: 3  •  omeprazole (priLOSEC) 40 MG capsule, Take 1 capsule by mouth Daily., Disp: 90 capsule, Rfl: 3  •  ondansetron (Zofran) 4 MG tablet, Take 1 tablet by mouth Every 8 (Eight) Hours As Needed for Nausea., Disp: 15 tablet, Rfl: 0  •  Semaglutide (Rybelsus) 7 MG tablet, Take 7 mg by mouth Daily., Disp: 30 tablet, Rfl: 5  •  True Metrix Blood Glucose Test test strip, TEST BLOOD SUGAR FOUR TIMES DAILY, Disp: 100 each, Rfl: 11    Lab Results   Component Value Date    GLUCOSE 252 (H) 08/31/2021    BUN 14 08/31/2021    CREATININE 0.85 08/31/2021    EGFRIFNONA 101 08/31/2021    BCR 16.5 08/31/2021    K 4.0 08/31/2021    CO2 24.0 08/31/2021    CALCIUM 9.3 08/31/2021    ALBUMIN 4.30 08/31/2021    AST 30 08/31/2021    ALT 41 08/31/2021       WBC   Date Value Ref Range Status   08/31/2021 13.52 (H) 3.40 - 10.80 10*3/mm3 Final   02/20/2019 10.3 4.8 - 10.8 K/uL Final     RBC   Date Value Ref Range Status   08/31/2021 4.85 4.14 - 5.80 10*6/mm3 Final   02/20/2019 5.25 4.70 - 6.10 M/uL Final     Hemoglobin   Date Value Ref Range Status   08/31/2021 13.6 13.0 - 17.7 g/dL Final   02/20/2019 15.1 14.0 - 18.0 g/dL Final     Hematocrit   Date Value Ref Range Status   08/31/2021 40.4 37.5 - 51.0 % Final   02/20/2019 45.6 42.0 - 52.0 % Final     MCV   Date Value Ref Range Status   08/31/2021 83.3 79.0 - 97.0 fL Final   02/20/2019 86.9 80.0 - 94.0 fL Final     MCH   Date Value Ref Range Status   08/31/2021 28.0 26.6 - 33.0 pg Final   02/20/2019 28.8 27.0 - 31.0 pg Final     MCHC   Date Value Ref Range Status   08/31/2021 33.7 31.5 - 35.7 g/dL Final   02/20/2019 33.1 33.0 - 37.0 g/dL Final     RDW   Date Value Ref Range Status   08/31/2021 13.2 12.3 - 15.4 % Final   02/20/2019 12.9 11.5 - 14.5 % Final     RDW-SD   Date Value Ref Range Status   08/31/2021 39.6 37.0 - 54.0 fl Final     MPV   Date Value Ref Range Status   08/31/2021 10.9 6.0 - 12.0 fL Final   02/20/2019 11.2 9.4 - 12.4 fL Final     Platelets   Date Value Ref Range Status    08/31/2021 313 140 - 450 10*3/mm3 Final   02/20/2019 292 130 - 400 K/uL Final     Neutrophil Rel %   Date Value Ref Range Status   02/20/2019 56.4 50.0 - 65.0 % Final     Neutrophil %   Date Value Ref Range Status   08/31/2021 75.6 42.7 - 76.0 % Final     Lymphocyte Rel %   Date Value Ref Range Status   02/20/2019 33.2 20.0 - 40.0 % Final     Lymphocyte %   Date Value Ref Range Status   08/31/2021 16.1 (L) 19.6 - 45.3 % Final     Monocyte Rel %   Date Value Ref Range Status   02/20/2019 7.9 0.0 - 10.0 % Final     Monocyte %   Date Value Ref Range Status   08/31/2021 5.3 5.0 - 12.0 % Final     Eosinophil Rel %   Date Value Ref Range Status   02/20/2019 1.4 0.0 - 5.0 % Final     Eosinophil %   Date Value Ref Range Status   08/31/2021 0.9 0.3 - 6.2 % Final     Basophil Rel %   Date Value Ref Range Status   02/20/2019 0.7 0.0 - 1.0 % Final     Basophil %   Date Value Ref Range Status   08/31/2021 0.5 0.0 - 1.5 % Final     Immature Grans %   Date Value Ref Range Status   08/31/2021 1.6 (H) 0.0 - 0.5 % Final     Neutrophils Absolute   Date Value Ref Range Status   02/20/2019 5.8 1.5 - 7.5 K/uL Final     Neutrophils, Absolute   Date Value Ref Range Status   08/31/2021 10.23 (H) 1.70 - 7.00 10*3/mm3 Final     Lymphocytes Absolute   Date Value Ref Range Status   02/20/2019 3.4 1.1 - 4.5 K/uL Final     Lymphocytes, Absolute   Date Value Ref Range Status   08/31/2021 2.18 0.70 - 3.10 10*3/mm3 Final     Monocytes Absolute   Date Value Ref Range Status   02/20/2019 0.80 0.00 - 0.90 K/uL Final     Monocytes, Absolute   Date Value Ref Range Status   08/31/2021 0.71 0.10 - 0.90 10*3/mm3 Final     Eosinophils Absolute   Date Value Ref Range Status   02/20/2019 0.10 0.00 - 0.60 K/uL Final     Eosinophils, Absolute   Date Value Ref Range Status   08/31/2021 0.12 0.00 - 0.40 10*3/mm3 Final     Basophils Absolute   Date Value Ref Range Status   02/20/2019 0.10 0.00 - 0.20 K/uL Final     Basophils, Absolute   Date Value Ref Range  "Status   08/31/2021 0.07 0.00 - 0.20 10*3/mm3 Final     Immature Grans, Absolute   Date Value Ref Range Status   08/31/2021 0.21 (H) 0.00 - 0.05 10*3/mm3 Final     nRBC   Date Value Ref Range Status   08/31/2021 0.0 0.0 - 0.2 /100 WBC Final         OBJECTIVE:  Visit Vitals  /70 (BP Location: Left arm, Patient Position: Sitting, Cuff Size: Adult)   Pulse 71   Temp 98.6 °F (37 °C) (Oral)   Resp 15   Ht 182.9 cm (72.01\")   Wt (!) 140 kg (308 lb)   SpO2 98%   BMI 41.76 kg/m²      Physical Exam  Vitals and nursing note reviewed.   Constitutional:       General: He is not in acute distress.     Appearance: Normal appearance. He is well-developed. He is not ill-appearing.      Comments: Pleasant   HENT:      Head: Normocephalic and atraumatic.      Right Ear: Tympanic membrane, ear canal and external ear normal. There is no impacted cerumen.      Left Ear: Tympanic membrane, ear canal and external ear normal. There is no impacted cerumen.      Mouth/Throat:      Pharynx: No oropharyngeal exudate.   Eyes:      General: No scleral icterus.     Conjunctiva/sclera: Conjunctivae normal.      Pupils: Pupils are equal, round, and reactive to light.   Neck:      Thyroid: No thyromegaly.      Vascular: No JVD.   Cardiovascular:      Rate and Rhythm: Normal rate and regular rhythm.      Heart sounds: Normal heart sounds. No murmur heard.    No friction rub. No gallop.   Pulmonary:      Effort: Pulmonary effort is normal. No respiratory distress.      Breath sounds: Normal breath sounds. No stridor. No wheezing, rhonchi or rales.   Abdominal:      General: Bowel sounds are normal. There is no distension.      Palpations: Abdomen is soft.      Tenderness: There is no abdominal tenderness.   Musculoskeletal:      Right lower leg: No edema.      Left lower leg: No edema.   Skin:     General: Skin is warm and dry.      Coloration: Skin is not jaundiced.   Neurological:      Mental Status: He is alert.      Cranial Nerves: No " cranial nerve deficit.   Psychiatric:         Mood and Affect: Mood normal.         Behavior: Behavior normal.         Thought Content: Thought content normal.         Judgment: Judgment normal.         Assessment/Plan    Diagnoses and all orders for this visit:    1. Encounter for preventive care (Primary)  -     CBC (No Diff); Future  -     Hemoglobin A1c; Future  -     Comprehensive Metabolic Panel; Future  -     Lipid Panel; Future  -     Hepatitis C Antibody; Future    2. Depression screen    3. Type 2 diabetes mellitus without complication, without long-term current use of insulin (HCC)  -     Semaglutide (Rybelsus) 7 MG tablet; Take 7 mg by mouth Daily.  Dispense: 30 tablet; Refill: 5  -     Hemoglobin A1c; Future  -     Microalbumin / Creatinine Urine Ratio - Urine, Clean Catch; Future    4. Need for hepatitis C screening test  -     Hepatitis C Antibody; Future    5. Essential hypertension    6. Class 3 severe obesity due to excess calories with serious comorbidity and body mass index (BMI) of 40.0 to 44.9 in adult ([facility])      I would like to get the above blood work today.  Depression screen was negative with a PHQ 2 of 0.  His blood pressure is controlled we will continue his current regimen.  I believe his diabetes is going to be better controlled.  Refill Rybelsus 7 mg.  Check A1c, but if his A1c is still uncontrolled we can increase to 14 mg    He is going to come back next week for fasting labs.  He has hypertriglyceridemia and will require fasting blood work    Follow-up in 6 months with repeat A1c in the office.  Follow-up sooner if indicated.    Counseling/Anticipatory Guidance Discussed: nutrition, physical activity, healthy weight, misuse of tobacco, alcohol and drugs, dental health, mental health and immunizations    Patient's Body mass index is 41.76 kg/m². indicating that he is morbidly obese (BMI > 40 or > 35 with obesity - related health condition). Obesity-related health conditions include  the following: hypertension, diabetes mellitus and dyslipidemias. Obesity is improving with lifestyle modifications. BMI is is above average; BMI management plan is completed. We discussed portion control and increasing exercise..      Return in about 6 months (around 10/1/2022) for Recheck with A1c.    Patient was given instructions and counseling regarding his/her condition or for health maintenance advice. Please see specific information pulled into the AVS if appropriate.     CHARMAINE Sanchez MD  15:29 CDT  4/1/2022

## 2022-04-08 ENCOUNTER — LAB (OUTPATIENT)
Dept: LAB | Facility: HOSPITAL | Age: 37
End: 2022-04-08

## 2022-04-08 DIAGNOSIS — Z00.00 ENCOUNTER FOR PREVENTIVE CARE: ICD-10-CM

## 2022-04-08 DIAGNOSIS — E11.9 TYPE 2 DIABETES MELLITUS WITHOUT COMPLICATION, WITHOUT LONG-TERM CURRENT USE OF INSULIN: ICD-10-CM

## 2022-04-08 DIAGNOSIS — Z11.59 NEED FOR HEPATITIS C SCREENING TEST: ICD-10-CM

## 2022-04-08 LAB
ALBUMIN SERPL-MCNC: 4.5 G/DL (ref 3.5–5.2)
ALBUMIN UR-MCNC: 2.1 MG/DL
ALBUMIN/GLOB SERPL: 1.7 G/DL
ALP SERPL-CCNC: 101 U/L (ref 39–117)
ALT SERPL W P-5'-P-CCNC: 42 U/L (ref 1–41)
ANION GAP SERPL CALCULATED.3IONS-SCNC: 14.2 MMOL/L (ref 5–15)
AST SERPL-CCNC: 25 U/L (ref 1–40)
BILIRUB SERPL-MCNC: 0.3 MG/DL (ref 0–1.2)
BUN SERPL-MCNC: 16 MG/DL (ref 6–20)
BUN/CREAT SERPL: 22.2 (ref 7–25)
CALCIUM SPEC-SCNC: 9.7 MG/DL (ref 8.6–10.5)
CHLORIDE SERPL-SCNC: 102 MMOL/L (ref 98–107)
CHOLEST SERPL-MCNC: 175 MG/DL (ref 0–200)
CO2 SERPL-SCNC: 25.8 MMOL/L (ref 22–29)
CREAT SERPL-MCNC: 0.72 MG/DL (ref 0.76–1.27)
CREAT UR-MCNC: 160.7 MG/DL
DEPRECATED RDW RBC AUTO: 38.8 FL (ref 37–54)
EGFRCR SERPLBLD CKD-EPI 2021: 119.9 ML/MIN/1.73
ERYTHROCYTE [DISTWIDTH] IN BLOOD BY AUTOMATED COUNT: 12.5 % (ref 12.3–15.4)
GLOBULIN UR ELPH-MCNC: 2.7 GM/DL
GLUCOSE SERPL-MCNC: 166 MG/DL (ref 65–99)
HBA1C MFR BLD: 6.5 % (ref 4.8–5.6)
HCT VFR BLD AUTO: 44.1 % (ref 37.5–51)
HCV AB SER DONR QL: NORMAL
HDLC SERPL-MCNC: 38 MG/DL (ref 40–60)
HGB BLD-MCNC: 14.6 G/DL (ref 13–17.7)
LDLC SERPL CALC-MCNC: 79 MG/DL (ref 0–100)
LDLC/HDLC SERPL: 1.69 {RATIO}
MCH RBC QN AUTO: 28.2 PG (ref 26.6–33)
MCHC RBC AUTO-ENTMCNC: 33.1 G/DL (ref 31.5–35.7)
MCV RBC AUTO: 85.1 FL (ref 79–97)
MICROALBUMIN/CREAT UR: 13.1 MG/G
PLATELET # BLD AUTO: 335 10*3/MM3 (ref 140–450)
PMV BLD AUTO: 11.8 FL (ref 6–12)
POTASSIUM SERPL-SCNC: 4.2 MMOL/L (ref 3.5–5.2)
PROT SERPL-MCNC: 7.2 G/DL (ref 6–8.5)
RBC # BLD AUTO: 5.18 10*6/MM3 (ref 4.14–5.8)
SODIUM SERPL-SCNC: 142 MMOL/L (ref 136–145)
TRIGL SERPL-MCNC: 363 MG/DL (ref 0–150)
VLDLC SERPL-MCNC: 58 MG/DL (ref 5–40)
WBC NRBC COR # BLD: 7.24 10*3/MM3 (ref 3.4–10.8)

## 2022-04-08 PROCEDURE — 82043 UR ALBUMIN QUANTITATIVE: CPT

## 2022-04-08 PROCEDURE — 85027 COMPLETE CBC AUTOMATED: CPT

## 2022-04-08 PROCEDURE — 82570 ASSAY OF URINE CREATININE: CPT

## 2022-04-08 PROCEDURE — 86803 HEPATITIS C AB TEST: CPT

## 2022-04-08 PROCEDURE — 83036 HEMOGLOBIN GLYCOSYLATED A1C: CPT

## 2022-04-08 PROCEDURE — 80053 COMPREHEN METABOLIC PANEL: CPT

## 2022-04-08 PROCEDURE — 36415 COLL VENOUS BLD VENIPUNCTURE: CPT

## 2022-04-08 PROCEDURE — 80061 LIPID PANEL: CPT

## 2022-04-26 ENCOUNTER — OFFICE VISIT (OUTPATIENT)
Dept: NEUROLOGY | Facility: CLINIC | Age: 37
End: 2022-04-26

## 2022-04-26 VITALS — SYSTOLIC BLOOD PRESSURE: 126 MMHG | HEART RATE: 103 BPM | DIASTOLIC BLOOD PRESSURE: 80 MMHG | OXYGEN SATURATION: 97 %

## 2022-04-26 DIAGNOSIS — Z99.89 OBSTRUCTIVE SLEEP APNEA ON CPAP: Primary | ICD-10-CM

## 2022-04-26 DIAGNOSIS — G47.33 OBSTRUCTIVE SLEEP APNEA ON CPAP: Primary | ICD-10-CM

## 2022-04-26 PROCEDURE — 99213 OFFICE O/P EST LOW 20 MIN: CPT | Performed by: NURSE PRACTITIONER

## 2022-04-26 NOTE — PROGRESS NOTES
Neurology Progress Note      Chief Complaint:    Obstructive sleep apnea    Subjective     Subjective:  Gagandeep Aquino is a 35 year old male who presents to the office today for an annual YVES visit.  He is routinely followed by Dr. Johann Sanchez MD for primary care.  He was last seen by me on 4/26/2021.  He presents today doing well.  He has no new complaints or questions.  He denies any new symptoms of obstructive sleep apnea.  He denies any multiple awakenings at night, snoring over therapy, awakening with gasping for air, nonrestorative sleep, or daytime hypersomnolence.  He does have underlying comorbid disease which she does state at times will cause him to be extra tired during the day.  However, he states that he does not attribute any of this to his sleep and he really feels that the use of his CPAP has drastically helped his overall quality of sleep.  He has no other complaints or concerns today.    Past Medical History:   Diagnosis Date   • Acid reflux    • Back pain    • Hypertension    • Sleep apnea     wears cpap   • Type 2 diabetes mellitus (HCC)      Past Surgical History:   Procedure Laterality Date   • CHOLECYSTECTOMY LAPAROSCOPIC INTRAOPERATIVE CHOLANGIOGRAM WITH LIVER BIOPSY N/A 8/10/2021    Procedure: LAPAROSCOPIC CHOLECYSTECTOMY, LAPAROSCOPIC LIVER BIOPSY,;  Surgeon: Jaclyn Handy MD;  Location: [facility];  Service: General;  Laterality: N/A;   • HAND SURGERY     • HERNIA REPAIR     • VASECTOMY  04/15/2021     Family History   Problem Relation Age of Onset   • Diabetes Mother    • Stroke Father         X4   • Heart attack Maternal Grandmother    • Lung cancer Maternal Grandfather      Social History     Tobacco Use   • Smoking status: Never Smoker   • Smokeless tobacco: Former User   Vaping Use   • Vaping Use: Never used   Substance Use Topics   • Alcohol use: Yes     Comment: Rarely   • Drug use: No     Medications:  Current Outpatient Medications   Medication Sig Dispense Refill   •  acetaminophen (Tylenol) 325 MG tablet Take 3 tablets by mouth Every 8 (Eight) Hours. Take every 8 hours for 3 days then take prn as needed. 100 tablet 2   • amLODIPine (NORVASC) 10 MG tablet Take 1 tablet by mouth Daily. 90 tablet 3   • diphenoxylate-atropine (LOMOTIL) 2.5-0.025 MG per tablet Take 1 tablet by mouth As Needed.     • FLUoxetine (PROzac) 20 MG capsule Take 1 capsule by mouth Daily. 90 capsule 3   • hydroCHLOROthiazide (HYDRODIURIL) 50 MG tablet Take 1 tablet by mouth Daily. 90 tablet 3   • ibuprofen (Motrin IB) 200 MG tablet Take 3 tablets by mouth Every 8 (Eight) Hours. Take every 8 hours for three days then take as needed. 100 tablet 2   • losartan (COZAAR) 50 MG tablet TAKE 2 TABLETS BY MOUTH ONCE DAILY 60 tablet 5   • metFORMIN (GLUCOPHAGE) 1000 MG tablet Take 1 tablet by mouth 2 (Two) Times a Day With Meals. 180 tablet 3   • omeprazole (priLOSEC) 40 MG capsule Take 1 capsule by mouth Daily. 90 capsule 3   • ondansetron (Zofran) 4 MG tablet Take 1 tablet by mouth Every 8 (Eight) Hours As Needed for Nausea. 15 tablet 0   • Semaglutide (Rybelsus) 7 MG tablet Take 7 mg by mouth Daily. 30 tablet 5   • True Metrix Blood Glucose Test test strip TEST BLOOD SUGAR FOUR TIMES DAILY 100 each 11     No current facility-administered medications for this visit.     Current outpatient and discharge medications have been reconciled for the patient.  Reviewed by: KARUNA Muniz      Allergies:    Patient has no known allergies.    Review of Systems:   Review of Systems   Psychiatric/Behavioral: Positive for sleep disturbance.   All other systems reviewed and are negative.        Objective      Vital Signs  Heart Rate:  [103] 103  BP: (126)/(80) 126/80    Physical Exam:  Physical Exam  Constitutional:       Appearance: Normal appearance. He is obese.   HENT:      Head: Normocephalic.   Eyes:      Extraocular Movements: Extraocular movements intact.      Pupils: Pupils are equal, round, and reactive to light.    Cardiovascular:      Rate and Rhythm: Normal rate and regular rhythm.      Pulses: Normal pulses.   Pulmonary:      Effort: Pulmonary effort is normal.   Musculoskeletal:         General: Normal range of motion.      Cervical back: Normal range of motion and neck supple.   Skin:     General: Skin is warm and dry.      Capillary Refill: Capillary refill takes less than 2 seconds.   Neurological:      General: No focal deficit present.      Mental Status: He is alert and oriented to person, place, and time. Mental status is at baseline.      Cranial Nerves: Cranial nerves are intact.      Sensory: Sensation is intact.      Motor: Motor function is intact.      Coordination: Coordination is intact.      Gait: Gait is intact.      Deep Tendon Reflexes: Reflexes are normal and symmetric.   Psychiatric:         Mood and Affect: Mood normal.         Neck Circumference: 19.5 inches  Mallampati Classification: 4       Results Review:    Compliance Report:  This compliance report is for the dates of 3/26/2022-4/24/2022.  Patient use advised for 29/30 days for 97% compliance.  Of those dates patient use the device for greater than 4 hours for 28 days for 93% compliance.  Patient is currently set on APAP with a minimum pressure of 8 cm H2O and a maximum pressure of 16 cm H2O.  Current AHI is 2.4.    Assessment/Plan     Impression:  1. Obstructive sleep apnea  2. Appropriate compliance with CPAP    Plan:  · Continue CPAP therapy.  I went over the importance of compliance with therapy.  He states complete understanding.    · I have placed orders for CPAP supplies for the patient.    · I have recommended regular cardiovascular exercise in the form of walking, biking or swimming 30-40 minutes at a time at least 3-4 times per week.    · Counseled on multimodal approach to treatment of sleep apnea to include but not limited to diet, exercise, sleep hygiene, compliance with pap therapy.     · Encouraged lateral sleeping position and  to avoid sedatives or alcohol close to bedtime.     · Risks of untreated sleep apnea were discussed to include but not limited to HTN, heart disease, stroke, cardiac arrhythmia such as AFIB, and dementia.    · I have reviewed the current compliance download with the patient in detail.  He  understands that a certain level of compliance allows for continued insurance coverage as well as adequate treatment of underlying apnea.  He also understands the impact this has upon their overall health status and other medical comorbidities.      The plan of care was fully discussed with the patient and he is in full agreement at this time.     Follow-up:  · Return in about 1 year (around 4/26/2023) for Obstructive sleep apnea follow-up, Annual compliance review.       Toni Abdi, KARUNA  04/26/22  16:23 CDT

## 2022-09-07 ENCOUNTER — TELEPHONE (OUTPATIENT)
Dept: INTERNAL MEDICINE | Facility: CLINIC | Age: 37
End: 2022-09-07

## 2022-09-07 NOTE — TELEPHONE ENCOUNTER
Caller: Gagandeep Ordoñez    Relationship: Self    Best call back number: [phone]     What medications are you currently taking:   Current Outpatient Medications on File Prior to Visit   Medication Sig Dispense Refill   • amLODIPine (NORVASC) 10 MG tablet Take 1 tablet by mouth Daily. 90 tablet 3   • diphenoxylate-atropine (LOMOTIL) 2.5-0.025 MG per tablet Take 1 tablet by mouth As Needed.     • FLUoxetine (PROzac) 20 MG capsule Take 1 capsule by mouth Daily. 90 capsule 3   • hydroCHLOROthiazide (HYDRODIURIL) 50 MG tablet Take 1 tablet by mouth Daily. 90 tablet 3   • losartan (COZAAR) 50 MG tablet TAKE 2 TABLETS BY MOUTH ONCE DAILY 60 tablet 5   • metFORMIN (GLUCOPHAGE) 1000 MG tablet Take 1 tablet by mouth 2 (Two) Times a Day With Meals. 180 tablet 3   • omeprazole (priLOSEC) 40 MG capsule Take 1 capsule by mouth Daily. 90 capsule 3   • Semaglutide (Rybelsus) 7 MG tablet Take 7 mg by mouth Daily. 30 tablet 5   • True Metrix Blood Glucose Test test strip TEST BLOOD SUGAR FOUR TIMES DAILY 100 each 11     No current facility-administered medications on file prior to visit.        Which medication are you concerned about: FLUoxetine (PROzac) 20 MG capsule    What are your concerns: PATIENT REQUESTING AN INCREASE OF DOSAGE FROM 20MG TO 40MG. PATIENT ALSO STATES HE HAS BEEN TAKING 2 CAPSULES A DAY.

## 2022-09-12 ENCOUNTER — OFFICE VISIT (OUTPATIENT)
Dept: INTERNAL MEDICINE | Facility: CLINIC | Age: 37
End: 2022-09-12

## 2022-09-12 VITALS
OXYGEN SATURATION: 98 % | HEART RATE: 86 BPM | WEIGHT: 315 LBS | SYSTOLIC BLOOD PRESSURE: 126 MMHG | HEIGHT: 72 IN | DIASTOLIC BLOOD PRESSURE: 82 MMHG | BODY MASS INDEX: 42.66 KG/M2

## 2022-09-12 DIAGNOSIS — Z56.6 STRESS AT WORK: ICD-10-CM

## 2022-09-12 DIAGNOSIS — R45.1 AGITATION: ICD-10-CM

## 2022-09-12 DIAGNOSIS — F41.9 ANXIETY: Primary | ICD-10-CM

## 2022-09-12 PROCEDURE — 99213 OFFICE O/P EST LOW 20 MIN: CPT | Performed by: NURSE PRACTITIONER

## 2022-09-12 RX ORDER — FLUOXETINE HYDROCHLORIDE 40 MG/1
40 CAPSULE ORAL DAILY
Qty: 90 CAPSULE | Refills: 1 | Status: SHIPPED | OUTPATIENT
Start: 2022-09-12 | End: 2023-03-15

## 2022-09-12 SDOH — HEALTH STABILITY - MENTAL HEALTH: OTHER PHYSICAL AND MENTAL STRAIN RELATED TO WORK: Z56.6

## 2022-09-12 NOTE — PROGRESS NOTES
Chief Complaint   Patient presents with   • Anxiety   • Depression         History:  Gagandeep Ordoñez is a 36 y.o. male who presents today for evaluation of the above problems.          Her is here for medication adjustment.  He has been on Prozac 20 mg daily for stress related to work for several months.  Recently, he has felt more frustrated with co-workers and more stressed.  He increased his Prozac to 40 mg daily for a week and feels much better. He will need a refill and wanted to talk about increasing this dose permanently.    He denies depressive symptoms or suicidal ideation.  He scores 6 today on the JAYY-7 anxiety measure; he does not feel overly anxious.  He has not had anything that he would describe as a panic attack.      ROS:  Review of Systems  As above    No Known Allergies  Past Medical History:   Diagnosis Date   • Acid reflux    • Back pain    • Hypertension    • Sleep apnea     wears cpap   • Type 2 diabetes mellitus (HCC)      Past Surgical History:   Procedure Laterality Date   • CHOLECYSTECTOMY LAPAROSCOPIC INTRAOPERATIVE CHOLANGIOGRAM WITH LIVER BIOPSY N/A 8/10/2021    Procedure: LAPAROSCOPIC CHOLECYSTECTOMY, LAPAROSCOPIC LIVER BIOPSY,;  Surgeon: Jaclyn Handy MD;  Location: [facility];  Service: General;  Laterality: N/A;   • HAND SURGERY     • HERNIA REPAIR     • VASECTOMY  04/15/2021     Family History   Problem Relation Age of Onset   • Diabetes Mother    • Stroke Father         X4   • Heart attack Maternal Grandmother    • Lung cancer Maternal Grandfather      Gagandeep  reports that he has never smoked. He has quit using smokeless tobacco. He reports current alcohol use. He reports that he does not use drugs.    I have reviewed and updated the above documentation (if necessary) including but not limited to chief complaint, ROS, PFSH, allergies and medications        Current Outpatient Medications:   •  amLODIPine (NORVASC) 10 MG tablet, Take 1 tablet by mouth Daily., Disp: 90 tablet, Rfl:  "3  •  hydroCHLOROthiazide (HYDRODIURIL) 50 MG tablet, Take 1 tablet by mouth Daily., Disp: 90 tablet, Rfl: 3  •  metFORMIN (GLUCOPHAGE) 1000 MG tablet, Take 1 tablet by mouth 2 (Two) Times a Day With Meals., Disp: 180 tablet, Rfl: 3  •  omeprazole (priLOSEC) 40 MG capsule, Take 1 capsule by mouth Daily., Disp: 90 capsule, Rfl: 3  •  Semaglutide (Rybelsus) 7 MG tablet, Take 7 mg by mouth Daily., Disp: 30 tablet, Rfl: 5  •  True Metrix Blood Glucose Test test strip, TEST BLOOD SUGAR FOUR TIMES DAILY, Disp: 100 each, Rfl: 11  •  diphenoxylate-atropine (LOMOTIL) 2.5-0.025 MG per tablet, Take 1 tablet by mouth As Needed., Disp: , Rfl:   •  FLUoxetine (PROzac) 40 MG capsule, Take 1 capsule by mouth Daily., Disp: 90 capsule, Rfl: 1  •  losartan (COZAAR) 50 MG tablet, TAKE 2 TABLETS BY MOUTH ONCE DAILY, Disp: 60 tablet, Rfl: 5    OBJECTIVE:  Visit Vitals  /82 (BP Location: Right arm, Patient Position: Sitting, Cuff Size: Adult)   Pulse 86   Ht 182.9 cm (72\")   Wt (!) 143 kg (316 lb)   SpO2 98%   BMI 42.86 kg/m²       PHQ-9 Depression Screening  Little interest or pleasure in doing things? 0-->not at all   Feeling down, depressed, or hopeless? 0-->not at all   Trouble falling or staying asleep, or sleeping too much?     Feeling tired or having little energy?     Poor appetite or overeating?     Feeling bad about yourself - or that you are a failure or have let yourself or your family down?     Trouble concentrating on things, such as reading the newspaper or watching television?     Moving or speaking so slowly that other people could have noticed? Or the opposite - being so fidgety or restless that you have been moving around a lot more than usual?     Thoughts that you would be better off dead, or of hurting yourself in some way?     PHQ-9 Total Score 0   If you checked off any problems, how difficult have these problems made it for you to do your work, take care of things at home, or get along with other people?   "     JAYY-7 score 6 today.     Physical Exam  Vitals and nursing note reviewed.   Constitutional:       General: He is not in acute distress.     Appearance: Normal appearance. He is not ill-appearing, toxic-appearing or diaphoretic.   HENT:      Head: Normocephalic and atraumatic.   Eyes:      General: No scleral icterus.  Cardiovascular:      Rate and Rhythm: Normal rate and regular rhythm.   Pulmonary:      Effort: Pulmonary effort is normal. No respiratory distress.      Breath sounds: Normal breath sounds. No wheezing.   Abdominal:      Palpations: Abdomen is soft.   Musculoskeletal:      Right lower leg: No edema.      Left lower leg: No edema.   Skin:     General: Skin is warm and dry.   Neurological:      Mental Status: He is alert and oriented to person, place, and time.   Psychiatric:         Mood and Affect: Mood normal.         Behavior: Behavior normal.         Thought Content: Thought content normal.         Judgment: Judgment normal.         [facility]    Assessment/Plan    Diagnoses and all orders for this visit:    1. Anxiety (Primary)    2. Stress at work  -     FLUoxetine (PROzac) 40 MG capsule; Take 1 capsule by mouth Daily.  Dispense: 90 capsule; Refill: 1    3. Agitation    Anxiety and agitation related to stress at work, on Prozac for management.   He has already increased the Prozac to 40 mg and feels much better on this dose. I will change the tablet to 40 mg tablet and refill.  Offered to refer to Behavioral Health for counseling regarding stress management, but he defers at this time.        Education materials and an After Visit Summary were printed and given to the patient at discharge.  Return for Next scheduled follow up.         Abigail Story, KARUNA   15:34 CDT  9/20/2022

## 2022-09-15 RX ORDER — LOSARTAN POTASSIUM 50 MG/1
TABLET ORAL
Qty: 60 TABLET | Refills: 5 | Status: SHIPPED | OUTPATIENT
Start: 2022-09-15 | End: 2023-03-15

## 2022-10-10 ENCOUNTER — OFFICE VISIT (OUTPATIENT)
Dept: INTERNAL MEDICINE | Facility: CLINIC | Age: 37
End: 2022-10-10

## 2022-10-10 VITALS
OXYGEN SATURATION: 98 % | RESPIRATION RATE: 15 BRPM | HEIGHT: 72 IN | DIASTOLIC BLOOD PRESSURE: 70 MMHG | HEART RATE: 99 BPM | SYSTOLIC BLOOD PRESSURE: 115 MMHG | TEMPERATURE: 97.5 F | BODY MASS INDEX: 42.66 KG/M2 | WEIGHT: 315 LBS

## 2022-10-10 DIAGNOSIS — I10 ESSENTIAL HYPERTENSION: ICD-10-CM

## 2022-10-10 DIAGNOSIS — E11.65 TYPE 2 DIABETES MELLITUS WITH HYPERGLYCEMIA, WITHOUT LONG-TERM CURRENT USE OF INSULIN: Primary | ICD-10-CM

## 2022-10-10 DIAGNOSIS — Z00.00 HEALTHCARE MAINTENANCE: ICD-10-CM

## 2022-10-10 LAB
EXPIRATION DATE: ABNORMAL
HBA1C MFR BLD: 7.1 %
Lab: ABNORMAL

## 2022-10-10 PROCEDURE — 83036 HEMOGLOBIN GLYCOSYLATED A1C: CPT | Performed by: INTERNAL MEDICINE

## 2022-10-10 PROCEDURE — 99214 OFFICE O/P EST MOD 30 MIN: CPT | Performed by: INTERNAL MEDICINE

## 2022-10-10 RX ORDER — ORAL SEMAGLUTIDE 7 MG/1
1 TABLET ORAL DAILY
Qty: 30 TABLET | Refills: 5 | Status: SHIPPED | OUTPATIENT
Start: 2022-10-10

## 2022-10-10 NOTE — PROGRESS NOTES
Chief Complaint   Patient presents with   • Diabetes   • Follow-up         History:  Gagandeep Ordoñez is a 36 y.o. male who presents today for evaluation of the above problems.      6 month follow up on diabetes and stress    Reviewed Kristina Story's office note from September 12 in which his Prozac was increased to 40 mg.  This dose is working out very well for him.    A1c is 7.1% today and was 6.5% on April 8, 2022.  He feels like this is related to his diet recently been worsening due to death in the family and just not eating as well.    Declines influenza vaccine today    He declines COVID-19 booster.    No chest pain shortness of breath or palpitations    HPI      ROS:  Review of Systems  See above    Current Outpatient Medications:   •  amLODIPine (NORVASC) 10 MG tablet, Take 1 tablet by mouth Daily., Disp: 90 tablet, Rfl: 3  •  diphenoxylate-atropine (LOMOTIL) 2.5-0.025 MG per tablet, Take 1 tablet by mouth As Needed., Disp: , Rfl:   •  FLUoxetine (PROzac) 40 MG capsule, Take 1 capsule by mouth Daily., Disp: 90 capsule, Rfl: 1  •  hydroCHLOROthiazide (HYDRODIURIL) 50 MG tablet, Take 1 tablet by mouth Daily., Disp: 90 tablet, Rfl: 3  •  losartan (COZAAR) 50 MG tablet, TAKE 2 TABLETS BY MOUTH ONCE DAILY, Disp: 60 tablet, Rfl: 5  •  metFORMIN (GLUCOPHAGE) 1000 MG tablet, Take 1 tablet by mouth 2 (Two) Times a Day With Meals., Disp: 180 tablet, Rfl: 3  •  omeprazole (priLOSEC) 40 MG capsule, Take 1 capsule by mouth Daily., Disp: 90 capsule, Rfl: 3  •  Semaglutide (Rybelsus) 7 MG tablet, Take 7 mg by mouth Daily., Disp: 30 tablet, Rfl: 5  •  True Metrix Blood Glucose Test test strip, TEST BLOOD SUGAR FOUR TIMES DAILY, Disp: 100 each, Rfl: 11    Lab Results   Component Value Date    GLUCOSE 166 (H) 04/08/2022    BUN 16 04/08/2022    CREATININE 0.72 (L) 04/08/2022    EGFRIFNONA 101 08/31/2021    BCR 22.2 04/08/2022    K 4.2 04/08/2022    CO2 25.8 04/08/2022    CALCIUM 9.7 04/08/2022    ALBUMIN 4.50 04/08/2022    AST 25  04/08/2022    ALT 42 (H) 04/08/2022       WBC   Date Value Ref Range Status   04/08/2022 7.24 3.40 - 10.80 10*3/mm3 Final   02/20/2019 10.3 4.8 - 10.8 K/uL Final     RBC   Date Value Ref Range Status   04/08/2022 5.18 4.14 - 5.80 10*6/mm3 Final   02/20/2019 5.25 4.70 - 6.10 M/uL Final     Hemoglobin   Date Value Ref Range Status   04/08/2022 14.6 13.0 - 17.7 g/dL Final   02/20/2019 15.1 14.0 - 18.0 g/dL Final     Hematocrit   Date Value Ref Range Status   04/08/2022 44.1 37.5 - 51.0 % Final   02/20/2019 45.6 42.0 - 52.0 % Final     MCV   Date Value Ref Range Status   04/08/2022 85.1 79.0 - 97.0 fL Final   02/20/2019 86.9 80.0 - 94.0 fL Final     MCH   Date Value Ref Range Status   04/08/2022 28.2 26.6 - 33.0 pg Final   02/20/2019 28.8 27.0 - 31.0 pg Final     MCHC   Date Value Ref Range Status   04/08/2022 33.1 31.5 - 35.7 g/dL Final   02/20/2019 33.1 33.0 - 37.0 g/dL Final     RDW   Date Value Ref Range Status   04/08/2022 12.5 12.3 - 15.4 % Final   02/20/2019 12.9 11.5 - 14.5 % Final     RDW-SD   Date Value Ref Range Status   04/08/2022 38.8 37.0 - 54.0 fl Final     MPV   Date Value Ref Range Status   04/08/2022 11.8 6.0 - 12.0 fL Final   02/20/2019 11.2 9.4 - 12.4 fL Final     Platelets   Date Value Ref Range Status   04/08/2022 335 140 - 450 10*3/mm3 Final   02/20/2019 292 130 - 400 K/uL Final     Neutrophil Rel %   Date Value Ref Range Status   02/20/2019 56.4 50.0 - 65.0 % Final     Neutrophil %   Date Value Ref Range Status   08/31/2021 75.6 42.7 - 76.0 % Final     Lymphocyte Rel %   Date Value Ref Range Status   02/20/2019 33.2 20.0 - 40.0 % Final     Lymphocyte %   Date Value Ref Range Status   08/31/2021 16.1 (L) 19.6 - 45.3 % Final     Monocyte Rel %   Date Value Ref Range Status   02/20/2019 7.9 0.0 - 10.0 % Final     Monocyte %   Date Value Ref Range Status   08/31/2021 5.3 5.0 - 12.0 % Final     Eosinophil Rel %   Date Value Ref Range Status   02/20/2019 1.4 0.0 - 5.0 % Final     Eosinophil %   Date  "Value Ref Range Status   08/31/2021 0.9 0.3 - 6.2 % Final     Basophil Rel %   Date Value Ref Range Status   02/20/2019 0.7 0.0 - 1.0 % Final     Basophil %   Date Value Ref Range Status   08/31/2021 0.5 0.0 - 1.5 % Final     Immature Grans %   Date Value Ref Range Status   08/31/2021 1.6 (H) 0.0 - 0.5 % Final     Neutrophils Absolute   Date Value Ref Range Status   02/20/2019 5.8 1.5 - 7.5 K/uL Final     Neutrophils, Absolute   Date Value Ref Range Status   08/31/2021 10.23 (H) 1.70 - 7.00 10*3/mm3 Final     Lymphocytes Absolute   Date Value Ref Range Status   02/20/2019 3.4 1.1 - 4.5 K/uL Final     Lymphocytes, Absolute   Date Value Ref Range Status   08/31/2021 2.18 0.70 - 3.10 10*3/mm3 Final     Monocytes Absolute   Date Value Ref Range Status   02/20/2019 0.80 0.00 - 0.90 K/uL Final     Monocytes, Absolute   Date Value Ref Range Status   08/31/2021 0.71 0.10 - 0.90 10*3/mm3 Final     Eosinophils Absolute   Date Value Ref Range Status   02/20/2019 0.10 0.00 - 0.60 K/uL Final     Eosinophils, Absolute   Date Value Ref Range Status   08/31/2021 0.12 0.00 - 0.40 10*3/mm3 Final     Basophils Absolute   Date Value Ref Range Status   02/20/2019 0.10 0.00 - 0.20 K/uL Final     Basophils, Absolute   Date Value Ref Range Status   08/31/2021 0.07 0.00 - 0.20 10*3/mm3 Final     Immature Grans, Absolute   Date Value Ref Range Status   08/31/2021 0.21 (H) 0.00 - 0.05 10*3/mm3 Final     nRBC   Date Value Ref Range Status   08/31/2021 0.0 0.0 - 0.2 /100 WBC Final         OBJECTIVE:  Visit Vitals  /70 (BP Location: Left arm, Patient Position: Sitting, Cuff Size: Adult)   Pulse 99   Temp 97.5 °F (36.4 °C) (Oral)   Resp 15   Ht 182.9 cm (72.01\")   Wt (!) 143 kg (315 lb)   SpO2 98%   BMI 42.71 kg/m²      Physical Exam  Constitutional:       General: He is not in acute distress.     Appearance: He is well-developed. He is not ill-appearing or toxic-appearing.   HENT:      Head: Normocephalic and atraumatic.   Eyes:      " Pupils: Pupils are equal, round, and reactive to light.   Neck:      Thyroid: No thyromegaly.      Trachea: Phonation normal.   Pulmonary:      Effort: No respiratory distress.   Skin:     Coloration: Skin is not pale.      Findings: No erythema.   Neurological:      Mental Status: He is alert.   Psychiatric:         Behavior: Behavior normal.         Thought Content: Thought content normal.         Judgment: Judgment normal.         Assessment/Plan    Diagnoses and all orders for this visit:    1. Type 2 diabetes mellitus with hyperglycemia, without long-term current use of insulin (HCC) (Primary)  -     POC Glycosylated Hemoglobin (Hb A1C)  -     Semaglutide (Rybelsus) 7 MG tablet; Take 7 mg by mouth Daily.  Dispense: 30 tablet; Refill: 5  -     Lipid Panel; Future  -     Microalbumin / Creatinine Urine Ratio - Urine, Clean Catch; Future    2. Essential hypertension  -     Comprehensive Metabolic Panel; Future    3. Healthcare maintenance  -     Comprehensive Metabolic Panel; Future  -     Hemoglobin A1c; Future  -     Lipid Panel; Future      His diabetes has worsened to 7.1%.  Would recommend focusing on diet and exercise to help improve this number.  Refill Rybelsus today.  We we will recheck A1c in 6 months and if sugars still uncontrolled we can increase his Rybelsus to 14 mg daily.    Blood pressure is well controlled we will continue his current regimen    He does not need any labs today, but I have ordered the above blood work to be done a few days before his next visit in 6 months which will be his annual physical.  Follow-up sooner if needed.    Return in about 6 months (around 4/10/2023) for Annual physical.      CHARMAINE Sanchez MD  15:51 CDT  10/10/2022

## 2023-01-12 DIAGNOSIS — I10 ESSENTIAL HYPERTENSION: ICD-10-CM

## 2023-01-12 DIAGNOSIS — E11.9 TYPE 2 DIABETES MELLITUS WITHOUT COMPLICATION, WITHOUT LONG-TERM CURRENT USE OF INSULIN: ICD-10-CM

## 2023-01-12 DIAGNOSIS — K21.9 GASTROESOPHAGEAL REFLUX DISEASE, UNSPECIFIED WHETHER ESOPHAGITIS PRESENT: ICD-10-CM

## 2023-01-12 RX ORDER — AMLODIPINE BESYLATE 10 MG/1
TABLET ORAL
Qty: 30 TABLET | Refills: 5 | Status: SHIPPED | OUTPATIENT
Start: 2023-01-12

## 2023-01-12 RX ORDER — OMEPRAZOLE 40 MG/1
CAPSULE, DELAYED RELEASE ORAL
Qty: 30 CAPSULE | Refills: 11 | Status: SHIPPED | OUTPATIENT
Start: 2023-01-12

## 2023-01-12 RX ORDER — HYDROCHLOROTHIAZIDE 50 MG/1
TABLET ORAL
Qty: 30 TABLET | Refills: 5 | Status: SHIPPED | OUTPATIENT
Start: 2023-01-12

## 2023-03-15 DIAGNOSIS — Z56.6 STRESS AT WORK: ICD-10-CM

## 2023-03-15 RX ORDER — LOSARTAN POTASSIUM 50 MG/1
100 TABLET ORAL DAILY
Qty: 60 TABLET | Refills: 5 | Status: SHIPPED | OUTPATIENT
Start: 2023-03-15

## 2023-03-15 RX ORDER — FLUOXETINE HYDROCHLORIDE 40 MG/1
40 CAPSULE ORAL DAILY
Qty: 30 CAPSULE | Refills: 5 | Status: SHIPPED | OUTPATIENT
Start: 2023-03-15

## 2023-03-15 SDOH — HEALTH STABILITY - MENTAL HEALTH: OTHER PHYSICAL AND MENTAL STRAIN RELATED TO WORK: Z56.6

## 2023-04-03 ENCOUNTER — TELEPHONE (OUTPATIENT)
Dept: INTERNAL MEDICINE | Facility: CLINIC | Age: 38
End: 2023-04-03

## 2023-04-03 NOTE — TELEPHONE ENCOUNTER
"  Caller: Gagandeep Ordoñez \"Vincent\"    Relationship: Self    Best call back number: [phone] (home)     What was the call regarding: PATIENTS PHYSICAL APPOINTMENT HAS BEEN RESCHEDULED, IS GOING TO BE SEEING KARUNA KLINE.    Do you require a callback: NO  "

## 2023-04-10 ENCOUNTER — LAB (OUTPATIENT)
Dept: LAB | Facility: HOSPITAL | Age: 38
End: 2023-04-10
Payer: COMMERCIAL

## 2023-04-10 DIAGNOSIS — E11.65 TYPE 2 DIABETES MELLITUS WITH HYPERGLYCEMIA, WITHOUT LONG-TERM CURRENT USE OF INSULIN: ICD-10-CM

## 2023-04-10 DIAGNOSIS — I10 ESSENTIAL HYPERTENSION: ICD-10-CM

## 2023-04-10 DIAGNOSIS — Z00.00 HEALTHCARE MAINTENANCE: ICD-10-CM

## 2023-04-10 LAB
ALBUMIN SERPL-MCNC: 4.3 G/DL (ref 3.5–5)
ALBUMIN UR-MCNC: 2.3 MG/DL
ALBUMIN/GLOB SERPL: 1.3 G/DL (ref 1.1–2.5)
ALP SERPL-CCNC: 101 U/L (ref 24–120)
ALT SERPL W P-5'-P-CCNC: 66 U/L (ref 0–50)
ANION GAP SERPL CALCULATED.3IONS-SCNC: 5 MMOL/L (ref 4–13)
AST SERPL-CCNC: 43 U/L (ref 7–45)
BILIRUB SERPL-MCNC: 0.4 MG/DL (ref 0.1–1)
BUN SERPL-MCNC: 17 MG/DL (ref 5–21)
BUN/CREAT SERPL: 20.5
CALCIUM SPEC-SCNC: 9.2 MG/DL (ref 8.4–10.4)
CHLORIDE SERPL-SCNC: 101 MMOL/L (ref 98–110)
CHOLEST SERPL-MCNC: 177 MG/DL (ref 130–200)
CO2 SERPL-SCNC: 31 MMOL/L (ref 24–31)
CREAT SERPL-MCNC: 0.83 MG/DL (ref 0.5–1.4)
CREAT UR-MCNC: 147.4 MG/DL
EGFRCR SERPLBLD CKD-EPI 2021: 114.2 ML/MIN/1.73
GLOBULIN UR ELPH-MCNC: 3.4 GM/DL
GLUCOSE SERPL-MCNC: 176 MG/DL (ref 70–100)
HBA1C MFR BLD: 7.7 % (ref 4.8–5.9)
HDLC SERPL-MCNC: 43 MG/DL
LDLC SERPL CALC-MCNC: 74 MG/DL (ref 0–99)
LDLC/HDLC SERPL: 1.35 {RATIO}
MICROALBUMIN/CREAT UR: 15.6 MG/G
POTASSIUM SERPL-SCNC: 4.3 MMOL/L (ref 3.5–5.3)
PROT SERPL-MCNC: 7.7 G/DL (ref 6.3–8.7)
SODIUM SERPL-SCNC: 137 MMOL/L (ref 135–145)
TRIGL SERPL-MCNC: 380 MG/DL (ref 0–149)
VLDLC SERPL-MCNC: 60 MG/DL (ref 5–40)

## 2023-04-10 PROCEDURE — 80053 COMPREHEN METABOLIC PANEL: CPT

## 2023-04-10 PROCEDURE — 82043 UR ALBUMIN QUANTITATIVE: CPT

## 2023-04-10 PROCEDURE — 83036 HEMOGLOBIN GLYCOSYLATED A1C: CPT

## 2023-04-10 PROCEDURE — 36415 COLL VENOUS BLD VENIPUNCTURE: CPT

## 2023-04-10 PROCEDURE — 80061 LIPID PANEL: CPT

## 2023-04-10 PROCEDURE — 82570 ASSAY OF URINE CREATININE: CPT

## 2023-04-17 ENCOUNTER — OFFICE VISIT (OUTPATIENT)
Dept: INTERNAL MEDICINE | Facility: CLINIC | Age: 38
End: 2023-04-17
Payer: COMMERCIAL

## 2023-04-17 VITALS
RESPIRATION RATE: 14 BRPM | SYSTOLIC BLOOD PRESSURE: 124 MMHG | WEIGHT: 315 LBS | HEIGHT: 72 IN | OXYGEN SATURATION: 95 % | HEART RATE: 95 BPM | BODY MASS INDEX: 42.66 KG/M2 | DIASTOLIC BLOOD PRESSURE: 80 MMHG

## 2023-04-17 DIAGNOSIS — Z00.00 ENCOUNTER FOR PREVENTIVE CARE: Primary | ICD-10-CM

## 2023-04-17 DIAGNOSIS — E78.1 HYPERTRIGLYCERIDEMIA: ICD-10-CM

## 2023-04-17 DIAGNOSIS — I10 ESSENTIAL HYPERTENSION: ICD-10-CM

## 2023-04-17 DIAGNOSIS — E11.9 TYPE 2 DIABETES MELLITUS WITHOUT COMPLICATION, WITHOUT LONG-TERM CURRENT USE OF INSULIN: ICD-10-CM

## 2023-04-17 DIAGNOSIS — Z13.31 DEPRESSION SCREEN: ICD-10-CM

## 2023-04-17 DIAGNOSIS — E66.01 MORBID (SEVERE) OBESITY DUE TO EXCESS CALORIES: ICD-10-CM

## 2023-04-17 RX ORDER — ORAL SEMAGLUTIDE 14 MG/1
14 TABLET ORAL DAILY
Qty: 30 TABLET | Refills: 5 | Status: SHIPPED | OUTPATIENT
Start: 2023-04-17

## 2023-04-17 NOTE — PROGRESS NOTES
Chief Complaint   Patient presents with   • Annual Exam     Patient is here for annual exam.         History:  Gagandeep Ordoñez is a 37 y.o. male who presents today for evaluation of the above problems.      Annual exam    Eye exam in February.    Has a dentist.    Does not want vaccines today.    No family history of colon cancer.     He is not a smoker. Drinks alcohol on occasion.    Denies depressive symptoms or suicidal ideation.    Active with work, plans to work on diet and exercise. Portion control is a problem. He hasn't really lost weight on Rybelsus 7 mg daily.    Type 2 diabetes, hypertension, hyperlipidemia. Labs were obtained prior to this visit and are reviewed and discussed with patient today.                Social History     Socioeconomic History   • Marital status:    Tobacco Use   • Smoking status: Never   • Smokeless tobacco: Former   Vaping Use   • Vaping Use: Never used   Substance and Sexual Activity   • Alcohol use: Yes     Comment: Rarely   • Drug use: No   • Sexual activity: Yes     Partners: Female       PHQ-9 Depression Screening  Little interest or pleasure in doing things? 0-->not at all   Feeling down, depressed, or hopeless? 0-->not at all   Trouble falling or staying asleep, or sleeping too much?     Feeling tired or having little energy?     Poor appetite or overeating?     Feeling bad about yourself - or that you are a failure or have let yourself or your family down?     Trouble concentrating on things, such as reading the newspaper or watching television?     Moving or speaking so slowly that other people could have noticed? Or the opposite - being so fidgety or restless that you have been moving around a lot more than usual?     Thoughts that you would be better off dead, or of hurting yourself in some way?     PHQ-9 Total Score 0   If you checked off any problems, how difficult have these problems made it for you to do your work, take care of things at home, or get along  with other people?         PHQ-9 Total Score: 0    ROS:  Review of Systems   Constitutional: Negative for activity change, appetite change, fever and unexpected weight change.   Eyes: Negative for visual disturbance.   Respiratory: Negative for cough and shortness of breath.    Cardiovascular: Negative for chest pain and leg swelling.   Gastrointestinal: Negative for abdominal pain, blood in stool, constipation and diarrhea.   Genitourinary: Negative for dysuria.   Neurological: Negative for dizziness and headaches.   Psychiatric/Behavioral: Negative for suicidal ideas.         Current Outpatient Medications:   •  amLODIPine (NORVASC) 10 MG tablet, TAKE 1 TABLET BY MOUTH ONCE DAILY, Disp: 30 tablet, Rfl: 5  •  diphenoxylate-atropine (LOMOTIL) 2.5-0.025 MG per tablet, Take 1 tablet by mouth As Needed., Disp: , Rfl:   •  FLUoxetine (PROzac) 40 MG capsule, Take 1 capsule by mouth Daily., Disp: 30 capsule, Rfl: 5  •  hydroCHLOROthiazide (HYDRODIURIL) 50 MG tablet, TAKE 1 TABLET BY MOUTH ONCE DAILY, Disp: 30 tablet, Rfl: 5  •  losartan (COZAAR) 50 MG tablet, Take 2 tablets by mouth Daily., Disp: 60 tablet, Rfl: 5  •  metFORMIN (GLUCOPHAGE) 1000 MG tablet, TAKE 1 TABLET BY MOUTH TWICE DAILY WITH MEALS, Disp: 60 tablet, Rfl: 5  •  omeprazole (priLOSEC) 40 MG capsule, TAKE 1 CAPSULE BY MOUTH ONCE DAILY, Disp: 30 capsule, Rfl: 11  •  True Metrix Blood Glucose Test test strip, TEST BLOOD SUGAR FOUR TIMES DAILY, Disp: 100 each, Rfl: 11  •  Semaglutide (Rybelsus) 14 MG tablet, Take 1 tablet by mouth Daily., Disp: 30 tablet, Rfl: 5    Lab Results   Component Value Date    GLUCOSE 176 (H) 04/10/2023    BUN 17 04/10/2023    CREATININE 0.83 04/10/2023    EGFR 114.2 04/10/2023    BCR 20.5 04/10/2023    K 4.3 04/10/2023    CO2 31.0 04/10/2023    CALCIUM 9.2 04/10/2023    ALBUMIN 4.3 04/10/2023    BILITOT 0.4 04/10/2023    AST 43 04/10/2023    ALT 66 (H) 04/10/2023       WBC   Date Value Ref Range Status   04/08/2022 7.24 3.40 - 10.80  10*3/mm3 Final   02/20/2019 10.3 4.8 - 10.8 K/uL Final     RBC   Date Value Ref Range Status   04/08/2022 5.18 4.14 - 5.80 10*6/mm3 Final   02/20/2019 5.25 4.70 - 6.10 M/uL Final     Hemoglobin   Date Value Ref Range Status   04/08/2022 14.6 13.0 - 17.7 g/dL Final   02/20/2019 15.1 14.0 - 18.0 g/dL Final     Hematocrit   Date Value Ref Range Status   04/08/2022 44.1 37.5 - 51.0 % Final   02/20/2019 45.6 42.0 - 52.0 % Final     MCV   Date Value Ref Range Status   04/08/2022 85.1 79.0 - 97.0 fL Final   02/20/2019 86.9 80.0 - 94.0 fL Final     MCH   Date Value Ref Range Status   04/08/2022 28.2 26.6 - 33.0 pg Final   02/20/2019 28.8 27.0 - 31.0 pg Final     MCHC   Date Value Ref Range Status   04/08/2022 33.1 31.5 - 35.7 g/dL Final   02/20/2019 33.1 33.0 - 37.0 g/dL Final     RDW   Date Value Ref Range Status   04/08/2022 12.5 12.3 - 15.4 % Final   02/20/2019 12.9 11.5 - 14.5 % Final     RDW-SD   Date Value Ref Range Status   04/08/2022 38.8 37.0 - 54.0 fl Final     MPV   Date Value Ref Range Status   04/08/2022 11.8 6.0 - 12.0 fL Final   02/20/2019 11.2 9.4 - 12.4 fL Final     Platelets   Date Value Ref Range Status   04/08/2022 335 140 - 450 10*3/mm3 Final   02/20/2019 292 130 - 400 K/uL Final     Neutrophil Rel %   Date Value Ref Range Status   02/20/2019 56.4 50.0 - 65.0 % Final     Neutrophil %   Date Value Ref Range Status   08/31/2021 75.6 42.7 - 76.0 % Final     Lymphocyte Rel %   Date Value Ref Range Status   02/20/2019 33.2 20.0 - 40.0 % Final     Lymphocyte %   Date Value Ref Range Status   08/31/2021 16.1 (L) 19.6 - 45.3 % Final     Monocyte Rel %   Date Value Ref Range Status   02/20/2019 7.9 0.0 - 10.0 % Final     Monocyte %   Date Value Ref Range Status   08/31/2021 5.3 5.0 - 12.0 % Final     Eosinophil Rel %   Date Value Ref Range Status   02/20/2019 1.4 0.0 - 5.0 % Final     Eosinophil %   Date Value Ref Range Status   08/31/2021 0.9 0.3 - 6.2 % Final     Basophil Rel %   Date Value Ref Range Status  "  02/20/2019 0.7 0.0 - 1.0 % Final     Basophil %   Date Value Ref Range Status   08/31/2021 0.5 0.0 - 1.5 % Final     Immature Grans %   Date Value Ref Range Status   08/31/2021 1.6 (H) 0.0 - 0.5 % Final     Neutrophils Absolute   Date Value Ref Range Status   02/20/2019 5.8 1.5 - 7.5 K/uL Final     Neutrophils, Absolute   Date Value Ref Range Status   08/31/2021 10.23 (H) 1.70 - 7.00 10*3/mm3 Final     Lymphocytes Absolute   Date Value Ref Range Status   02/20/2019 3.4 1.1 - 4.5 K/uL Final     Lymphocytes, Absolute   Date Value Ref Range Status   08/31/2021 2.18 0.70 - 3.10 10*3/mm3 Final     Monocytes Absolute   Date Value Ref Range Status   02/20/2019 0.80 0.00 - 0.90 K/uL Final     Monocytes, Absolute   Date Value Ref Range Status   08/31/2021 0.71 0.10 - 0.90 10*3/mm3 Final     Eosinophils Absolute   Date Value Ref Range Status   02/20/2019 0.10 0.00 - 0.60 K/uL Final     Eosinophils, Absolute   Date Value Ref Range Status   08/31/2021 0.12 0.00 - 0.40 10*3/mm3 Final     Basophils Absolute   Date Value Ref Range Status   02/20/2019 0.10 0.00 - 0.20 K/uL Final     Basophils, Absolute   Date Value Ref Range Status   08/31/2021 0.07 0.00 - 0.20 10*3/mm3 Final     Immature Grans, Absolute   Date Value Ref Range Status   08/31/2021 0.21 (H) 0.00 - 0.05 10*3/mm3 Final     nRBC   Date Value Ref Range Status   08/31/2021 0.0 0.0 - 0.2 /100 WBC Final         OBJECTIVE:  Visit Vitals  /80 (BP Location: Left arm, Patient Position: Sitting, Cuff Size: Adult)   Pulse 95   Resp 14   Ht 182.9 cm (72\")   Wt (!) 143 kg (316 lb)   SpO2 95%   BMI 42.86 kg/m²      Physical Exam  Vitals and nursing note reviewed.   Constitutional:       General: He is not in acute distress.     Appearance: Normal appearance. He is not ill-appearing, toxic-appearing or diaphoretic.   HENT:      Head: Normocephalic and atraumatic.      Right Ear: Tympanic membrane, ear canal and external ear normal.      Left Ear: Tympanic membrane, ear canal " and external ear normal.      Ears:      Comments: I removed cerumen from the external edge of both canals with a cotton-tipped applicator. TM's normal and canals clear.     Nose: No congestion or rhinorrhea.      Mouth/Throat:      Mouth: Mucous membranes are moist.      Pharynx: Oropharynx is clear.   Eyes:      General: No scleral icterus.        Right eye: No discharge.         Left eye: No discharge.      Conjunctiva/sclera: Conjunctivae normal.   Neck:      Vascular: No carotid bruit.      Comments: No thyromegaly or mass appreciated.    Cardiovascular:      Rate and Rhythm: Normal rate and regular rhythm.      Heart sounds: Normal heart sounds. No murmur heard.  Pulmonary:      Effort: No respiratory distress.      Breath sounds: Normal breath sounds. No wheezing.   Abdominal:      General: There is no distension.      Palpations: Abdomen is soft.      Tenderness: There is no abdominal tenderness.   Musculoskeletal:      Cervical back: Neck supple. No tenderness.      Right lower leg: No edema.      Left lower leg: No edema.   Lymphadenopathy:      Cervical: No cervical adenopathy.   Skin:     General: Skin is warm and dry.   Neurological:      Mental Status: He is alert and oriented to person, place, and time.   Psychiatric:         Mood and Affect: Mood normal.         Behavior: Behavior normal.         Thought Content: Thought content normal.         Judgment: Judgment normal.     Labs reviewed. A1C is up to 7.7%, glucose 176. Triglycerides 280, but LDL good at 74.    Assessment/Plan    Diagnoses and all orders for this visit:    1. Encounter for preventive care (Primary)    2. Type 2 diabetes mellitus without complication, without long-term current use of insulin  -     Semaglutide (Rybelsus) 14 MG tablet; Take 1 tablet by mouth Daily.  Dispense: 30 tablet; Refill: 5    3. Essential hypertension    4. Morbid (severe) obesity due to excess calories    5. Hypertriglyceridemia    6. Depression  screen    Depression screen negative. Continue to work on diet for triglycerides.    Counseling/Anticipatory Guidance Discussed: nutrition, physical activity, healthy weight, injury prevention, misuse of tobacco, alcohol and drugs, sexual behavior and STDs, dental health, mental health and immunizations    Class 3 Severe Obesity (BMI >=40). Obesity-related health conditions include the following: hypertension, diabetes mellitus and dyslipidemias. Obesity is unchanged. BMI is is above average; BMI management plan is completed. We discussed low calorie, low carb based diet program, portion control, increasing exercise and pharmacologic options including Ozempic..    As a separate issue today, we discussed diabetic management. He is already on 7 mg semaglutide oral daily. He does not want to change to an injection at this time. I will increase to 14 mg daily for diabetic management, and if he decides to try Ozempic instead, we still have room to go up for weight loss (I reviewed Up-to-Date today, and he would start on 0.5 mg Ozempic weekly if we change from 14 mg PO daily.)    Blood pressure well-controlled, continue same.      Return in about 3 months (around 7/17/2023) for follow up with Dr. Sanchez with A1C.    Patient was given instructions and counseling regarding his/her condition or for health maintenance advice. Please see specific information pulled into the AVS if appropriate.     KARUNA Ocasio  15:25 CDT  4/18/2023

## 2023-04-26 ENCOUNTER — TELEPHONE (OUTPATIENT)
Dept: NEUROLOGY | Facility: CLINIC | Age: 38
End: 2023-04-26
Payer: COMMERCIAL

## 2023-04-26 NOTE — TELEPHONE ENCOUNTER
CALLED PATIENT TO LET HIM KNOW THAT [facility] HAD TO CANCEL HIS AFTERNOON CLINIC DUE TO AN EMERGENCY. PATIENT DID NOT ANSWER BUT I LEFT A DETAILED MESSAGE OF WHAT WAS GOING ON AND THAT I WAS GOING TO GO AHEAD AND MOVE HIS APPOINTMENT TO MAY 15TH , TOLD HIM TO CALL BACK WITH ANY QUESTIONS

## 2023-04-27 NOTE — TELEPHONE ENCOUNTER
Rx Refill Note  Requested Prescriptions     Pending Prescriptions Disp Refills   • True Metrix Blood Glucose Test test strip [Pharmacy Med Name: TRUE METRIX GLUCOSE TEST STRIP]  0     Sig: TEST BLOOD SUGAR FOUR TIMES DAILY      Last office visit with prescribing clinician: 10/10/2022   Last telemedicine visit with prescribing clinician: 7/17/2023   Next office visit with prescribing clinician: Visit date not found                         Would you like a call back once the refill request has been completed: [] Yes [] No    If the office needs to give you a call back, can they leave a voicemail: [] Yes [] No    ANAYA العراقي  04/27/23, 15:38 CDT

## 2023-05-12 ENCOUNTER — TELEPHONE (OUTPATIENT)
Dept: NEUROLOGY | Facility: CLINIC | Age: 38
End: 2023-05-12
Payer: COMMERCIAL

## 2023-05-12 NOTE — TELEPHONE ENCOUNTER
Gave patient a courtesy call about their appointment with our office tomorrow. Patient voiced they were aware.

## 2023-07-26 ENCOUNTER — TELEPHONE (OUTPATIENT)
Dept: INTERNAL MEDICINE | Facility: CLINIC | Age: 38
End: 2023-07-26

## 2023-07-26 NOTE — TELEPHONE ENCOUNTER
"  Caller: Gagandeep Ordoñez \"Vincent\"    Relationship: Self    Best call back number: [phone]     What is the best time to reach you: ANYTIME    Who are you requesting to speak with (clinical staff, provider,  specific staff member): CLINICAL      What was the call regarding: NEEDS TO FOLLOW UP WITH FILIPPO FROM LAST WEEK    Is it okay if the provider responds through MyChart: NO          "

## 2023-07-26 NOTE — TELEPHONE ENCOUNTER
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  PATIENT WAS JUST SEEN FOR ANNUAL AND IS ASKING IF HE COULD DROP OFF THE PAPER WORK TO BE DONE.      COULD HE JUST DROP OFF PAPER WORK OR DOES HE NEED AN APPOINTMENT?

## 2023-08-29 DIAGNOSIS — Z56.6 STRESS AT WORK: ICD-10-CM

## 2023-08-29 RX ORDER — LOSARTAN POTASSIUM 100 MG/1
100 TABLET ORAL DAILY
Qty: 30 TABLET | Refills: 5 | Status: SHIPPED | OUTPATIENT
Start: 2023-08-29

## 2023-08-29 RX ORDER — FLUOXETINE HYDROCHLORIDE 40 MG/1
40 CAPSULE ORAL DAILY
Qty: 30 CAPSULE | Refills: 5 | Status: SHIPPED | OUTPATIENT
Start: 2023-08-29

## 2023-08-29 SDOH — HEALTH STABILITY - MENTAL HEALTH: OTHER PHYSICAL AND MENTAL STRAIN RELATED TO WORK: Z56.6

## 2023-08-29 NOTE — TELEPHONE ENCOUNTER
Rx Refill Note  Requested Prescriptions     Pending Prescriptions Disp Refills    FLUoxetine (PROzac) 40 MG capsule [Pharmacy Med Name: FLUOXETINE HCL 40 MG CAPSULE] 30 capsule 0     Sig: TAKE 1 CAPSULE BY MOUTH ONCE DAILY    losartan (COZAAR) 50 MG tablet [Pharmacy Med Name: LOSARTAN POTASSIUM 50 MG TAB] 60 tablet 0     Sig: TAKE 2 TABLETS BY MOUTH ONCE DAILY      Last office visit with prescribing clinician: 10/10/2022   Last telemedicine visit with prescribing clinician: Visit date not found   Next office visit with prescribing clinician: 10/23/2023                         Would you like a call back once the refill request has been completed: [] Yes [] No    If the office needs to give you a call back, can they leave a voicemail: [] Yes [] No    Camilo Cruz MA  08/29/23, 13:01 CDT

## 2023-09-11 ENCOUNTER — OFFICE VISIT (OUTPATIENT)
Dept: NEUROLOGY | Facility: CLINIC | Age: 38
End: 2023-09-11
Payer: COMMERCIAL

## 2023-09-11 VITALS
HEIGHT: 72 IN | WEIGHT: 305 LBS | HEART RATE: 85 BPM | DIASTOLIC BLOOD PRESSURE: 80 MMHG | BODY MASS INDEX: 41.31 KG/M2 | OXYGEN SATURATION: 97 % | SYSTOLIC BLOOD PRESSURE: 140 MMHG

## 2023-09-11 DIAGNOSIS — G47.33 OBSTRUCTIVE SLEEP APNEA ON CPAP: Primary | ICD-10-CM

## 2023-09-11 DIAGNOSIS — Z99.89 OBSTRUCTIVE SLEEP APNEA ON CPAP: Primary | ICD-10-CM

## 2023-09-11 PROCEDURE — 99213 OFFICE O/P EST LOW 20 MIN: CPT | Performed by: NURSE PRACTITIONER

## 2023-09-11 NOTE — PROGRESS NOTES
Neurology Progress Note    Chief Complaint:    Obstructive Sleep Apnea    Subjective   History of Present Illness:  Gagandeep Ordoñez is a 37 y.o. male who presents today for obstructive sleep apnea.  He is routinely followed by HENRIETTA Sanchez MD for primary care.     Obstructive Sleep Apnea  He continues to report that he is doing well with the use of his CPAP.  He denies any significant issues with regard to falling asleep, staying asleep, or nonrestorative sleep.  He denies any new symptoms at this time.  He reports compliance with his therapy.    Allergies:    Patient has no known allergies.    Medications:  Current Outpatient Medications   Medication Sig Dispense Refill    amLODIPine (NORVASC) 10 MG tablet Take 1 tablet by mouth Daily. 30 tablet 5    diphenoxylate-atropine (LOMOTIL) 2.5-0.025 MG per tablet Take 1 tablet by mouth As Needed.      FLUoxetine (PROzac) 40 MG capsule Take 1 capsule by mouth Daily. 30 capsule 5    glucose blood (True Metrix Blood Glucose Test) test strip Test glucose once daily  each 0    hydroCHLOROthiazide (HYDRODIURIL) 50 MG tablet Take 1 tablet by mouth Daily. 30 tablet 5    losartan (COZAAR) 100 MG tablet Take 1 tablet by mouth Daily. 30 tablet 5    metFORMIN (GLUCOPHAGE) 1000 MG tablet Take 1 tablet by mouth 2 (Two) Times a Day With Meals. 60 tablet 5    omeprazole (priLOSEC) 40 MG capsule TAKE 1 CAPSULE BY MOUTH ONCE DAILY 30 capsule 11    Semaglutide, 1 MG/DOSE, (Ozempic, 1 MG/DOSE,) 4 MG/3ML solution pen-injector Inject 1 mg under the skin into the appropriate area as directed 1 (One) Time Per Week. Begin this after the 0.5 mg dose has been completed for 4 weeks. 3 mL 5     No current facility-administered medications for this visit.     Current outpatient and discharge medications have been reconciled for the patient.  Reviewed by: KARUNA Muniz    Past Medical History:  Past Medical History:   Diagnosis Date    Acid reflux     Back pain     Hypertension      "Sleep apnea     wears cpap    Type 2 diabetes mellitus      Past Surgical History:   Procedure Laterality Date    CHOLECYSTECTOMY LAPAROSCOPIC INTRAOPERATIVE CHOLANGIOGRAM WITH LIVER BIOPSY N/A 8/10/2021    Procedure: LAPAROSCOPIC CHOLECYSTECTOMY, LAPAROSCOPIC LIVER BIOPSY,;  Surgeon: Jaclyn Handy MD;  Location: [facility];  Service: General;  Laterality: N/A;    HAND SURGERY      HERNIA REPAIR      VASECTOMY  04/15/2021     Family History   Problem Relation Age of Onset    Diabetes Mother     Stroke Father         X4    Heart attack Maternal Grandmother     Lung cancer Maternal Grandfather      Social History     Tobacco Use    Smoking status: Never    Smokeless tobacco: Former   Vaping Use    Vaping Use: Never used   Substance Use Topics    Alcohol use: Yes     Comment: Rarely    Drug use: No     Review of Systems   Psychiatric/Behavioral:  Positive for sleep disturbance.        Objective   Vital Signs:  Heart Rate:  [85] 85  BP: (140)/(80) 140/80      09/11/23  1602   Weight: (!) 138 kg (305 lb)     182.9 cm (72\")  Body mass index is 41.37 kg/m².    Physical Exam  Vitals reviewed.   Constitutional:       Appearance: Normal appearance.   HENT:      Head: Normocephalic.      Mouth/Throat:      Pharynx: Oropharynx is clear.   Eyes:      General: Lids are normal.      Extraocular Movements: Extraocular movements intact.      Pupils: Pupils are equal, round, and reactive to light.   Cardiovascular:      Rate and Rhythm: Normal rate and regular rhythm.      Pulses: Normal pulses.   Pulmonary:      Effort: Pulmonary effort is normal.   Musculoskeletal:         General: Normal range of motion.      Cervical back: Normal range of motion and neck supple.   Skin:     General: Skin is warm and dry.      Capillary Refill: Capillary refill takes less than 2 seconds.   Neurological:      Motor: Motor strength is normal.      Coordination: Coordination is intact.      Deep Tendon Reflexes: Reflexes are normal and " symmetric.   Psychiatric:         Mood and Affect: Mood normal.         Speech: Speech normal.     Neurological Exam  Mental Status  Awake, alert and oriented to person, place and time. Recent and remote memory are intact. Speech is normal. Language is fluent with no aphasia. Attention and concentration are normal.    Cranial Nerves  CN II: Visual acuity is normal. Visual fields full to confrontation.  CN III, IV, VI: Extraocular movements intact bilaterally. Normal lids and orbits bilaterally. Pupils equal round and reactive to light bilaterally.  CN V: Facial sensation is normal.  CN VII: Full and symmetric facial movement.  CN IX, X: Palate elevates symmetrically. Normal gag reflex.  CN XI: Shoulder shrug strength is normal.  CN XII: Tongue midline without atrophy or fasciculations.    Motor   Strength is 5/5 throughout all four extremities.    Sensory  Sensation is intact to light touch, pinprick, vibration and proprioception in all four extremities.    Reflexes  Deep tendon reflexes are 2+ and symmetric in all four extremities.    Coordination    Finger-to-nose, rapid alternating movements and heel-to-shin normal bilaterally without dysmetria.    Gait  Normal casual, toe, heel and tandem gait.  Neck Circumference: 19.5 inches  Mallampati Score: 4    [place] Sleepiness Scale: 9  STOP-BANG: Intermediate    Compliance Report:  This report is for the dates of 8/12/2023-9/10/2023.  Patient use a device for 24/30 days for 97%  compliance.  Of the states patient use a device for greater than 4 hours for 27 days for 90% compliance.  Patient is currently set on APAP with 8-16 cm H2O pressures.  AHI is currently four-point     Results Review:    Lab Results   Component Value Date    GLUCOSE 176 (H) 04/10/2023    BUN 17 04/10/2023    CREATININE 0.83 04/10/2023    EGFRIFNONA 101 08/31/2021    BCR 20.5 04/10/2023    K 4.3 04/10/2023    CO2 31.0 04/10/2023    CALCIUM 9.2 04/10/2023    ALBUMIN 4.3 04/10/2023    AST 43  04/10/2023    ALT 66 (H) 04/10/2023     Lab Results   Component Value Date    WBC 7.24 04/08/2022    HGB 14.6 04/08/2022    HCT 44.1 04/08/2022    MCV 85.1 04/08/2022     04/08/2022     Lab Results   Component Value Date    CHOL 177 04/10/2023    TRIG 380 (H) 04/10/2023    HDL 43 04/10/2023    LDL 74 04/10/2023     Lab Results   Component Value Date    TSH 1.730 12/23/2020     Lab Results   Component Value Date    HGBA1C 7.5 07/17/2023     No results found for: FOLATE  No results found for: [id number]       Plan .  Assessment:  Gagandeep Ordoñez is a 37 y.o. male who presents with obstructive sleep apnea.  He continues to do well with no new complaints with regard to his therapy.  He is compliant with therapy at this time and therapy does continue to treat his sleep apnea appropriately.  Have no new recommendations for changes at this time.  Did discuss the changes within sleep medicine within our facility and we will get him referred out to [place] [facility].  He is understanding and agreeable with this.    Plan:  Continue CPAP.  Encouraged Compliance  Recommend a regular sleep schedule and sleep hygiene  Discussed risks of untreated sleep apnea  Recommend regular physical activity and a balanced diet  Call me with any questions or concerns.    The patient and I have discussed the plan of care and he is in full agreement at this time.     Follow-Up:  Return if symptoms worsen or fail to improve.       Toni Abdi, KARUNA  09/11/23  16:34 CDT

## 2023-10-23 ENCOUNTER — OFFICE VISIT (OUTPATIENT)
Dept: INTERNAL MEDICINE | Facility: CLINIC | Age: 38
End: 2023-10-23
Payer: COMMERCIAL

## 2023-10-23 VITALS
HEART RATE: 90 BPM | DIASTOLIC BLOOD PRESSURE: 80 MMHG | TEMPERATURE: 97.5 F | BODY MASS INDEX: 41.8 KG/M2 | HEIGHT: 72 IN | SYSTOLIC BLOOD PRESSURE: 134 MMHG | WEIGHT: 308.6 LBS | OXYGEN SATURATION: 98 %

## 2023-10-23 DIAGNOSIS — E11.9 TYPE 2 DIABETES MELLITUS WITHOUT COMPLICATION, WITHOUT LONG-TERM CURRENT USE OF INSULIN: Primary | ICD-10-CM

## 2023-10-23 DIAGNOSIS — E78.1 HYPERTRIGLYCERIDEMIA: ICD-10-CM

## 2023-10-23 DIAGNOSIS — I10 ESSENTIAL HYPERTENSION: ICD-10-CM

## 2023-10-23 DIAGNOSIS — Z00.00 HEALTHCARE MAINTENANCE: ICD-10-CM

## 2023-10-23 LAB
EXPIRATION DATE: ABNORMAL
HBA1C MFR BLD: 7.2 % (ref 4.5–5.7)
Lab: ABNORMAL

## 2023-10-23 NOTE — PROGRESS NOTES
Chief Complaint   Patient presents with    Follow-up    Diabetes         History:  Gagandeep Ordoñez is a 37 y.o. male who presents today for evaluation of the above problems.        3 month follow-up on diabetes.  His A1c was 7.5% on July 17, 2023.  He was transitioned off of Rybelsus and placed on Ozempic.  He is now on Ozempic 1 mg weekly.  His A1c has improved to 7.2%.  Doing well with Ozempic without any side effects or issues.    Blood pressure is at goal with amlodipine 10 mg daily, losartan 100 mg daily and hydrochlorothiazide 50 mg daily.  He does not think he needs any refills at this time.    HPI      ROS:  Review of Systems    See above    Current Outpatient Medications:     amLODIPine (NORVASC) 10 MG tablet, Take 1 tablet by mouth Daily., Disp: 30 tablet, Rfl: 5    diphenoxylate-atropine (LOMOTIL) 2.5-0.025 MG per tablet, Take 1 tablet by mouth As Needed., Disp: , Rfl:     FLUoxetine (PROzac) 40 MG capsule, Take 1 capsule by mouth Daily., Disp: 30 capsule, Rfl: 5    glucose blood (True Metrix Blood Glucose Test) test strip, Test glucose once daily PRN, Disp: 100 each, Rfl: 0    hydroCHLOROthiazide (HYDRODIURIL) 50 MG tablet, Take 1 tablet by mouth Daily., Disp: 30 tablet, Rfl: 5    losartan (COZAAR) 100 MG tablet, Take 1 tablet by mouth Daily., Disp: 30 tablet, Rfl: 5    metFORMIN (GLUCOPHAGE) 1000 MG tablet, Take 1 tablet by mouth 2 (Two) Times a Day With Meals., Disp: 60 tablet, Rfl: 5    omeprazole (priLOSEC) 40 MG capsule, TAKE 1 CAPSULE BY MOUTH ONCE DAILY, Disp: 30 capsule, Rfl: 11    Semaglutide, 1 MG/DOSE, (Ozempic, 1 MG/DOSE,) 4 MG/3ML solution pen-injector, Inject 1 mg under the skin into the appropriate area as directed 1 (One) Time Per Week. Begin this after the 0.5 mg dose has been completed for 4 weeks., Disp: 3 mL, Rfl: 5    Lab Results   Component Value Date    GLUCOSE 176 (H) 04/10/2023    BUN 17 04/10/2023    CREATININE 0.83 04/10/2023    EGFR 114.2 04/10/2023    BCR 20.5 04/10/2023    K  4.3 04/10/2023    CO2 31.0 04/10/2023    CALCIUM 9.2 04/10/2023    ALBUMIN 4.3 04/10/2023    BILITOT 0.4 04/10/2023    AST 43 04/10/2023    ALT 66 (H) 04/10/2023       WBC   Date Value Ref Range Status   04/08/2022 7.24 3.40 - 10.80 10*3/mm3 Final   02/20/2019 10.3 4.8 - 10.8 K/uL Final     RBC   Date Value Ref Range Status   04/08/2022 5.18 4.14 - 5.80 10*6/mm3 Final   02/20/2019 5.25 4.70 - 6.10 M/uL Final     Hemoglobin   Date Value Ref Range Status   04/08/2022 14.6 13.0 - 17.7 g/dL Final   02/20/2019 15.1 14.0 - 18.0 g/dL Final     Hematocrit   Date Value Ref Range Status   04/08/2022 44.1 37.5 - 51.0 % Final   02/20/2019 45.6 42.0 - 52.0 % Final     MCV   Date Value Ref Range Status   04/08/2022 85.1 79.0 - 97.0 fL Final   02/20/2019 86.9 80.0 - 94.0 fL Final     MCH   Date Value Ref Range Status   04/08/2022 28.2 26.6 - 33.0 pg Final   02/20/2019 28.8 27.0 - 31.0 pg Final     MCHC   Date Value Ref Range Status   04/08/2022 33.1 31.5 - 35.7 g/dL Final   02/20/2019 33.1 33.0 - 37.0 g/dL Final     RDW   Date Value Ref Range Status   04/08/2022 12.5 12.3 - 15.4 % Final   02/20/2019 12.9 11.5 - 14.5 % Final     RDW-SD   Date Value Ref Range Status   04/08/2022 38.8 37.0 - 54.0 fl Final     MPV   Date Value Ref Range Status   04/08/2022 11.8 6.0 - 12.0 fL Final   02/20/2019 11.2 9.4 - 12.4 fL Final     Platelets   Date Value Ref Range Status   04/08/2022 335 140 - 450 10*3/mm3 Final   02/20/2019 292 130 - 400 K/uL Final     Neutrophil Rel %   Date Value Ref Range Status   02/20/2019 56.4 50.0 - 65.0 % Final     Neutrophil %   Date Value Ref Range Status   08/31/2021 75.6 42.7 - 76.0 % Final     Lymphocyte Rel %   Date Value Ref Range Status   02/20/2019 33.2 20.0 - 40.0 % Final     Lymphocyte %   Date Value Ref Range Status   08/31/2021 16.1 (L) 19.6 - 45.3 % Final     Monocyte Rel %   Date Value Ref Range Status   02/20/2019 7.9 0.0 - 10.0 % Final     Monocyte %   Date Value Ref Range Status   08/31/2021 5.3 5.0 -  "12.0 % Final     Eosinophil Rel %   Date Value Ref Range Status   02/20/2019 1.4 0.0 - 5.0 % Final     Eosinophil %   Date Value Ref Range Status   08/31/2021 0.9 0.3 - 6.2 % Final     Basophil Rel %   Date Value Ref Range Status   02/20/2019 0.7 0.0 - 1.0 % Final     Basophil %   Date Value Ref Range Status   08/31/2021 0.5 0.0 - 1.5 % Final     Immature Grans %   Date Value Ref Range Status   08/31/2021 1.6 (H) 0.0 - 0.5 % Final     Neutrophils Absolute   Date Value Ref Range Status   02/20/2019 5.8 1.5 - 7.5 K/uL Final     Neutrophils, Absolute   Date Value Ref Range Status   08/31/2021 10.23 (H) 1.70 - 7.00 10*3/mm3 Final     Lymphocytes Absolute   Date Value Ref Range Status   02/20/2019 3.4 1.1 - 4.5 K/uL Final     Lymphocytes, Absolute   Date Value Ref Range Status   08/31/2021 2.18 0.70 - 3.10 10*3/mm3 Final     Monocytes Absolute   Date Value Ref Range Status   02/20/2019 0.80 0.00 - 0.90 K/uL Final     Monocytes, Absolute   Date Value Ref Range Status   08/31/2021 0.71 0.10 - 0.90 10*3/mm3 Final     Eosinophils Absolute   Date Value Ref Range Status   02/20/2019 0.10 0.00 - 0.60 K/uL Final     Eosinophils, Absolute   Date Value Ref Range Status   08/31/2021 0.12 0.00 - 0.40 10*3/mm3 Final     Basophils Absolute   Date Value Ref Range Status   02/20/2019 0.10 0.00 - 0.20 K/uL Final     Basophils, Absolute   Date Value Ref Range Status   08/31/2021 0.07 0.00 - 0.20 10*3/mm3 Final     Immature Grans, Absolute   Date Value Ref Range Status   08/31/2021 0.21 (H) 0.00 - 0.05 10*3/mm3 Final     nRBC   Date Value Ref Range Status   08/31/2021 0.0 0.0 - 0.2 /100 WBC Final         OBJECTIVE:  Visit Vitals  /80 (BP Location: Left arm, Patient Position: Sitting, Cuff Size: Large Adult)   Pulse 90   Temp 97.5 °F (36.4 °C) (Temporal)   Ht 182.9 cm (72\")   Wt (!) 140 kg (308 lb 9.6 oz)   SpO2 98%   BMI 41.85 kg/m²      Physical Exam  Vitals and nursing note reviewed.   Constitutional:       General: He is not in " acute distress.     Appearance: Normal appearance. He is well-developed. He is not ill-appearing or toxic-appearing.      Comments: Pleasant   HENT:      Head: Normocephalic and atraumatic.   Eyes:      Pupils: Pupils are equal, round, and reactive to light.   Neck:      Thyroid: No thyromegaly.      Trachea: Phonation normal.   Cardiovascular:      Rate and Rhythm: Normal rate.   Pulmonary:      Effort: No respiratory distress.   Skin:     Coloration: Skin is not pale.      Findings: No erythema.   Neurological:      Mental Status: He is alert.   Psychiatric:         Behavior: Behavior normal.         Thought Content: Thought content normal.         Judgment: Judgment normal.         Assessment/Plan    Diagnoses and all orders for this visit:    1. Type 2 diabetes mellitus without complication, without long-term current use of insulin (Primary)  -     POC Glycosylated Hemoglobin (Hb A1C)  -     Hemoglobin A1c; Future  -     Microalbumin / Creatinine Urine Ratio - Urine, Clean Catch; Future    2. Essential hypertension  -     Comprehensive Metabolic Panel; Future    3. Healthcare maintenance  -     Comprehensive Metabolic Panel; Future  -     Hemoglobin A1c; Future  -     Lipid Panel; Future  -     CBC (No Diff); Future  -     Microalbumin / Creatinine Urine Ratio - Urine, Clean Catch; Future    4. Hypertriglyceridemia  -     Lipid Panel; Future      Continue our current regimen for diabetes since A1c has improved.  Continue adhering to a diabetic diet.    Blood pressure is well controlled.  Continue current regimen    I have ordered some blood work to be done in 6 months a few days before his next visit.  This will be his annual physical.  Follow-up sooner if indicated.    Return in about 6 months (around 4/23/2024) for Annual physical.      CHARMAINE Sanchez MD  16:02 CDT  10/23/2023   Electronically signed

## 2023-10-31 DIAGNOSIS — E11.9 TYPE 2 DIABETES MELLITUS WITHOUT COMPLICATION, WITHOUT LONG-TERM CURRENT USE OF INSULIN: ICD-10-CM

## 2023-10-31 DIAGNOSIS — E11.9 TYPE 2 DIABETES MELLITUS WITHOUT COMPLICATION, WITHOUT LONG-TERM CURRENT USE OF INSULIN: Primary | ICD-10-CM

## 2023-10-31 RX ORDER — TIRZEPATIDE 2.5 MG/.5ML
INJECTION, SOLUTION SUBCUTANEOUS
Qty: 10 ML | Refills: 0 | Status: SHIPPED | OUTPATIENT
Start: 2023-10-31 | End: 2023-10-31 | Stop reason: SDUPTHER

## 2023-10-31 RX ORDER — TIRZEPATIDE 2.5 MG/.5ML
INJECTION, SOLUTION SUBCUTANEOUS
Qty: 10 ML | Refills: 0 | Status: SHIPPED | OUTPATIENT
Start: 2023-10-31 | End: 2024-01-23

## 2023-11-25 DIAGNOSIS — E11.9 TYPE 2 DIABETES MELLITUS WITHOUT COMPLICATION, WITHOUT LONG-TERM CURRENT USE OF INSULIN: ICD-10-CM

## 2023-11-27 RX ORDER — TIRZEPATIDE 2.5 MG/.5ML
0.5 INJECTION, SOLUTION SUBCUTANEOUS WEEKLY
Qty: 0.5 ML | Refills: 3 | Status: SHIPPED | OUTPATIENT
Start: 2023-11-27

## 2023-11-27 NOTE — TELEPHONE ENCOUNTER
Rx Refill Note  Requested Prescriptions     Pending Prescriptions Disp Refills    Mounjaro 2.5 MG/0.5ML solution pen-injector [Pharmacy Med Name: MOUNJARO 2.5 MG/0.5 ML PEN]       Sig: INJECT 0.5 ML UNDER THE SKIN INTO THE APPROPRIATE AREA AS DIRECTED 1 TIME PER WEEK FOR 28 DAYS      Last office visit with prescribing clinician: 7/17/2023   Last telemedicine visit with prescribing clinician: Visit date not found   Next office visit with prescribing clinician: Visit date not found                         Would you like a call back once the refill request has been completed: [] Yes [] No    If the office needs to give you a call back, can they leave a voicemail: [] Yes [] No    Camilo Cruz MA  11/27/23, 08:10 CST

## 2023-12-21 DIAGNOSIS — E11.9 TYPE 2 DIABETES MELLITUS WITHOUT COMPLICATION, WITHOUT LONG-TERM CURRENT USE OF INSULIN: ICD-10-CM

## 2023-12-21 DIAGNOSIS — K21.9 GASTROESOPHAGEAL REFLUX DISEASE, UNSPECIFIED WHETHER ESOPHAGITIS PRESENT: ICD-10-CM

## 2023-12-21 DIAGNOSIS — I10 ESSENTIAL HYPERTENSION: ICD-10-CM

## 2023-12-21 RX ORDER — HYDROCHLOROTHIAZIDE 50 MG/1
50 TABLET ORAL DAILY
Qty: 30 TABLET | Refills: 11 | Status: SHIPPED | OUTPATIENT
Start: 2023-12-21

## 2023-12-21 RX ORDER — AMLODIPINE BESYLATE 10 MG/1
10 TABLET ORAL DAILY
Qty: 30 TABLET | Refills: 11 | Status: SHIPPED | OUTPATIENT
Start: 2023-12-21

## 2023-12-21 RX ORDER — OMEPRAZOLE 40 MG/1
40 CAPSULE, DELAYED RELEASE ORAL DAILY
Qty: 30 CAPSULE | Refills: 11 | Status: SHIPPED | OUTPATIENT
Start: 2023-12-21

## 2023-12-21 NOTE — TELEPHONE ENCOUNTER
Rx Refill Note  Requested Prescriptions     Pending Prescriptions Disp Refills    amLODIPine (NORVASC) 10 MG tablet [Pharmacy Med Name: AMLODIPINE BESYLATE 10 MG TAB] 30 tablet 0     Sig: TAKE 1 TABLET BY MOUTH ONCE DAILY    hydroCHLOROthiazide (HYDRODIURIL) 50 MG tablet [Pharmacy Med Name: HYDROCHLOROTHIAZIDE 50 MG TAB] 30 tablet 0     Sig: TAKE 1 TABLET BY MOUTH ONCE DAILY    omeprazole (priLOSEC) 40 MG capsule [Pharmacy Med Name: OMEPRAZOLE DR 40 MG CAPSULE] 30 capsule 0     Sig: TAKE 1 CAPSULE BY MOUTH ONCE DAILY    metFORMIN (GLUCOPHAGE) 1000 MG tablet [Pharmacy Med Name: METFORMIN HCL 1,000 MG TABLET] 60 tablet 0     Sig: TAKE 1 TABLET BY MOUTH TWICE DAILY WITH MEALS      Last office visit with prescribing clinician: 10/23/2023   Last telemedicine visit with prescribing clinician: Visit date not found   Next office visit with prescribing clinician: 4/26/2024                         Would you like a call back once the refill request has been completed: [] Yes [] No    If the office needs to give you a call back, can they leave a voicemail: [] Yes [] No    Camilo Cruz MA  12/21/23, 11:47 CST

## 2024-02-15 DIAGNOSIS — Z56.6 STRESS AT WORK: ICD-10-CM

## 2024-02-15 DIAGNOSIS — K21.9 GASTROESOPHAGEAL REFLUX DISEASE, UNSPECIFIED WHETHER ESOPHAGITIS PRESENT: ICD-10-CM

## 2024-02-15 DIAGNOSIS — E11.9 TYPE 2 DIABETES MELLITUS WITHOUT COMPLICATION, WITHOUT LONG-TERM CURRENT USE OF INSULIN: ICD-10-CM

## 2024-02-15 SDOH — HEALTH STABILITY - MENTAL HEALTH: OTHER PHYSICAL AND MENTAL STRAIN RELATED TO WORK: Z56.6

## 2024-02-16 RX ORDER — FLUOXETINE HYDROCHLORIDE 40 MG/1
40 CAPSULE ORAL DAILY
Qty: 30 CAPSULE | Refills: 5 | Status: SHIPPED | OUTPATIENT
Start: 2024-02-16

## 2024-02-16 RX ORDER — OMEPRAZOLE 40 MG/1
40 CAPSULE, DELAYED RELEASE ORAL DAILY
Qty: 30 CAPSULE | Refills: 11 | Status: SHIPPED | OUTPATIENT
Start: 2024-02-16

## 2024-03-18 ENCOUNTER — PATIENT MESSAGE (OUTPATIENT)
Dept: INTERNAL MEDICINE | Facility: CLINIC | Age: 39
End: 2024-03-18
Payer: COMMERCIAL

## 2024-03-19 RX ORDER — SEMAGLUTIDE 1.34 MG/ML
1 INJECTION, SOLUTION SUBCUTANEOUS WEEKLY
COMMUNITY
Start: 2024-02-16 | End: 2024-03-19 | Stop reason: SDUPTHER

## 2024-03-19 RX ORDER — SEMAGLUTIDE 1.34 MG/ML
1 INJECTION, SOLUTION SUBCUTANEOUS WEEKLY
Qty: 3 ML | Refills: 5 | Status: SHIPPED | OUTPATIENT
Start: 2024-03-19

## 2024-03-19 NOTE — TELEPHONE ENCOUNTER
Rx Refill Note  Requested Prescriptions     Pending Prescriptions Disp Refills    Ozempic, 1 MG/DOSE, 4 MG/3ML solution pen-injector       Sig: Inject 1 mg under the skin into the appropriate area as directed 1 (One) Time Per Week.      Last office visit with prescribing clinician: 10/23/2023   Last telemedicine visit with prescribing clinician: Visit date not found   Next office visit with prescribing clinician: 4/26/2024                         Would you like a call back once the refill request has been completed: [] Yes [] No    If the office needs to give you a call back, can they leave a voicemail: [] Yes [] No    Megan Borjas, ANAYA  03/19/24, 14:14 CDT

## 2024-04-08 RX ORDER — LOSARTAN POTASSIUM 100 MG/1
100 TABLET ORAL DAILY
Qty: 30 TABLET | Refills: 0 | Status: SHIPPED | OUTPATIENT
Start: 2024-04-08

## 2024-04-08 NOTE — TELEPHONE ENCOUNTER
Rx Refill Note  Requested Prescriptions     Pending Prescriptions Disp Refills    losartan (COZAAR) 100 MG tablet 30 tablet 5     Sig: Take 1 tablet by mouth Daily.      Last office visit with prescribing clinician: 10/23/2023   Last telemedicine visit with prescribing clinician: Visit date not found   Next office visit with prescribing clinician: 4/26/2024                         Would you like a call back once the refill request has been completed: [] Yes [] No    If the office needs to give you a call back, can they leave a voicemail: [] Yes [] No    Camilo Cruz MA  04/08/24, 09:46 CDT

## 2024-04-24 ENCOUNTER — LAB (OUTPATIENT)
Dept: LAB | Facility: HOSPITAL | Age: 39
End: 2024-04-24
Payer: COMMERCIAL

## 2024-04-24 DIAGNOSIS — I10 ESSENTIAL HYPERTENSION: ICD-10-CM

## 2024-04-24 DIAGNOSIS — Z00.00 HEALTHCARE MAINTENANCE: ICD-10-CM

## 2024-04-24 DIAGNOSIS — E78.1 HYPERTRIGLYCERIDEMIA: ICD-10-CM

## 2024-04-24 DIAGNOSIS — E11.9 TYPE 2 DIABETES MELLITUS WITHOUT COMPLICATION, WITHOUT LONG-TERM CURRENT USE OF INSULIN: ICD-10-CM

## 2024-04-24 LAB
ALBUMIN SERPL-MCNC: 4.3 G/DL (ref 3.5–5.2)
ALBUMIN UR-MCNC: 2 MG/DL
ALBUMIN/GLOB SERPL: 1.4 G/DL
ALP SERPL-CCNC: 96 U/L (ref 39–117)
ALT SERPL W P-5'-P-CCNC: 52 U/L (ref 1–41)
ANION GAP SERPL CALCULATED.3IONS-SCNC: 11 MMOL/L (ref 5–15)
AST SERPL-CCNC: 31 U/L (ref 1–40)
BILIRUB SERPL-MCNC: 0.4 MG/DL (ref 0–1.2)
BUN SERPL-MCNC: 12 MG/DL (ref 6–20)
BUN/CREAT SERPL: 17.6 (ref 7–25)
CALCIUM SPEC-SCNC: 9.5 MG/DL (ref 8.6–10.5)
CHLORIDE SERPL-SCNC: 100 MMOL/L (ref 98–107)
CHOLEST SERPL-MCNC: 171 MG/DL (ref 0–200)
CO2 SERPL-SCNC: 26 MMOL/L (ref 22–29)
CREAT SERPL-MCNC: 0.68 MG/DL (ref 0.76–1.27)
CREAT UR-MCNC: 174.4 MG/DL
DEPRECATED RDW RBC AUTO: 39.4 FL (ref 37–54)
EGFRCR SERPLBLD CKD-EPI 2021: 120.5 ML/MIN/1.73
ERYTHROCYTE [DISTWIDTH] IN BLOOD BY AUTOMATED COUNT: 13 % (ref 12.3–15.4)
GLOBULIN UR ELPH-MCNC: 3 GM/DL
GLUCOSE SERPL-MCNC: 153 MG/DL (ref 65–99)
HBA1C MFR BLD: 7.3 % (ref 4.8–5.6)
HCT VFR BLD AUTO: 42.1 % (ref 37.5–51)
HDLC SERPL-MCNC: 38 MG/DL (ref 40–60)
HGB BLD-MCNC: 14.4 G/DL (ref 13–17.7)
LDLC SERPL CALC-MCNC: 71 MG/DL (ref 0–100)
LDLC/HDLC SERPL: 1.42 {RATIO}
MCH RBC QN AUTO: 28.6 PG (ref 26.6–33)
MCHC RBC AUTO-ENTMCNC: 34.2 G/DL (ref 31.5–35.7)
MCV RBC AUTO: 83.7 FL (ref 79–97)
MICROALBUMIN/CREAT UR: 11.5 MG/G (ref 0–29)
PLATELET # BLD AUTO: 275 10*3/MM3 (ref 140–450)
PMV BLD AUTO: 10.9 FL (ref 6–12)
POTASSIUM SERPL-SCNC: 4.3 MMOL/L (ref 3.5–5.2)
PROT SERPL-MCNC: 7.3 G/DL (ref 6–8.5)
RBC # BLD AUTO: 5.03 10*6/MM3 (ref 4.14–5.8)
SODIUM SERPL-SCNC: 137 MMOL/L (ref 136–145)
TRIGL SERPL-MCNC: 395 MG/DL (ref 0–150)
VLDLC SERPL-MCNC: 62 MG/DL (ref 5–40)
WBC NRBC COR # BLD AUTO: 7.47 10*3/MM3 (ref 3.4–10.8)

## 2024-04-24 PROCEDURE — 85027 COMPLETE CBC AUTOMATED: CPT

## 2024-04-24 PROCEDURE — 36415 COLL VENOUS BLD VENIPUNCTURE: CPT

## 2024-04-24 PROCEDURE — 80061 LIPID PANEL: CPT

## 2024-04-24 PROCEDURE — 82043 UR ALBUMIN QUANTITATIVE: CPT

## 2024-04-24 PROCEDURE — 80053 COMPREHEN METABOLIC PANEL: CPT

## 2024-04-24 PROCEDURE — 83036 HEMOGLOBIN GLYCOSYLATED A1C: CPT

## 2024-04-24 PROCEDURE — 82570 ASSAY OF URINE CREATININE: CPT

## 2024-04-26 ENCOUNTER — OFFICE VISIT (OUTPATIENT)
Dept: INTERNAL MEDICINE | Facility: CLINIC | Age: 39
End: 2024-04-26
Payer: COMMERCIAL

## 2024-04-26 VITALS
WEIGHT: 307.5 LBS | HEIGHT: 72 IN | OXYGEN SATURATION: 97 % | HEART RATE: 86 BPM | TEMPERATURE: 97.4 F | DIASTOLIC BLOOD PRESSURE: 80 MMHG | BODY MASS INDEX: 41.65 KG/M2 | SYSTOLIC BLOOD PRESSURE: 122 MMHG

## 2024-04-26 DIAGNOSIS — E78.1 HYPERTRIGLYCERIDEMIA: ICD-10-CM

## 2024-04-26 DIAGNOSIS — Z00.00 ENCOUNTER FOR PREVENTIVE CARE: Primary | ICD-10-CM

## 2024-04-26 DIAGNOSIS — E11.65 TYPE 2 DIABETES MELLITUS WITH HYPERGLYCEMIA, WITHOUT LONG-TERM CURRENT USE OF INSULIN: ICD-10-CM

## 2024-04-26 DIAGNOSIS — E66.01 CLASS 3 SEVERE OBESITY DUE TO EXCESS CALORIES WITH SERIOUS COMORBIDITY AND BODY MASS INDEX (BMI) OF 40.0 TO 44.9 IN ADULT: ICD-10-CM

## 2024-04-26 DIAGNOSIS — Z13.31 DEPRESSION SCREEN: ICD-10-CM

## 2024-04-26 DIAGNOSIS — I10 ESSENTIAL HYPERTENSION: ICD-10-CM

## 2024-04-26 RX ORDER — CALCIUM CITRATE/VITAMIN D3 200MG-6.25
TABLET ORAL
Qty: 100 EACH | Refills: 0 | Status: SHIPPED | OUTPATIENT
Start: 2024-04-26

## 2024-04-26 RX ORDER — SEMAGLUTIDE 2.68 MG/ML
2 INJECTION, SOLUTION SUBCUTANEOUS WEEKLY
Qty: 3 ML | Refills: 5 | Status: SHIPPED | OUTPATIENT
Start: 2024-04-26

## 2024-04-26 NOTE — PROGRESS NOTES
Chief Complaint   Patient presents with    Annual Exam         History:  Gagandeep Ordoñez is a 38 y.o. male who presents today for evaluation of the above problems.      Vincent presents today for his annual physical.  He denies new issues or complaints.    Reviewed blood work from April 24, 2024 with him today.  Labs looked pretty good overall.  His A1c was 7.3%.  Total cholesterol 171 with HDL 38, LDL of 71.  Triglycerides were 395.  He was fasting.    He had a dilated eye exam on February 20, 2024.    He denies any tobacco use, recreational drug use or alcohol use.    He is trying to diet and exercise is much as he can but he has been very busy between work and his home life.    HPI    Social History     Socioeconomic History    Marital status:    Tobacco Use    Smoking status: Never    Smokeless tobacco: Former   Vaping Use    Vaping status: Never Used   Substance and Sexual Activity    Alcohol use: Yes     Comment: Rarely    Drug use: No    Sexual activity: Yes     Partners: Female       PHQ-9 Depression Screening  Little interest or pleasure in doing things? 0-->not at all   Feeling down, depressed, or hopeless? 0-->not at all   Trouble falling or staying asleep, or sleeping too much?     Feeling tired or having little energy?     Poor appetite or overeating?     Feeling bad about yourself - or that you are a failure or have let yourself or your family down?     Trouble concentrating on things, such as reading the newspaper or watching television?     Moving or speaking so slowly that other people could have noticed? Or the opposite - being so fidgety or restless that you have been moving around a lot more than usual?     Thoughts that you would be better off dead, or of hurting yourself in some way?     PHQ-9 Total Score 0   If you checked off any problems, how difficult have these problems made it for you to do your work, take care of things at home, or get along with other people?         PHQ-9 Total Score:  0    ROS:  Review of Systems   Constitutional:  Negative for activity change, appetite change, fatigue, fever and unexpected weight change.   HENT: Negative.     Eyes: Negative.    Respiratory:  Negative for cough, chest tightness and shortness of breath.    Cardiovascular:  Negative for chest pain, palpitations and leg swelling.   Gastrointestinal:  Negative for abdominal pain, constipation, diarrhea, nausea and vomiting.   Endocrine: Negative for cold intolerance and heat intolerance.   Genitourinary: Negative.    Musculoskeletal: Negative.  Negative for back pain, neck pain and neck stiffness.   Skin:  Negative for rash and wound.   Neurological:  Negative for dizziness, syncope, weakness, light-headedness and headaches.   Hematological:  Negative for adenopathy. Does not bruise/bleed easily.   Psychiatric/Behavioral:  Negative for agitation, behavioral problems and confusion.          Current Outpatient Medications:     amLODIPine (NORVASC) 10 MG tablet, Take 1 tablet by mouth Daily., Disp: 30 tablet, Rfl: 11    diphenoxylate-atropine (LOMOTIL) 2.5-0.025 MG per tablet, Take 1 tablet by mouth As Needed., Disp: , Rfl:     FLUoxetine (PROzac) 40 MG capsule, Take 1 capsule by mouth Daily., Disp: 30 capsule, Rfl: 5    glucose blood (True Metrix Blood Glucose Test) test strip, Test glucose once daily PRN, Disp: 100 each, Rfl: 0    hydroCHLOROthiazide (HYDRODIURIL) 50 MG tablet, Take 1 tablet by mouth Daily., Disp: 30 tablet, Rfl: 11    losartan (COZAAR) 100 MG tablet, Take 1 tablet by mouth Daily., Disp: 30 tablet, Rfl: 0    metFORMIN (GLUCOPHAGE) 1000 MG tablet, Take 1 tablet by mouth 2 (Two) Times a Day With Meals., Disp: 60 tablet, Rfl: 11    omeprazole (priLOSEC) 40 MG capsule, Take 1 capsule by mouth Daily., Disp: 30 capsule, Rfl: 11    Semaglutide, 2 MG/DOSE, (Ozempic, 2 MG/DOSE,) 8 MG/3ML solution pen-injector, Inject 2 mg under the skin into the appropriate area as directed 1 (One) Time Per Week., Disp: 3 mL,  Rfl: 5    Lab Results   Component Value Date    GLUCOSE 153 (H) 04/24/2024    BUN 12 04/24/2024    CREATININE 0.68 (L) 04/24/2024    EGFR 120.5 04/24/2024    BCR 17.6 04/24/2024    K 4.3 04/24/2024    CO2 26.0 04/24/2024    CALCIUM 9.5 04/24/2024    ALBUMIN 4.3 04/24/2024    BILITOT 0.4 04/24/2024    AST 31 04/24/2024    ALT 52 (H) 04/24/2024       WBC   Date Value Ref Range Status   04/24/2024 7.47 3.40 - 10.80 10*3/mm3 Final   02/20/2019 10.3 4.8 - 10.8 K/uL Final     RBC   Date Value Ref Range Status   04/24/2024 5.03 4.14 - 5.80 10*6/mm3 Final   02/20/2019 5.25 4.70 - 6.10 M/uL Final     Hemoglobin   Date Value Ref Range Status   04/24/2024 14.4 13.0 - 17.7 g/dL Final   02/20/2019 15.1 14.0 - 18.0 g/dL Final     Hematocrit   Date Value Ref Range Status   04/24/2024 42.1 37.5 - 51.0 % Final   02/20/2019 45.6 42.0 - 52.0 % Final     MCV   Date Value Ref Range Status   04/24/2024 83.7 79.0 - 97.0 fL Final   02/20/2019 86.9 80.0 - 94.0 fL Final     MCH   Date Value Ref Range Status   04/24/2024 28.6 26.6 - 33.0 pg Final   02/20/2019 28.8 27.0 - 31.0 pg Final     MCHC   Date Value Ref Range Status   04/24/2024 34.2 31.5 - 35.7 g/dL Final   02/20/2019 33.1 33.0 - 37.0 g/dL Final     RDW   Date Value Ref Range Status   04/24/2024 13.0 12.3 - 15.4 % Final   02/20/2019 12.9 11.5 - 14.5 % Final     RDW-SD   Date Value Ref Range Status   04/24/2024 39.4 37.0 - 54.0 fl Final     MPV   Date Value Ref Range Status   04/24/2024 10.9 6.0 - 12.0 fL Final   02/20/2019 11.2 9.4 - 12.4 fL Final     Platelets   Date Value Ref Range Status   04/24/2024 275 140 - 450 10*3/mm3 Final   02/20/2019 292 130 - 400 K/uL Final     Neutrophil Rel %   Date Value Ref Range Status   02/20/2019 56.4 50.0 - 65.0 % Final     Neutrophil %   Date Value Ref Range Status   08/31/2021 75.6 42.7 - 76.0 % Final     Lymphocyte Rel %   Date Value Ref Range Status   02/20/2019 33.2 20.0 - 40.0 % Final     Lymphocyte %   Date Value Ref Range Status    08/31/2021 16.1 (L) 19.6 - 45.3 % Final     Monocyte Rel %   Date Value Ref Range Status   02/20/2019 7.9 0.0 - 10.0 % Final     Monocyte %   Date Value Ref Range Status   08/31/2021 5.3 5.0 - 12.0 % Final     Eosinophil Rel %   Date Value Ref Range Status   02/20/2019 1.4 0.0 - 5.0 % Final     Eosinophil %   Date Value Ref Range Status   08/31/2021 0.9 0.3 - 6.2 % Final     Basophil Rel %   Date Value Ref Range Status   02/20/2019 0.7 0.0 - 1.0 % Final     Basophil %   Date Value Ref Range Status   08/31/2021 0.5 0.0 - 1.5 % Final     Immature Grans %   Date Value Ref Range Status   08/31/2021 1.6 (H) 0.0 - 0.5 % Final     Neutrophils Absolute   Date Value Ref Range Status   02/20/2019 5.8 1.5 - 7.5 K/uL Final     Neutrophils, Absolute   Date Value Ref Range Status   08/31/2021 10.23 (H) 1.70 - 7.00 10*3/mm3 Final     Lymphocytes Absolute   Date Value Ref Range Status   02/20/2019 3.4 1.1 - 4.5 K/uL Final     Lymphocytes, Absolute   Date Value Ref Range Status   08/31/2021 2.18 0.70 - 3.10 10*3/mm3 Final     Monocytes Absolute   Date Value Ref Range Status   02/20/2019 0.80 0.00 - 0.90 K/uL Final     Monocytes, Absolute   Date Value Ref Range Status   08/31/2021 0.71 0.10 - 0.90 10*3/mm3 Final     Eosinophils Absolute   Date Value Ref Range Status   02/20/2019 0.10 0.00 - 0.60 K/uL Final     Eosinophils, Absolute   Date Value Ref Range Status   08/31/2021 0.12 0.00 - 0.40 10*3/mm3 Final     Basophils Absolute   Date Value Ref Range Status   02/20/2019 0.10 0.00 - 0.20 K/uL Final     Basophils, Absolute   Date Value Ref Range Status   08/31/2021 0.07 0.00 - 0.20 10*3/mm3 Final     Immature Grans, Absolute   Date Value Ref Range Status   08/31/2021 0.21 (H) 0.00 - 0.05 10*3/mm3 Final     [facility]   Date Value Ref Range Status   08/31/2021 0.0 0.0 - 0.2 /100 WBC Final         OBJECTIVE:  Visit Vitals  /80 (BP Location: Left arm, Patient Position: Sitting, Cuff Size: Adult)   Pulse 86   Temp 97.4 °F (36.3 °C)  "(Temporal)   Ht 182.9 cm (72\")   Wt (!) 139 kg (307 lb 8 oz)   SpO2 97%   BMI 41.70 kg/m²      Physical Exam  Vitals and nursing note reviewed.   Constitutional:       General: He is not in acute distress.     Appearance: Normal appearance. He is well-developed. He is obese. He is not ill-appearing.      Comments: Pleasant   HENT:      Head: Normocephalic and atraumatic.      Right Ear: Tympanic membrane, ear canal and external ear normal. There is no impacted cerumen.      Left Ear: Tympanic membrane, ear canal and external ear normal. There is no impacted cerumen.      Mouth/Throat:      Pharynx: No oropharyngeal exudate.   Eyes:      General: No scleral icterus.     Conjunctiva/sclera: Conjunctivae normal.      Pupils: Pupils are equal, round, and reactive to light.   Neck:      Thyroid: No thyromegaly.      Vascular: No JVD.   Cardiovascular:      Rate and Rhythm: Normal rate and regular rhythm.      Heart sounds: Normal heart sounds. No murmur heard.     No friction rub. No gallop.   Pulmonary:      Effort: Pulmonary effort is normal. No respiratory distress.      Breath sounds: Normal breath sounds. No stridor. No wheezing, rhonchi or rales.   Abdominal:      General: Bowel sounds are normal. There is no distension.      Palpations: Abdomen is soft.      Tenderness: There is no abdominal tenderness.   Musculoskeletal:      Right lower leg: No edema.      Left lower leg: No edema.   Skin:     General: Skin is warm and dry.      Coloration: Skin is not jaundiced.   Neurological:      Mental Status: He is alert.      Cranial Nerves: No cranial nerve deficit.   Psychiatric:         Mood and Affect: Mood normal.         Behavior: Behavior normal.         Thought Content: Thought content normal.         Judgment: Judgment normal.         Assessment/Plan    Diagnoses and all orders for this visit:    1. Encounter for preventive care (Primary)    2. Depression screen    3. Essential hypertension    4. Type 2 diabetes " mellitus with hyperglycemia, without long-term current use of insulin  -     glucose blood (True Metrix Blood Glucose Test) test strip; Test glucose once daily PRN  Dispense: 100 each; Refill: 0  -     Semaglutide, 2 MG/DOSE, (Ozempic, 2 MG/DOSE,) 8 MG/3ML solution pen-injector; Inject 2 mg under the skin into the appropriate area as directed 1 (One) Time Per Week.  Dispense: 3 mL; Refill: 5    5. Class 3 severe obesity due to excess calories with serious comorbidity and body mass index (BMI) of 40.0 to 44.9 in adult    6. Hypertriglyceridemia      Overall, Vincent is a doing well.  His depression screen is negative.  Blood pressure is well-controlled.    Overall, Vincent' diabetes is under stable control but I would like his A1c to be improved.  I will increase his Ozempic to 2 mg weekly.    Keep focusing on diet and exercise to help improve his weight and triglycerides.    Counseling/Anticipatory Guidance Discussed: nutrition, physical activity, healthy weight, misuse of tobacco, alcohol and drugs, dental health, mental health, immunizations, and screenings     Class 3 Severe Obesity (BMI >=40). Obesity-related health conditions include the following: hypertension and diabetes mellitus. Obesity is unchanged. BMI is is above average; BMI management plan is completed. We discussed portion control and increasing exercise.    Return in about 6 months (around 10/26/2024) for Recheck A1c.    Patient was given instructions and counseling regarding his/her condition or for health maintenance advice. Please see specific information pulled into the AVS if appropriate.     CHARMAINE Sanchez MD  15:25 CDT  4/26/2024  Electronically signed

## 2024-05-08 RX ORDER — LOSARTAN POTASSIUM 100 MG/1
100 TABLET ORAL DAILY
Qty: 30 TABLET | Refills: 5 | Status: SHIPPED | OUTPATIENT
Start: 2024-05-08

## 2024-08-01 DIAGNOSIS — Z56.6 STRESS AT WORK: ICD-10-CM

## 2024-08-01 RX ORDER — FLUOXETINE HYDROCHLORIDE 40 MG/1
40 CAPSULE ORAL DAILY
Qty: 30 CAPSULE | Refills: 3 | Status: SHIPPED | OUTPATIENT
Start: 2024-08-01

## 2024-08-01 SDOH — HEALTH STABILITY - MENTAL HEALTH: OTHER PHYSICAL AND MENTAL STRAIN RELATED TO WORK: Z56.6

## 2024-08-01 NOTE — TELEPHONE ENCOUNTER
Rx Refill Note  Requested Prescriptions     Pending Prescriptions Disp Refills    FLUoxetine (PROzac) 40 MG capsule [Pharmacy Med Name: FLUOXETINE HCL 40 MG CAPSULE] 30 capsule 0     Sig: TAKE 1 CAPSULE BY MOUTH ONCE DAILY      Last office visit with prescribing clinician: 4/26/2024   Last telemedicine visit with prescribing clinician: Visit date not found   Next office visit with prescribing clinician: 10/28/2024                         Would you like a call back once the refill request has been completed: [] Yes [] No    If the office needs to give you a call back, can they leave a voicemail: [] Yes [] No    Camilo Cruz MA  08/01/24, 16:45 CDT

## 2024-10-24 RX ORDER — FLUOXETINE 40 MG/1
40 CAPSULE ORAL DAILY
COMMUNITY
Start: 2024-08-01

## 2024-10-24 RX ORDER — SEMAGLUTIDE 2.68 MG/ML
2 INJECTION, SOLUTION SUBCUTANEOUS WEEKLY
COMMUNITY
Start: 2024-04-26

## 2024-10-24 RX ORDER — OMEPRAZOLE 40 MG/1
40 CAPSULE, DELAYED RELEASE ORAL DAILY
COMMUNITY
Start: 2024-02-16

## 2024-10-25 DIAGNOSIS — E11.65 TYPE 2 DIABETES MELLITUS WITH HYPERGLYCEMIA, WITHOUT LONG-TERM CURRENT USE OF INSULIN: ICD-10-CM

## 2024-10-25 RX ORDER — LOSARTAN POTASSIUM 100 MG/1
100 TABLET ORAL DAILY
Qty: 30 TABLET | Refills: 11 | Status: SHIPPED | OUTPATIENT
Start: 2024-10-25

## 2024-10-25 RX ORDER — SEMAGLUTIDE 2.68 MG/ML
2 INJECTION, SOLUTION SUBCUTANEOUS WEEKLY
Qty: 3 ML | Refills: 5 | Status: SHIPPED | OUTPATIENT
Start: 2024-10-25

## 2024-10-25 NOTE — TELEPHONE ENCOUNTER
Rx Refill Note  Requested Prescriptions     Pending Prescriptions Disp Refills    Ozempic, 2 MG/DOSE, 8 MG/3ML solution pen-injector [Pharmacy Med Name: OZEMPIC 2 MG/DOSE (8 MG/3 ML)] 3 mL 0     Sig: INJECT 2 MG SUBCUTANEOUSLY ONCE PER WEEK    losartan (COZAAR) 100 MG tablet [Pharmacy Med Name: LOSARTAN POTASSIUM 100 MG TAB] 30 tablet 0     Sig: TAKE 1 TABLET BY MOUTH DAILY.      Last office visit with prescribing clinician: 4/26/2024   Last telemedicine visit with prescribing clinician: Visit date not found   Next office visit with prescribing clinician: 10/28/2024                         Would you like a call back once the refill request has been completed: [] Yes [] No    If the office needs to give you a call back, can they leave a voicemail: [] Yes [] No    Camilo Cruz MA  10/25/24, 12:28 CDT

## 2024-10-28 ENCOUNTER — OFFICE VISIT (OUTPATIENT)
Dept: INTERNAL MEDICINE | Facility: CLINIC | Age: 39
End: 2024-10-28
Payer: COMMERCIAL

## 2024-10-28 VITALS
DIASTOLIC BLOOD PRESSURE: 80 MMHG | SYSTOLIC BLOOD PRESSURE: 120 MMHG | WEIGHT: 285.4 LBS | TEMPERATURE: 97.9 F | HEART RATE: 83 BPM | HEIGHT: 72 IN | BODY MASS INDEX: 38.66 KG/M2 | OXYGEN SATURATION: 98 %

## 2024-10-28 DIAGNOSIS — E11.9 TYPE 2 DIABETES MELLITUS WITHOUT COMPLICATION, WITHOUT LONG-TERM CURRENT USE OF INSULIN: Primary | ICD-10-CM

## 2024-10-28 DIAGNOSIS — Z56.6 STRESS AT WORK: ICD-10-CM

## 2024-10-28 DIAGNOSIS — E66.01 CLASS 2 SEVERE OBESITY DUE TO EXCESS CALORIES WITH SERIOUS COMORBIDITY AND BODY MASS INDEX (BMI) OF 38.0 TO 38.9 IN ADULT: ICD-10-CM

## 2024-10-28 DIAGNOSIS — I10 ESSENTIAL HYPERTENSION: ICD-10-CM

## 2024-10-28 DIAGNOSIS — E66.812 CLASS 2 SEVERE OBESITY DUE TO EXCESS CALORIES WITH SERIOUS COMORBIDITY AND BODY MASS INDEX (BMI) OF 38.0 TO 38.9 IN ADULT: ICD-10-CM

## 2024-10-28 PROBLEM — E66.09 CLASS 2 OBESITY DUE TO EXCESS CALORIES WITH BODY MASS INDEX (BMI) OF 38.0 TO 38.9 IN ADULT: Status: ACTIVE | Noted: 2019-03-21

## 2024-10-28 LAB
EXPIRATION DATE: ABNORMAL
HBA1C MFR BLD: 6.4 % (ref 4.5–5.7)
Lab: ABNORMAL

## 2024-10-28 PROCEDURE — 99214 OFFICE O/P EST MOD 30 MIN: CPT | Performed by: INTERNAL MEDICINE

## 2024-10-28 PROCEDURE — 83036 HEMOGLOBIN GLYCOSYLATED A1C: CPT | Performed by: INTERNAL MEDICINE

## 2024-10-28 RX ORDER — AMLODIPINE BESYLATE 10 MG/1
10 TABLET ORAL DAILY
Qty: 30 TABLET | Refills: 11 | Status: SHIPPED | OUTPATIENT
Start: 2024-10-28

## 2024-10-28 RX ORDER — MELOXICAM 15 MG/1
TABLET ORAL
COMMUNITY
Start: 2024-08-29

## 2024-10-28 RX ORDER — HYDROCHLOROTHIAZIDE 50 MG/1
50 TABLET ORAL DAILY
Qty: 30 TABLET | Refills: 11 | Status: SHIPPED | OUTPATIENT
Start: 2024-10-28

## 2024-10-28 RX ORDER — FLUOXETINE 40 MG/1
40 CAPSULE ORAL DAILY
Qty: 30 CAPSULE | Refills: 11 | Status: SHIPPED | OUTPATIENT
Start: 2024-10-28

## 2024-10-28 SDOH — HEALTH STABILITY - MENTAL HEALTH: OTHER PHYSICAL AND MENTAL STRAIN RELATED TO WORK: Z56.6

## 2024-10-28 NOTE — PROGRESS NOTES
Chief Complaint   Patient presents with    Follow-up     6 month    Diabetes         History:  Gagandeep Ordoñez is a 38 y.o. male who presents today for evaluation of the above problems.      HPI  History of Present Illness  The patient presents for a 6-month follow-up on his diabetes.    He reports no current issues or complaints. His home blood sugar readings have been consistently around 120 to 130. He is currently on metformin 1000 mg twice daily and Ozempic 2 mg weekly, both of which he tolerates well without any side effects.    He has incorporated daily walks into his routine, including during lunch breaks and on weekends. On Fridays, he participates in football, achieving approximately 20,000 steps. His goal is to maintain a minimum of 10,000 steps daily.    He also notes that Prozac is effectively managing his symptoms.    His A1c has improved to 6.4% today.  This is the lowest it has been in the last 5 years.      ROS:  Review of Systems  As above    Current Outpatient Medications:     amLODIPine (NORVASC) 10 MG tablet, Take 1 tablet by mouth Daily., Disp: 30 tablet, Rfl: 11    diphenoxylate-atropine (LOMOTIL) 2.5-0.025 MG per tablet, Take 1 tablet by mouth As Needed., Disp: , Rfl:     FLUoxetine (PROzac) 40 MG capsule, Take 1 capsule by mouth Daily., Disp: 30 capsule, Rfl: 11    glucose blood (True Metrix Blood Glucose Test) test strip, Test glucose once daily PRN, Disp: 100 each, Rfl: 0    hydroCHLOROthiazide 50 MG tablet, Take 1 tablet by mouth Daily., Disp: 30 tablet, Rfl: 11    losartan (COZAAR) 100 MG tablet, Take 1 tablet by mouth Daily., Disp: 30 tablet, Rfl: 11    meloxicam (MOBIC) 15 MG tablet, , Disp: , Rfl:     metFORMIN (GLUCOPHAGE) 1000 MG tablet, Take 1 tablet by mouth 2 (Two) Times a Day With Meals., Disp: 60 tablet, Rfl: 11    omeprazole (priLOSEC) 40 MG capsule, Take 1 capsule by mouth Daily., Disp: 30 capsule, Rfl: 11    Semaglutide, 2 MG/DOSE, (Ozempic, 2 MG/DOSE,) 8 MG/3ML solution  pen-injector, Inject 2 mg under the skin into the appropriate area as directed 1 (One) Time Per Week., Disp: 3 mL, Rfl: 5    Lab Results   Component Value Date    GLUCOSE 153 (H) 04/24/2024    BUN 12 04/24/2024    CREATININE 0.68 (L) 04/24/2024     04/24/2024    K 4.3 04/24/2024     04/24/2024    CALCIUM 9.5 04/24/2024    PROTEINTOT 7.3 04/24/2024    ALBUMIN 4.3 04/24/2024    ALT 52 (H) 04/24/2024    AST 31 04/24/2024    ALKPHOS 96 04/24/2024    BILITOT 0.4 04/24/2024    GLOB 3.0 04/24/2024    AGRATIO 1.4 04/24/2024    BCR 17.6 04/24/2024    ANIONGAP 11.0 04/24/2024    EGFR 120.5 04/24/2024       WBC   Date Value Ref Range Status   04/24/2024 7.47 3.40 - 10.80 10*3/mm3 Final   02/20/2019 10.3 4.8 - 10.8 K/uL Final     RBC   Date Value Ref Range Status   04/24/2024 5.03 4.14 - 5.80 10*6/mm3 Final   02/20/2019 5.25 4.70 - 6.10 M/uL Final     Hemoglobin   Date Value Ref Range Status   04/24/2024 14.4 13.0 - 17.7 g/dL Final   02/20/2019 15.1 14.0 - 18.0 g/dL Final     Hematocrit   Date Value Ref Range Status   04/24/2024 42.1 37.5 - 51.0 % Final   02/20/2019 45.6 42.0 - 52.0 % Final     MCV   Date Value Ref Range Status   04/24/2024 83.7 79.0 - 97.0 fL Final   02/20/2019 86.9 80.0 - 94.0 fL Final     MCH   Date Value Ref Range Status   04/24/2024 28.6 26.6 - 33.0 pg Final   02/20/2019 28.8 27.0 - 31.0 pg Final     MCHC   Date Value Ref Range Status   04/24/2024 34.2 31.5 - 35.7 g/dL Final   02/20/2019 33.1 33.0 - 37.0 g/dL Final     RDW   Date Value Ref Range Status   04/24/2024 13.0 12.3 - 15.4 % Final   02/20/2019 12.9 11.5 - 14.5 % Final     RDW-SD   Date Value Ref Range Status   04/24/2024 39.4 37.0 - 54.0 fl Final     MPV   Date Value Ref Range Status   04/24/2024 10.9 6.0 - 12.0 fL Final   02/20/2019 11.2 9.4 - 12.4 fL Final     Platelets   Date Value Ref Range Status   04/24/2024 275 140 - 450 10*3/mm3 Final   02/20/2019 292 130 - 400 K/uL Final     Neutrophil Rel %   Date Value Ref Range Status    02/20/2019 56.4 50.0 - 65.0 % Final     Neutrophil %   Date Value Ref Range Status   08/31/2021 75.6 42.7 - 76.0 % Final     Lymphocyte Rel %   Date Value Ref Range Status   02/20/2019 33.2 20.0 - 40.0 % Final     Lymphocyte %   Date Value Ref Range Status   08/31/2021 16.1 (L) 19.6 - 45.3 % Final     Monocyte Rel %   Date Value Ref Range Status   02/20/2019 7.9 0.0 - 10.0 % Final     Monocyte %   Date Value Ref Range Status   08/31/2021 5.3 5.0 - 12.0 % Final     Eosinophil Rel %   Date Value Ref Range Status   02/20/2019 1.4 0.0 - 5.0 % Final     Eosinophil %   Date Value Ref Range Status   08/31/2021 0.9 0.3 - 6.2 % Final     Basophil Rel %   Date Value Ref Range Status   02/20/2019 0.7 0.0 - 1.0 % Final     Basophil %   Date Value Ref Range Status   08/31/2021 0.5 0.0 - 1.5 % Final     Immature Grans %   Date Value Ref Range Status   08/31/2021 1.6 (H) 0.0 - 0.5 % Final     Neutrophils Absolute   Date Value Ref Range Status   02/20/2019 5.8 1.5 - 7.5 K/uL Final     Neutrophils, Absolute   Date Value Ref Range Status   08/31/2021 10.23 (H) 1.70 - 7.00 10*3/mm3 Final     Lymphocytes Absolute   Date Value Ref Range Status   02/20/2019 3.4 1.1 - 4.5 K/uL Final     Lymphocytes, Absolute   Date Value Ref Range Status   08/31/2021 2.18 0.70 - 3.10 10*3/mm3 Final     Monocytes Absolute   Date Value Ref Range Status   02/20/2019 0.80 0.00 - 0.90 K/uL Final     Monocytes, Absolute   Date Value Ref Range Status   08/31/2021 0.71 0.10 - 0.90 10*3/mm3 Final     Eosinophils Absolute   Date Value Ref Range Status   02/20/2019 0.10 0.00 - 0.60 K/uL Final     Eosinophils, Absolute   Date Value Ref Range Status   08/31/2021 0.12 0.00 - 0.40 10*3/mm3 Final     Basophils Absolute   Date Value Ref Range Status   02/20/2019 0.10 0.00 - 0.20 K/uL Final     Basophils, Absolute   Date Value Ref Range Status   08/31/2021 0.07 0.00 - 0.20 10*3/mm3 Final     Immature Grans, Absolute   Date Value Ref Range Status   08/31/2021 0.21 (H)  "0.00 - 0.05 10*3/mm3 Final     [facility]   Date Value Ref Range Status   08/31/2021 0.0 0.0 - 0.2 /100 WBC Final         OBJECTIVE:  Visit Vitals  /80 (BP Location: Left arm, Patient Position: Sitting, Cuff Size: Adult)   Pulse 83   Temp 97.9 °F (36.6 °C) (Temporal)   Ht 182.9 cm (72\")   Wt 129 kg (285 lb 6.4 oz)   SpO2 98%   BMI 38.71 kg/m²      Physical Exam  Vitals and nursing note reviewed.   Constitutional:       General: He is not in acute distress.     Appearance: Normal appearance. He is well-developed. He is obese. He is not ill-appearing or toxic-appearing.      Comments: Pleasant   HENT:      Head: Normocephalic and atraumatic.   Eyes:      Pupils: Pupils are equal, round, and reactive to light.   Neck:      Thyroid: No thyromegaly.      Trachea: Phonation normal.   Cardiovascular:      Rate and Rhythm: Normal rate.   Pulmonary:      Effort: No respiratory distress.   Skin:     Coloration: Skin is not pale.      Findings: No erythema.   Neurological:      Mental Status: He is alert.   Psychiatric:         Behavior: Behavior normal.         Thought Content: Thought content normal.         Judgment: Judgment normal.       Physical Exam  Vital Signs  Blood pressure is 120/80.    Results  Laboratory Studies  A1c is 6.4%    Assessment/Plan      Diagnoses and all orders for this visit:    1. Type 2 diabetes mellitus without complication, without long-term current use of insulin (Primary)  -     POC Glycosylated Hemoglobin (Hb A1C)    2. Class 2 severe obesity due to excess calories with serious comorbidity and body mass index (BMI) of 38.0 to 38.9 in adult    3. Essential hypertension  -     hydroCHLOROthiazide 50 MG tablet; Take 1 tablet by mouth Daily.  Dispense: 30 tablet; Refill: 11  -     amLODIPine (NORVASC) 10 MG tablet; Take 1 tablet by mouth Daily.  Dispense: 30 tablet; Refill: 11    4. Stress at work  -     FLUoxetine (PROzac) 40 MG capsule; Take 1 capsule by mouth Daily.  Dispense: 30 capsule; " Refill: 11      Assessment & Plan  Diabetes Mellitus.  His A1c has improved from 7.3 to 6.4, indicating better control of his diabetes. He has achieved a weight loss of 22 pounds over the past 6 months. He is currently on metformin 1000 mg twice a day and Ozempic 2 mg every week, both of which he tolerates well without side effects. He is advised to continue his current regimen of walking daily and monitoring his diet. The possibility of reducing his metformin dosage was discussed, contingent upon further weight loss.    2. Hypertension.  His blood pressure is well-controlled at 120/80. Refills for hydrochlorothiazide and amlodipine will be provided.    3. Depression.  He reports no symptoms and is doing well on Prozac. A refill for Prozac will be sent.    He declines influenza vaccine today.    Return in about 6 months (around 4/28/2025) for Annual physical with me.      CHARMAINE Sanchez MD  15:22 CDT  10/28/2024   Electronically signed      Patient or patient representative verbalized consent for the use of Ambient Listening during the visit with  HENRIETTA Sanchez MD for chart documentation. 10/28/2024  16:07 CDT

## 2024-10-31 ENCOUNTER — OFFICE VISIT (OUTPATIENT)
Dept: NEUROLOGY | Age: 39
End: 2024-10-31
Payer: COMMERCIAL

## 2024-10-31 VITALS
WEIGHT: 278 LBS | DIASTOLIC BLOOD PRESSURE: 73 MMHG | HEART RATE: 88 BPM | SYSTOLIC BLOOD PRESSURE: 121 MMHG | BODY MASS INDEX: 37.65 KG/M2 | HEIGHT: 72 IN | OXYGEN SATURATION: 98 %

## 2024-10-31 DIAGNOSIS — G47.33 OBSTRUCTIVE SLEEP APNEA: Primary | ICD-10-CM

## 2024-10-31 DIAGNOSIS — Z76.89 ESTABLISHING CARE WITH NEW DOCTOR, ENCOUNTER FOR: ICD-10-CM

## 2024-10-31 DIAGNOSIS — Z99.89 CPAP (CONTINUOUS POSITIVE AIRWAY PRESSURE) DEPENDENCE: ICD-10-CM

## 2024-10-31 PROCEDURE — 99202 OFFICE O/P NEW SF 15 MIN: CPT | Performed by: PHYSICIAN ASSISTANT

## 2024-10-31 NOTE — PROGRESS NOTES
REVIEW OF SYSTEMS    Constitutional: []Fever []Sweats []Chills [] Recent Injury [x] Denies all unless marked  HEENT:[]Headache  [] Head Injury [] Hearing Loss  [] Sore Throat  [] Ear Ache [x] Denies all unless marked  Spine:  [] Neck pain  [] Back pain  [] Sciaticia  [x] Denies all unless marked  Cardiovascular:[]Heart Disease []Palpitations [] Chest Pain   [x] Denies all unless marked  Pulmonary: []Shortness of Breath []Cough   [x] Denies all unless marked  Psychiatric/Behavioral:[] Depression [] Anxiety [x] Denies all unless marked  Gastrointestinal: []Nausea  []Vomiting  []Abdominal Pain  []Constipation  []Diarrhea  [x] Denies all unless marked  Genitourinary:   [] Frequency  [] Urgency  [] Dysuria [] Incontinence  [x] Denies all unless marked  Extremities: []Pain  []Swelling  [x] Denies all unless marked  Musculoskeletal: [] Myalgias  [] Joint Pain  [] Arthritis [] Muscle Cramps [] Muscle Twitches  [x] Denies all unless marked  Sleep: []Insomnia[]Snoring []Restless Legs  [x]Sleep Apnea  []Daytime Sleepiness  [x] Denies all unless marked  Skin:[] Rash [] Color Change [x] Denies all unless marked   Neurological:[]Visual Disturbance [] Memory Loss []Loss of Balance []Slurred Speech []Weakness []Seizures  [] Dizziness [x] Denies all unless marked     
examination and/or evaluation , counseling and educating the patient/family/caregiver, ordering medications, tests, or procedures, documenting information in the medical record and care coordination

## 2024-10-31 NOTE — PATIENT INSTRUCTIONS
older age adults.  ?Male sex - CAROL is two times more common in men, especially in middle age.  ?Obesity - The more obese a person is, the more likely he or she is to have CAROL.  ?Sedation from medication or alcohol - This interferes with the ability to awaken from sleep and can lengthen periods of apnea (no breathing), with potentially dangerous consequences.  ?Abnormality of the airway.    SLEEP APNEA CONSEQUENCES -- Complications of sleep apnea can include daytime sleepiness and difficulty concentrating. The consequence of this is an increased risk of accidents and errors in daily activities. Studies have shown that people with severe CAROL are more than twice as likely to be involved in a motor vehicle accident as people without these conditions. People with CAROL are encouraged to discuss options for driving, working, and performing other high-risk tasks with a healthcare provider.  In addition, people with untreated CAROL may have an increased risk of cardiovascular problems such as high blood pressure, heart attack, abnormal heart rhythms, or stroke. This risk may be due to changes in the heart rate and blood pressure that occur during sleep.    SLEEP APNEA DIAGNOSIS -- The diagnosis of CAROL is best made by a knowledgeable sleep medicine specialist who has an understanding of the individual's health issues. The diagnosis is usually based upon the person's medical history, physical examination, and testing, including:  ?A complaint of snoring and ineffective sleep  ?Neck size (greater than 16 inches in men or 14 inches in women) is associated with an increased risk of sleep apnea  ?A small upper airway: difficulty seeing the throat because of a tongue that is large for the mouth  ?High blood pressure, especially if it is resistant to treatment  ?If a bed partner has observed the patient during episodes of stopped breathing (apnea), choking, or gasping during sleep, there is a strong possibility of sleep

## 2024-11-14 ENCOUNTER — OFFICE VISIT (OUTPATIENT)
Age: 39
End: 2024-11-14

## 2024-11-14 VITALS — WEIGHT: 290 LBS | BODY MASS INDEX: 39.28 KG/M2 | HEIGHT: 72 IN

## 2024-11-14 DIAGNOSIS — M75.32 CALCIFIC TENDINITIS OF LEFT SHOULDER: Primary | ICD-10-CM

## 2024-11-14 RX ORDER — BETAMETHASONE SODIUM PHOSPHATE AND BETAMETHASONE ACETATE 3; 3 MG/ML; MG/ML
6 INJECTION, SUSPENSION INTRA-ARTICULAR; INTRALESIONAL; INTRAMUSCULAR; SOFT TISSUE ONCE
Status: COMPLETED | OUTPATIENT
Start: 2024-11-14 | End: 2024-11-14

## 2024-11-14 RX ORDER — LIDOCAINE HYDROCHLORIDE 10 MG/ML
1 INJECTION, SOLUTION INFILTRATION; PERINEURAL ONCE
Status: COMPLETED | OUTPATIENT
Start: 2024-11-14 | End: 2024-11-14

## 2024-11-14 RX ORDER — BUPIVACAINE HYDROCHLORIDE 5 MG/ML
3 INJECTION, SOLUTION EPIDURAL; INTRACAUDAL ONCE
Status: COMPLETED | OUTPATIENT
Start: 2024-11-14 | End: 2024-11-14

## 2024-11-14 RX ADMIN — BUPIVACAINE HYDROCHLORIDE 15 MG: 5 INJECTION, SOLUTION EPIDURAL; INTRACAUDAL at 09:48

## 2024-11-14 RX ADMIN — LIDOCAINE HYDROCHLORIDE 1 ML: 10 INJECTION, SOLUTION INFILTRATION; PERINEURAL at 09:49

## 2024-11-14 RX ADMIN — BETAMETHASONE SODIUM PHOSPHATE AND BETAMETHASONE ACETATE 6 MG: 3; 3 INJECTION, SUSPENSION INTRA-ARTICULAR; INTRALESIONAL; INTRAMUSCULAR; SOFT TISSUE at 09:48

## 2024-11-14 NOTE — PROGRESS NOTES
Orthopaedic Clinic Note - Established Patient    NAME:  Jack Jade   : 1985  MRN: [id number]      2024      CHIEF COMPLAINT: Left shoulder pain      HISTORY OF PRESENT ILLNESS:   This is a pleasant 38-year-old gentleman comes in with left shoulder pain.  Patient denies any recent injury or trauma.  Patient with a history of calcific tendinitis.  He is getting ready to go on a trip to [place] for Thanksgiving.  Patient is here discuss further treatment options.    Past Medical History:        Diagnosis Date    CPAP (continuous positive airway pressure) dependence     Diabetes mellitus (HCC)     Hypertension     Sleep apnea        Past Surgical History:        Procedure Laterality Date    HAND SURGERY Left     HERNIA REPAIR         Current Medications:   Prior to Admission medications    Medication Sig Start Date End Date Taking? Authorizing Provider   FLUoxetine (PROZAC) 40 MG capsule Take 1 capsule by mouth daily 24  Yes Janeth Baird MD   omeprazole (PRILOSEC) 40 MG delayed release capsule Take 1 capsule by mouth daily 24  Yes Janeth Baird MD   OZEMPIC, 2 MG/DOSE, 8 MG/3ML SOPN sc injection Inject 2 mg into the skin once a week 24  Yes Janeth Baird MD   metFORMIN (GLUCOPHAGE) 500 MG tablet Take 1 tablet by mouth daily (with breakfast)   Yes Janeth Baird MD   amLODIPine (NORVASC) 5 MG tablet Take 1 tablet by mouth daily   Yes Janeth Baird MD   losartan (COZAAR) 50 MG tablet Take 1 tablet by mouth daily   Yes Janeth Baird MD   hydrochlorothiazide (HYDRODIURIL) 25 MG tablet Take 1 tablet by mouth daily   Yes Janeth Baird MD       Allergies: Patient has no known allergies.    System Neg/Pos Details   Constitutional negative   Fatigue and Fever.   Respiratory negative   Cough and Dyspnea.   Cardio negative   Chest pain.   GI negative   Abdominal pain and Vomiting.    negative   Urinary incontinence.   Neuro negative   Headache.

## 2024-12-16 ENCOUNTER — OFFICE VISIT (OUTPATIENT)
Age: 39
End: 2024-12-16
Payer: COMMERCIAL

## 2024-12-16 VITALS — WEIGHT: 280 LBS | BODY MASS INDEX: 37.11 KG/M2 | HEIGHT: 73 IN

## 2024-12-16 DIAGNOSIS — M75.31 CALCIFIC TENDINITIS OF RIGHT SHOULDER: ICD-10-CM

## 2024-12-16 DIAGNOSIS — M67.911 TENDINOPATHY OF RIGHT ROTATOR CUFF: Primary | ICD-10-CM

## 2024-12-16 PROCEDURE — 99213 OFFICE O/P EST LOW 20 MIN: CPT | Performed by: PHYSICIAN ASSISTANT

## 2024-12-16 PROCEDURE — 20610 DRAIN/INJ JOINT/BURSA W/O US: CPT | Performed by: PHYSICIAN ASSISTANT

## 2024-12-16 RX ORDER — BUPIVACAINE HYDROCHLORIDE 5 MG/ML
3 INJECTION, SOLUTION EPIDURAL; INTRACAUDAL ONCE
Status: COMPLETED | OUTPATIENT
Start: 2024-12-16 | End: 2024-12-16

## 2024-12-16 RX ORDER — BETAMETHASONE SODIUM PHOSPHATE AND BETAMETHASONE ACETATE 3; 3 MG/ML; MG/ML
6 INJECTION, SUSPENSION INTRA-ARTICULAR; INTRALESIONAL; INTRAMUSCULAR; SOFT TISSUE ONCE
Status: COMPLETED | OUTPATIENT
Start: 2024-12-16 | End: 2024-12-16

## 2024-12-16 RX ORDER — LIDOCAINE HYDROCHLORIDE 10 MG/ML
1 INJECTION, SOLUTION INFILTRATION; PERINEURAL ONCE
Status: COMPLETED | OUTPATIENT
Start: 2024-12-16 | End: 2024-12-16

## 2024-12-16 RX ADMIN — BETAMETHASONE SODIUM PHOSPHATE AND BETAMETHASONE ACETATE 6 MG: 3; 3 INJECTION, SUSPENSION INTRA-ARTICULAR; INTRALESIONAL; INTRAMUSCULAR; SOFT TISSUE at 14:26

## 2024-12-16 RX ADMIN — LIDOCAINE HYDROCHLORIDE 1 ML: 10 INJECTION, SOLUTION INFILTRATION; PERINEURAL at 14:28

## 2024-12-16 RX ADMIN — BUPIVACAINE HYDROCHLORIDE 15 MG: 5 INJECTION, SOLUTION EPIDURAL; INTRACAUDAL at 14:27

## 2024-12-16 NOTE — PROGRESS NOTES
Orthopaedic Clinic Note - Established Patient    NAME:  Jack Jade   : 1985  MRN: [id number]      2024      CHIEF COMPLAINT: Right shoulder pain      HISTORY OF PRESENT ILLNESS:   This is a pleasant 39-year-old gentleman who comes in with complaints of right shoulder pain.  Patient denies any recent injury or trauma.  Patient has a history of calcific tendinitis.  Patient is reporting increased pain.  He is here discuss further treatment options.    Past Medical History:        Diagnosis Date    CPAP (continuous positive airway pressure) dependence     Diabetes mellitus (HCC)     Hypertension     Sleep apnea        Past Surgical History:        Procedure Laterality Date    HAND SURGERY Left     HERNIA REPAIR         Current Medications:   Prior to Admission medications    Medication Sig Start Date End Date Taking? Authorizing Provider   FLUoxetine (PROZAC) 40 MG capsule Take 1 capsule by mouth daily 24   Janeth Baird MD   omeprazole (PRILOSEC) 40 MG delayed release capsule Take 1 capsule by mouth daily 24   Janeth Baird MD   OZEMPIC, 2 MG/DOSE, 8 MG/3ML SOPN sc injection Inject 2 mg into the skin once a week 24   Janeth Baird MD   metFORMIN (GLUCOPHAGE) 500 MG tablet Take 1 tablet by mouth daily (with breakfast)    Janeth aBird MD   amLODIPine (NORVASC) 5 MG tablet Take 1 tablet by mouth daily    Janeth Baird MD   losartan (COZAAR) 50 MG tablet Take 1 tablet by mouth daily    Janeth Baird MD   hydrochlorothiazide (HYDRODIURIL) 25 MG tablet Take 1 tablet by mouth daily    Janeth Baird MD       Allergies: Patient has no known allergies.    System Neg/Pos Details   Constitutional negative   Fatigue and Fever.   Respiratory negative   Cough and Dyspnea.   Cardio negative   Chest pain.   GI negative   Abdominal pain and Vomiting.    negative   Urinary incontinence.   Neuro negative   Headache.   Psych negative   Psychiatric

## 2025-02-14 DIAGNOSIS — E11.65 TYPE 2 DIABETES MELLITUS WITH HYPERGLYCEMIA, WITHOUT LONG-TERM CURRENT USE OF INSULIN: ICD-10-CM

## 2025-02-14 RX ORDER — BLOOD SUGAR DIAGNOSTIC
STRIP MISCELLANEOUS
Qty: 200 EACH | Refills: 11 | Status: SHIPPED | OUTPATIENT
Start: 2025-02-14

## 2025-02-14 NOTE — TELEPHONE ENCOUNTER
Rx Refill Note  Requested Prescriptions     Pending Prescriptions Disp Refills    glucose blood (Accu-Chek Nara Plus) test strip [Pharmacy Med Name: ACCU-CHEK NARA PLUS TEST STRP]  0     Sig: TEST BLOOD SUGAR FOUR TIMES DAILY      Last office visit with prescribing clinician: 10/28/2024   Last telemedicine visit with prescribing clinician: Visit date not found   Next office visit with prescribing clinician: 4/29/2025                         Would you like a call back once the refill request has been completed: [] Yes [] No    If the office needs to give you a call back, can they leave a voicemail: [] Yes [] No    Camilo Cruz MA  02/14/25, 13:49 CST

## 2025-03-07 NOTE — PROGRESS NOTES
Caller left a voicemail that she would like to give an update on the patient, his blood pressure was 92/58 sitting; standing- 82/42, please call.   Subjective   Gagandeep Ordoñez is a 33 y.o. male here for nausea and diarrhea.     Nausea   This is a new problem. The current episode started yesterday. The problem occurs constantly. The problem has been gradually improving. Associated symptoms include anorexia, a change in bowel habit (had diarrhea several times yesterday, once today, no blood in stool ), fatigue and nausea. Pertinent negatives include no abdominal pain, arthralgias (had diarrhea several times yesterday, once today), chest pain, chills, congestion, coughing, diaphoresis, fever, headaches, joint swelling, myalgias, rash, sore throat, swollen glands, urinary symptoms, vomiting or weakness. The symptoms are aggravated by eating (is doing ok with liquids, just feels like he has to throw up).       The following portions of the patient's history were reviewed and updated as appropriate: allergies, current medications, past family history, past medical history, past social history, past surgical history and problem list.    Review of Systems   Constitutional: Positive for fatigue. Negative for chills, diaphoresis and fever.   HENT: Negative for congestion, postnasal drip and sore throat.    Eyes: Negative for discharge and redness.   Respiratory: Negative for cough.    Cardiovascular: Negative for chest pain.   Gastrointestinal: Positive for anorexia, change in bowel habit (had diarrhea several times yesterday, once today, no blood in stool ) and nausea. Negative for abdominal pain and vomiting.   Genitourinary: Negative for dysuria.   Musculoskeletal: Negative for arthralgias (had diarrhea several times yesterday, once today), joint swelling and myalgias.   Skin: Negative for rash.   Allergic/Immunologic: Negative for environmental allergies, food allergies and immunocompromised state.   Neurological: Negative for weakness and headaches.   Hematological: Negative for adenopathy.       Objective   Physical Exam   Constitutional: He is oriented to person,  place, and time. He appears well-developed and well-nourished. No distress.   HENT:   Head: Normocephalic and atraumatic.   Right Ear: External ear normal.   Left Ear: External ear normal.   Nose: Nose normal.   Mouth/Throat: Oropharynx is clear and moist. No oropharyngeal exudate.   TM's WNL     Eyes: Conjunctivae and EOM are normal. Pupils are equal, round, and reactive to light. Right eye exhibits no discharge. Left eye exhibits no discharge. No scleral icterus.   Neck: Normal range of motion. Neck supple. No JVD present. No tracheal deviation present. No thyromegaly present.   Cardiovascular: Normal rate, regular rhythm and normal heart sounds. Exam reveals no friction rub.   No murmur heard.  Pulmonary/Chest: Effort normal and breath sounds normal. No stridor. No respiratory distress. He has no wheezes. He has no rales.   Abdominal: Soft. Bowel sounds are normal. He exhibits no distension and no mass. There is no tenderness. There is no rebound and no guarding. No hernia.   No CVA tenderness   Musculoskeletal: Normal range of motion. He exhibits no edema, tenderness or deformity.   Lymphadenopathy:     He has no cervical adenopathy.   Neurological: He is alert and oriented to person, place, and time. He exhibits normal muscle tone. Coordination normal.   Skin: Skin is warm and dry. No rash noted. He is not diaphoretic. No erythema. No pallor.   Psychiatric: He has a normal mood and affect. His behavior is normal. Judgment and thought content normal.   Nursing note and vitals reviewed.      Assessment/Plan   Gagandeep was seen today for nausea.    Diagnoses and all orders for this visit:    Nausea    Acute diarrhea    Other orders  -     ondansetron ODT (ZOFRAN ODT) 8 MG disintegrating tablet; Take 1 tablet by mouth Every 8 (Eight) Hours As Needed for Nausea or Vomiting.       He feels better today, and we reviewed s/sx such as pain/blood/dehydration which would need urgent attention at ED setting.  As long as he  continues to improve, I advise he continue clear liquids and bland diet and let me know if sx persist longer than 2 more days.          Xray Chest 1 View- PORTABLE-Urgent

## 2025-03-17 ENCOUNTER — OFFICE VISIT (OUTPATIENT)
Age: 40
End: 2025-03-17
Payer: COMMERCIAL

## 2025-03-17 DIAGNOSIS — M75.31 CALCIFIC TENDINITIS OF RIGHT SHOULDER: ICD-10-CM

## 2025-03-17 DIAGNOSIS — M67.911 TENDINOPATHY OF RIGHT ROTATOR CUFF: Primary | ICD-10-CM

## 2025-03-17 PROCEDURE — 99212 OFFICE O/P EST SF 10 MIN: CPT | Performed by: PHYSICIAN ASSISTANT

## 2025-03-17 NOTE — PROGRESS NOTES
age, no distress.  Orientation:  Alert and oriented to time, place, and person.  Mood and Affect:  Cooperative and pleasant.    Musculoskeletal:  Range of motion is pretty full today without pain.    Radiology:   No results found.     --------------------------------------------------------------------------------------------------------------------    Assessment:   Calcific tendinitis right shoulder    Plan:    Patient is doing well today we will hold off on injections.  He will continue with activity as tolerated.  Will follow-up as needed.    No follow-ups on file.   No orders of the defined types were placed in this encounter.        Electronically signed by Shivam Adam PA-C on 3/17/2025 at 3:06 PM.    Dragon Disclaimer:   This note was dictated with voice recognition software.  Though review and corrections are routine, we apologize for any errors.

## 2025-04-07 DIAGNOSIS — E11.65 TYPE 2 DIABETES MELLITUS WITH HYPERGLYCEMIA, WITHOUT LONG-TERM CURRENT USE OF INSULIN: ICD-10-CM

## 2025-04-07 RX ORDER — SEMAGLUTIDE 2.68 MG/ML
2 INJECTION, SOLUTION SUBCUTANEOUS WEEKLY
Qty: 3 ML | Refills: 5 | Status: SHIPPED | OUTPATIENT
Start: 2025-04-07

## 2025-04-07 NOTE — TELEPHONE ENCOUNTER
Rx Refill Note  Requested Prescriptions     Pending Prescriptions Disp Refills    Ozempic, 2 MG/DOSE, 8 MG/3ML solution pen-injector [Pharmacy Med Name: OZEMPIC 2 MG/DOSE (8 MG/3 ML)] 3 mL 0     Sig: INJECT 2 MG SUBCUTANEOUSLY ONCE PER WEEK      Last office visit with prescribing clinician: 10/28/2024   Last telemedicine visit with prescribing clinician: Visit date not found   Next office visit with prescribing clinician: 4/29/2025                         Would you like a call back once the refill request has been completed: [] Yes [] No    If the office needs to give you a call back, can they leave a voicemail: [] Yes [] No    Camilo Cruz MA  04/07/25, 13:48 CDT

## 2025-05-02 ENCOUNTER — LAB (OUTPATIENT)
Dept: LAB | Facility: HOSPITAL | Age: 40
End: 2025-05-02
Payer: COMMERCIAL

## 2025-05-02 ENCOUNTER — OFFICE VISIT (OUTPATIENT)
Dept: INTERNAL MEDICINE | Facility: CLINIC | Age: 40
End: 2025-05-02
Payer: COMMERCIAL

## 2025-05-02 VITALS
DIASTOLIC BLOOD PRESSURE: 80 MMHG | HEART RATE: 84 BPM | TEMPERATURE: 98.1 F | SYSTOLIC BLOOD PRESSURE: 130 MMHG | OXYGEN SATURATION: 97 % | HEIGHT: 72 IN | BODY MASS INDEX: 38.01 KG/M2 | WEIGHT: 280.6 LBS

## 2025-05-02 DIAGNOSIS — E11.9 TYPE 2 DIABETES MELLITUS WITHOUT COMPLICATION, WITHOUT LONG-TERM CURRENT USE OF INSULIN: ICD-10-CM

## 2025-05-02 DIAGNOSIS — Z00.00 ENCOUNTER FOR PREVENTIVE CARE: Primary | ICD-10-CM

## 2025-05-02 DIAGNOSIS — Z13.31 DEPRESSION SCREEN: ICD-10-CM

## 2025-05-02 DIAGNOSIS — I10 ESSENTIAL HYPERTENSION: ICD-10-CM

## 2025-05-02 DIAGNOSIS — Z00.00 ENCOUNTER FOR PREVENTIVE CARE: ICD-10-CM

## 2025-05-02 DIAGNOSIS — E66.812 CLASS 2 SEVERE OBESITY DUE TO EXCESS CALORIES WITH SERIOUS COMORBIDITY AND BODY MASS INDEX (BMI) OF 38.0 TO 38.9 IN ADULT: ICD-10-CM

## 2025-05-02 DIAGNOSIS — M26.621 ARTHRALGIA OF RIGHT TEMPOROMANDIBULAR JOINT: ICD-10-CM

## 2025-05-02 DIAGNOSIS — E66.01 CLASS 2 SEVERE OBESITY DUE TO EXCESS CALORIES WITH SERIOUS COMORBIDITY AND BODY MASS INDEX (BMI) OF 38.0 TO 38.9 IN ADULT: ICD-10-CM

## 2025-05-02 LAB
ALBUMIN SERPL-MCNC: 4.4 G/DL (ref 3.5–5.2)
ALBUMIN/GLOB SERPL: 1.5 G/DL
ALP SERPL-CCNC: 101 U/L (ref 39–117)
ALT SERPL W P-5'-P-CCNC: 47 U/L (ref 1–41)
ANION GAP SERPL CALCULATED.3IONS-SCNC: 14 MMOL/L (ref 5–15)
AST SERPL-CCNC: 27 U/L (ref 1–40)
BILIRUB SERPL-MCNC: 0.3 MG/DL (ref 0–1.2)
BUN SERPL-MCNC: 17 MG/DL (ref 6–20)
BUN/CREAT SERPL: 14.7 (ref 7–25)
CALCIUM SPEC-SCNC: 9.2 MG/DL (ref 8.6–10.5)
CHLORIDE SERPL-SCNC: 103 MMOL/L (ref 98–107)
CHOLEST SERPL-MCNC: 161 MG/DL (ref 0–200)
CO2 SERPL-SCNC: 23 MMOL/L (ref 22–29)
CREAT SERPL-MCNC: 1.16 MG/DL (ref 0.76–1.27)
DEPRECATED RDW RBC AUTO: 40.6 FL (ref 37–54)
EGFRCR SERPLBLD CKD-EPI 2021: 81.1 ML/MIN/1.73
ERYTHROCYTE [DISTWIDTH] IN BLOOD BY AUTOMATED COUNT: 13.3 % (ref 12.3–15.4)
GLOBULIN UR ELPH-MCNC: 2.9 GM/DL
GLUCOSE SERPL-MCNC: 134 MG/DL (ref 65–99)
HBA1C MFR BLD: 7.3 % (ref 4.8–5.6)
HCT VFR BLD AUTO: 43.4 % (ref 37.5–51)
HDLC SERPL-MCNC: 40 MG/DL (ref 40–60)
HGB BLD-MCNC: 14.4 G/DL (ref 13–17.7)
LDLC SERPL CALC-MCNC: 69 MG/DL (ref 0–100)
LDLC/HDLC SERPL: 1.39 {RATIO}
MCH RBC QN AUTO: 27.7 PG (ref 26.6–33)
MCHC RBC AUTO-ENTMCNC: 33.2 G/DL (ref 31.5–35.7)
MCV RBC AUTO: 83.6 FL (ref 79–97)
PLATELET # BLD AUTO: 310 10*3/MM3 (ref 140–450)
PMV BLD AUTO: 11.6 FL (ref 6–12)
POTASSIUM SERPL-SCNC: 4 MMOL/L (ref 3.5–5.2)
PROT SERPL-MCNC: 7.3 G/DL (ref 6–8.5)
RBC # BLD AUTO: 5.19 10*6/MM3 (ref 4.14–5.8)
SODIUM SERPL-SCNC: 140 MMOL/L (ref 136–145)
TRIGL SERPL-MCNC: 327 MG/DL (ref 0–150)
VLDLC SERPL-MCNC: 52 MG/DL (ref 5–40)
WBC NRBC COR # BLD AUTO: 10.16 10*3/MM3 (ref 3.4–10.8)

## 2025-05-02 PROCEDURE — 83036 HEMOGLOBIN GLYCOSYLATED A1C: CPT

## 2025-05-02 PROCEDURE — 36415 COLL VENOUS BLD VENIPUNCTURE: CPT

## 2025-05-02 PROCEDURE — 82570 ASSAY OF URINE CREATININE: CPT

## 2025-05-02 PROCEDURE — 82043 UR ALBUMIN QUANTITATIVE: CPT

## 2025-05-02 PROCEDURE — 80061 LIPID PANEL: CPT

## 2025-05-02 PROCEDURE — 80053 COMPREHEN METABOLIC PANEL: CPT

## 2025-05-02 PROCEDURE — 85027 COMPLETE CBC AUTOMATED: CPT

## 2025-05-02 NOTE — PROGRESS NOTES
Chief Complaint   Patient presents with    Annual Exam         History:  Gagandeep Ordoñez is a 39 y.o. male who presents today for evaluation of the above problems.      HPI  History of Present Illness  The patient presents for a physical exam, accompanied by his spouse.    The chief complaint is a burning sensation in the right ear. Initially, this was thought to be related to a dental issue, but the dentist suspects it may be due to bruxism during sleep. A mouthpiece has been recently started to address this condition.    He has adjusted his metformin dosage to 500 mg twice daily due to gastrointestinal side effects.  He is still taking Ozempic 2 mg weekly.  Blood glucose levels have been consistently around 130.    Current medications include hydrochlorothiazide, losartan, and amlodipine for blood pressure management. Morning blood pressure readings are typically around 111/70.    A healthy lifestyle is maintained with regular physical activity, including walking and participating in football. Approximately 5 pounds have been lost since 10/2024.     Alcohol is consumed occasionally, but no tobacco or drug use is reported.     No medication refills are needed at this time.     There are no reports of chest pain, palpitations, respiratory distress, bowel or bladder issues, or depressive symptoms.    SOCIAL HISTORY  He does not use tobacco or drugs. He consumes alcohol occasionally but not very often.    FAMILY HISTORY  His father had multiple strokes in his 40s and 50s due to carotid artery issues. His grandfather had lung cancer.      Social History     Socioeconomic History    Marital status:    Tobacco Use    Smoking status: Never    Smokeless tobacco: Former   Vaping Use    Vaping status: Never Used   Substance and Sexual Activity    Alcohol use: Yes     Comment: Rarely    Drug use: No    Sexual activity: Yes     Partners: Female     Birth control/protection: Condom, Vasectomy       PHQ-9 Depression  Screening  Little interest or pleasure in doing things? Not at all   Feeling down, depressed, or hopeless? Not at all   PHQ-2 Total Score 0   Trouble falling or staying asleep, or sleeping too much?     Feeling tired or having little energy?     Poor appetite or overeating?     Feeling bad about yourself - or that you are a failure or have let yourself or your family down?     Trouble concentrating on things, such as reading the newspaper or watching television?     Moving or speaking so slowly that other people could have noticed? Or the opposite - being so fidgety or restless that you have been moving around a lot more than usual?       Thoughts that you would be better off dead, or of hurting yourself in some way?     PHQ-9 Total Score     If you checked off any problems, how difficult have these problems made it for you to do your work, take care of things at home, or get along with other people? Not difficult at all       PHQ-9 Total Score:        ROS:  Review of Systems   Constitutional:  Negative for activity change, appetite change, fatigue, fever and unexpected weight change.   HENT:  Negative for nosebleeds, sore throat and trouble swallowing.    Eyes:  Negative for pain and visual disturbance.   Respiratory:  Negative for cough, chest tightness and shortness of breath.    Cardiovascular:  Negative for chest pain, palpitations and leg swelling.   Gastrointestinal:  Negative for abdominal pain, constipation, diarrhea, nausea and vomiting.   Endocrine: Negative for cold intolerance and heat intolerance.   Genitourinary:  Negative for hematuria.   Musculoskeletal:  Positive for arthralgias (Right TMJ). Negative for back pain, neck pain and neck stiffness.   Skin:  Negative for rash and wound.   Neurological:  Negative for dizziness, syncope, weakness, light-headedness and headaches.   Hematological:  Negative for adenopathy. Does not bruise/bleed easily.   Psychiatric/Behavioral:  Negative for agitation,  behavioral problems and confusion.          Current Outpatient Medications:     amLODIPine (NORVASC) 10 MG tablet, Take 1 tablet by mouth Daily., Disp: 30 tablet, Rfl: 11    FLUoxetine (PROzac) 40 MG capsule, Take 1 capsule by mouth Daily., Disp: 30 capsule, Rfl: 11    glucose blood (Accu-Chek Nara Plus) test strip, TEST BLOOD SUGAR FOUR TIMES DAILY, Disp: 200 each, Rfl: 11    hydroCHLOROthiazide 50 MG tablet, Take 1 tablet by mouth Daily., Disp: 30 tablet, Rfl: 11    losartan (COZAAR) 100 MG tablet, Take 1 tablet by mouth Daily., Disp: 30 tablet, Rfl: 11    metFORMIN (GLUCOPHAGE) 1000 MG tablet, Take 0.5 tablets by mouth 2 (Two) Times a Day With Meals., Disp: , Rfl:     omeprazole (priLOSEC) 40 MG capsule, Take 1 capsule by mouth Daily., Disp: 30 capsule, Rfl: 11    Semaglutide, 2 MG/DOSE, (Ozempic, 2 MG/DOSE,) 8 MG/3ML solution pen-injector, Inject 2 mg under the skin into the appropriate area as directed 1 (One) Time Per Week., Disp: 3 mL, Rfl: 5    Lab Results   Component Value Date    GLUCOSE 153 (H) 04/24/2024    BUN 12 04/24/2024    CREATININE 0.68 (L) 04/24/2024     04/24/2024    K 4.3 04/24/2024     04/24/2024    CALCIUM 9.5 04/24/2024    PROTEINTOT 7.3 04/24/2024    ALBUMIN 4.3 04/24/2024    ALT 52 (H) 04/24/2024    AST 31 04/24/2024    ALKPHOS 96 04/24/2024    BILITOT 0.4 04/24/2024    GLOB 3.0 04/24/2024    AGRATIO 1.4 04/24/2024    BCR 17.6 04/24/2024    ANIONGAP 11.0 04/24/2024    EGFR 120.5 04/24/2024       WBC   Date Value Ref Range Status   04/24/2024 7.47 3.40 - 10.80 10*3/mm3 Final   02/20/2019 10.3 4.8 - 10.8 K/uL Final     RBC   Date Value Ref Range Status   04/24/2024 5.03 4.14 - 5.80 10*6/mm3 Final   02/20/2019 5.25 4.70 - 6.10 M/uL Final     Hemoglobin   Date Value Ref Range Status   04/24/2024 14.4 13.0 - 17.7 g/dL Final   02/20/2019 15.1 14.0 - 18.0 g/dL Final     Hematocrit   Date Value Ref Range Status   04/24/2024 42.1 37.5 - 51.0 % Final   02/20/2019 45.6 42.0 - 52.0 %  Final     MCV   Date Value Ref Range Status   04/24/2024 83.7 79.0 - 97.0 fL Final   02/20/2019 86.9 80.0 - 94.0 fL Final     MCH   Date Value Ref Range Status   04/24/2024 28.6 26.6 - 33.0 pg Final   02/20/2019 28.8 27.0 - 31.0 pg Final     MCHC   Date Value Ref Range Status   04/24/2024 34.2 31.5 - 35.7 g/dL Final   02/20/2019 33.1 33.0 - 37.0 g/dL Final     RDW   Date Value Ref Range Status   04/24/2024 13.0 12.3 - 15.4 % Final   02/20/2019 12.9 11.5 - 14.5 % Final     RDW-SD   Date Value Ref Range Status   04/24/2024 39.4 37.0 - 54.0 fl Final     MPV   Date Value Ref Range Status   04/24/2024 10.9 6.0 - 12.0 fL Final   02/20/2019 11.2 9.4 - 12.4 fL Final     Platelets   Date Value Ref Range Status   04/24/2024 275 140 - 450 10*3/mm3 Final   02/20/2019 292 130 - 400 K/uL Final     Neutrophil Rel %   Date Value Ref Range Status   02/20/2019 56.4 50.0 - 65.0 % Final     Neutrophil %   Date Value Ref Range Status   08/31/2021 75.6 42.7 - 76.0 % Final     Lymphocyte Rel %   Date Value Ref Range Status   02/20/2019 33.2 20.0 - 40.0 % Final     Lymphocyte %   Date Value Ref Range Status   08/31/2021 16.1 (L) 19.6 - 45.3 % Final     Monocyte Rel %   Date Value Ref Range Status   02/20/2019 7.9 0.0 - 10.0 % Final     Monocyte %   Date Value Ref Range Status   08/31/2021 5.3 5.0 - 12.0 % Final     Eosinophil Rel %   Date Value Ref Range Status   02/20/2019 1.4 0.0 - 5.0 % Final     Eosinophil %   Date Value Ref Range Status   08/31/2021 0.9 0.3 - 6.2 % Final     Basophil Rel %   Date Value Ref Range Status   02/20/2019 0.7 0.0 - 1.0 % Final     Basophil %   Date Value Ref Range Status   08/31/2021 0.5 0.0 - 1.5 % Final     Immature Grans %   Date Value Ref Range Status   08/31/2021 1.6 (H) 0.0 - 0.5 % Final     Neutrophils Absolute   Date Value Ref Range Status   02/20/2019 5.8 1.5 - 7.5 K/uL Final     Neutrophils, Absolute   Date Value Ref Range Status   08/31/2021 10.23 (H) 1.70 - 7.00 10*3/mm3 Final     Lymphocytes  "Absolute   Date Value Ref Range Status   02/20/2019 3.4 1.1 - 4.5 K/uL Final     Lymphocytes, Absolute   Date Value Ref Range Status   08/31/2021 2.18 0.70 - 3.10 10*3/mm3 Final     Monocytes Absolute   Date Value Ref Range Status   02/20/2019 0.80 0.00 - 0.90 K/uL Final     Monocytes, Absolute   Date Value Ref Range Status   08/31/2021 0.71 0.10 - 0.90 10*3/mm3 Final     Eosinophils Absolute   Date Value Ref Range Status   02/20/2019 0.10 0.00 - 0.60 K/uL Final     Eosinophils, Absolute   Date Value Ref Range Status   08/31/2021 0.12 0.00 - 0.40 10*3/mm3 Final     Basophils Absolute   Date Value Ref Range Status   02/20/2019 0.10 0.00 - 0.20 K/uL Final     Basophils, Absolute   Date Value Ref Range Status   08/31/2021 0.07 0.00 - 0.20 10*3/mm3 Final     Immature Grans, Absolute   Date Value Ref Range Status   08/31/2021 0.21 (H) 0.00 - 0.05 10*3/mm3 Final     nRBC   Date Value Ref Range Status   08/31/2021 0.0 0.0 - 0.2 /100 WBC Final         OBJECTIVE:  Visit Vitals  /80 (BP Location: Left arm, Patient Position: Sitting, Cuff Size: Adult)   Pulse 84   Temp 98.1 °F (36.7 °C) (Temporal)   Ht 182.9 cm (72\")   Wt 127 kg (280 lb 9.6 oz)   SpO2 97%   BMI 38.06 kg/m²      Physical Exam  Vitals and nursing note reviewed.   Constitutional:       General: He is not in acute distress.     Appearance: Normal appearance. He is well-developed. He is not ill-appearing or toxic-appearing.      Comments: Pleasant   HENT:      Head: Normocephalic and atraumatic.        Right Ear: Tympanic membrane, ear canal and external ear normal. There is no impacted cerumen.      Left Ear: Tympanic membrane, ear canal and external ear normal. There is no impacted cerumen.   Eyes:      General: No scleral icterus.     Conjunctiva/sclera: Conjunctivae normal.      Pupils: Pupils are equal, round, and reactive to light.   Neck:      Thyroid: No thyromegaly.      Vascular: No carotid bruit or JVD.   Cardiovascular:      Rate and Rhythm: " Normal rate and regular rhythm.      Heart sounds: Normal heart sounds. No murmur heard.     No friction rub. No gallop.   Pulmonary:      Effort: Pulmonary effort is normal. No respiratory distress.      Breath sounds: Normal breath sounds. No stridor. No wheezing, rhonchi or rales.   Abdominal:      General: Abdomen is flat. There is no distension.      Palpations: Abdomen is soft.      Tenderness: There is no abdominal tenderness.   Musculoskeletal:      Right lower leg: No edema.      Left lower leg: No edema.   Skin:     General: Skin is warm and dry.      Coloration: Skin is not jaundiced.   Neurological:      Mental Status: He is alert.      Cranial Nerves: No cranial nerve deficit.   Psychiatric:         Mood and Affect: Mood normal.         Behavior: Behavior normal.         Thought Content: Thought content normal.         Judgment: Judgment normal.       Physical Exam  Respiratory: Clear to auscultation, no wheezing, rales or rhonchi    Results    Assessment/Plan    Diagnoses and all orders for this visit:    1. Encounter for preventive care (Primary)  -     CBC (No Diff); Future  -     Comprehensive Metabolic Panel; Future  -     Hemoglobin A1c; Future  -     Lipid Panel; Future    2. Depression screen    3. Class 2 severe obesity due to excess calories with serious comorbidity and body mass index (BMI) of 38.0 to 38.9 in adult    4. Type 2 diabetes mellitus without complication, without long-term current use of insulin  -     Hemoglobin A1c; Future  -     Microalbumin / Creatinine Urine Ratio - Urine, Clean Catch; Future  -     metFORMIN (GLUCOPHAGE) 1000 MG tablet; Take 0.5 tablets by mouth 2 (Two) Times a Day With Meals.    5. Essential hypertension  -     Comprehensive Metabolic Panel; Future      Assessment & Plan    Annual physical  -Get nonfasting labs today  - Experienced a weight loss of approximately 5 pounds since 10/2024.  - BMI remains elevated at 38.  - Eligible for the pneumonia vaccine  given his history of diabetes.  - Colon cancer screening will commence at age 43.    Suspected temporomandibular joint disorder.  - Reports a burning sensation in the right ear, potentially related to TMJ disorder.  - Physical exam indicates possible TMJ involvement.  - Discussed the use of a mouthpiece to manage teeth grinding at night.  - If the mouthpiece does not alleviate symptoms, Botox injections may be considered.  - As needed ibuprofen to help with symptoms.    Diabetes mellitus.  - Currently taking metformin 500 mg twice a day due to stomach issues.  - Has taken extra Ozempic 0.25 mg to help with appetite control.  - Advised not to exceed 2 mg of Ozempic.  - Labs will be obtained today to monitor his condition; a fingerstick test will be conducted in the office in 6 months.  -- A urine test will be conducted today to ensure there is no proteinuria.    Hypertension.  - Blood pressure is currently well-controlled at 130/80 mmHg.  - On hydrochlorothiazide 50 mg, losartan 100 mg, and amlodipine 10 mg.  - Encouraged to continue current medication regimen and maintain a healthy lifestyle, including regular exercise and a balanced diet.  -Continue current medications at the current doses.  - Further weight loss could potentially reduce the need for some of these medications.      Counseling/Anticipatory Guidance Discussed: nutrition, physical activity, healthy weight, misuse of tobacco, alcohol and drugs, dental health, mental health, immunizations, and screenings    Class 2 Severe Obesity (BMI >=35 and <=39.9). Obesity-related health conditions include the following: hypertension, diabetes mellitus, and dyslipidemias. Obesity is improving with lifestyle modifications. BMI is is above average; BMI management plan is completed. We discussed portion control and increasing exercise.      Return in about 6 months (around 11/2/2025) for Recheck A1c.    Patient was given instructions and counseling regarding his/her  condition or for health maintenance advice. Please see specific information pulled into the AVS if appropriate.     CHARMAINE Sanchez MD  15:27 CDT  5/2/2025  Electronically signed     Patient or patient representative verbalized consent for the use of Ambient Listening during the visit with  HENRIETTA Sanchez MD for chart documentation. 5/2/2025  16:08 CDT   Cyclosporine Counseling:  I discussed with the patient the risks of cyclosporine including but not limited to hypertension, gingival hyperplasia,myelosuppression, immunosuppression, liver damage, kidney damage, neurotoxicity, lymphoma, and serious infections. The patient understands that monitoring is required including baseline blood pressure, CBC, CMP, lipid panel and uric acid, and then 1-2 times monthly CMP and blood pressure.

## 2025-05-03 LAB
ALBUMIN UR-MCNC: <1.2 MG/DL
CREAT UR-MCNC: 160.6 MG/DL
MICROALBUMIN/CREAT UR: NORMAL MG/G{CREAT}

## 2025-05-07 DIAGNOSIS — K21.9 GASTROESOPHAGEAL REFLUX DISEASE, UNSPECIFIED WHETHER ESOPHAGITIS PRESENT: ICD-10-CM

## 2025-05-08 RX ORDER — OMEPRAZOLE 40 MG/1
40 CAPSULE, DELAYED RELEASE ORAL DAILY
Qty: 30 CAPSULE | Refills: 11 | Status: SHIPPED | OUTPATIENT
Start: 2025-05-08

## 2025-05-08 NOTE — TELEPHONE ENCOUNTER
Rx Refill Note  Requested Prescriptions     Pending Prescriptions Disp Refills    omeprazole (priLOSEC) 40 MG capsule [Pharmacy Med Name: OMEPRAZOLE DR 40 MG CAPSULE] 30 capsule 0     Sig: TAKE 1 CAPSULE BY MOUTH ONCE DAILY      Last office visit with prescribing clinician: 5/2/2025   Last telemedicine visit with prescribing clinician: Visit date not found   Next office visit with prescribing clinician: 11/10/2025                         Would you like a call back once the refill request has been completed: [] Yes [] No    If the office needs to give you a call back, can they leave a voicemail: [] Yes [] No    ANAYA العراقي  05/08/25, 07:59 CDT    Medication last filled 02/16/2024, qty 30, 11 refills.

## (undated) DEVICE — ENDOPATH XCEL WITH OPTIVIEW TECHNOLOGY UNIVERSAL TROCAR STABILITY SLEEVES: Brand: ENDOPATH XCEL OPTIVIEW

## (undated) DEVICE — ANTIBACTERIAL UNDYED BRAIDED (POLYGLACTIN 910), SYNTHETIC ABSORBABLE SUTURE: Brand: COATED VICRYL

## (undated) DEVICE — PK TURNOVER RM ADV

## (undated) DEVICE — MONOPOLAR METZENBAUM SCISSOR, MINI BLADE TIP, DISPOSABLE: Brand: MONOPOLAR METZENBAUM SCISSOR, MINI BLADE TIP, DISPOSABLE

## (undated) DEVICE — ENDOPATH PNEUMONEEDLE INSUFFLATION NEEDLES WITH LUER LOCK CONNECTORS 120MM: Brand: ENDOPATH

## (undated) DEVICE — GLV SURG SENSICARE W/ALOE PF LF SZ6 STRL

## (undated) DEVICE — NDL HYPO PRECISIONGLIDE REG 21G 1 1/2

## (undated) DEVICE — APPL CHLORAPREP HI/LITE 26ML ORNG

## (undated) DEVICE — ELECTRD BLD EZ CLN MOD XLNG 2.75IN

## (undated) DEVICE — ENDOPOUCH RETRIEVER SPECIMEN RETRIEVAL BAGS: Brand: ENDOPOUCH RETRIEVER

## (undated) DEVICE — TOWEL,OR,DSP,ST,BLUE,STD,4/PK,20PK/CS: Brand: MEDLINE

## (undated) DEVICE — PAD LAP CHOLE: Brand: MEDLINE INDUSTRIES, INC.

## (undated) DEVICE — SYR LUERLOK 30CC

## (undated) DEVICE — 2, DISPOSABLE SUCTION/IRRIGATOR WITHOUT DISPOSABLE TIP: Brand: STRYKEFLOW

## (undated) DEVICE — ELECTRD L HK EZ CLN 33CM

## (undated) DEVICE — CVR UNIV C/ARM

## (undated) DEVICE — GLV SURG SENSICARE W/ALOE PF LF 6.5 STRL